# Patient Record
Sex: FEMALE | Race: WHITE | NOT HISPANIC OR LATINO | Employment: FULL TIME | URBAN - METROPOLITAN AREA
[De-identification: names, ages, dates, MRNs, and addresses within clinical notes are randomized per-mention and may not be internally consistent; named-entity substitution may affect disease eponyms.]

---

## 2023-08-21 ENCOUNTER — HOSPITAL ENCOUNTER (OUTPATIENT)
Dept: DATA CONVERSION | Facility: HOSPITAL | Age: 78
End: 2023-08-21

## 2023-08-21 DIAGNOSIS — M79.89 OTHER SPECIFIED SOFT TISSUE DISORDERS: ICD-10-CM

## 2023-08-21 PROBLEM — R39.9 LOWER URINARY TRACT SYMPTOMS (LUTS): Status: ACTIVE | Noted: 2023-08-21

## 2023-08-21 PROBLEM — K21.9 GASTROESOPHAGEAL REFLUX DISEASE WITHOUT ESOPHAGITIS: Status: ACTIVE | Noted: 2023-08-21

## 2023-08-21 PROBLEM — E55.9 VITAMIN D DEFICIENCY: Status: ACTIVE | Noted: 2023-08-21

## 2023-08-21 PROBLEM — I85.00 ESOPHAGEAL VARICES (MULTI): Status: ACTIVE | Noted: 2023-08-21

## 2023-08-21 PROBLEM — E66.9 OBESITY: Status: ACTIVE | Noted: 2023-08-21

## 2023-08-21 PROBLEM — R73.03 PREDIABETES: Status: ACTIVE | Noted: 2023-08-21

## 2023-08-21 PROBLEM — S82.851A CLOSED TRIMALLEOLAR FRACTURE OF RIGHT ANKLE: Status: ACTIVE | Noted: 2023-08-21

## 2023-08-21 PROBLEM — G47.00 INSOMNIA: Status: ACTIVE | Noted: 2023-08-21

## 2023-08-21 PROBLEM — M15.0 PRIMARY OSTEOARTHRITIS INVOLVING MULTIPLE JOINTS: Status: ACTIVE | Noted: 2023-08-21

## 2023-08-21 PROBLEM — E04.1 THYROID NODULE: Status: ACTIVE | Noted: 2023-08-21

## 2023-08-21 PROBLEM — R92.8 ABNORMAL FINDING ON MAMMOGRAPHY: Status: ACTIVE | Noted: 2023-08-21

## 2023-08-21 PROBLEM — F41.9 ANXIETY: Status: ACTIVE | Noted: 2023-08-21

## 2023-08-21 PROBLEM — R30.0 DYSURIA: Status: ACTIVE | Noted: 2023-08-21

## 2023-08-21 PROBLEM — J31.0 CHRONIC RHINITIS: Status: ACTIVE | Noted: 2023-08-21

## 2023-08-21 PROBLEM — Z98.890 STATUS POST EXCISIONAL BIOPSY: Status: ACTIVE | Noted: 2023-08-21

## 2023-08-21 PROBLEM — I10 ESSENTIAL HYPERTENSION: Status: ACTIVE | Noted: 2023-08-21

## 2023-08-21 PROBLEM — K76.0 FATTY LIVER: Status: ACTIVE | Noted: 2023-08-21

## 2023-08-21 PROBLEM — M15.9 PRIMARY OSTEOARTHRITIS INVOLVING MULTIPLE JOINTS: Status: ACTIVE | Noted: 2023-08-21

## 2023-08-21 PROBLEM — E78.2 MIXED HYPERLIPIDEMIA: Status: ACTIVE | Noted: 2023-08-21

## 2023-08-21 PROBLEM — S82.853A: Status: ACTIVE | Noted: 2023-08-21

## 2023-08-21 PROBLEM — R82.998 URINE LEUKOCYTES: Status: ACTIVE | Noted: 2023-08-21

## 2023-08-21 PROBLEM — B02.29 POSTHERPETIC NEURALGIA: Status: ACTIVE | Noted: 2023-08-21

## 2023-08-21 PROBLEM — N95.1 MENOPAUSAL SYMPTOM: Status: ACTIVE | Noted: 2023-08-21

## 2023-08-21 PROBLEM — S93.431A SYNDESMOTIC DISRUPTION OF RIGHT ANKLE: Status: ACTIVE | Noted: 2023-08-21

## 2023-08-21 PROBLEM — R60.0 EDEMA OF LOWER EXTREMITY: Status: ACTIVE | Noted: 2023-08-21

## 2023-08-21 PROBLEM — R31.9 HEMATURIA: Status: ACTIVE | Noted: 2023-08-21

## 2023-08-21 PROBLEM — N60.99 ATYPICAL LOBULAR HYPERPLASIA OF BREAST: Status: ACTIVE | Noted: 2023-08-21

## 2023-08-21 PROBLEM — K75.81 STEATOHEPATITIS: Status: ACTIVE | Noted: 2023-08-21

## 2023-08-21 PROBLEM — B35.1 TOENAIL FUNGUS: Status: ACTIVE | Noted: 2023-08-21

## 2023-08-21 PROBLEM — H69.93 DYSFUNCTION OF BOTH EUSTACHIAN TUBES: Status: ACTIVE | Noted: 2023-08-21

## 2023-08-21 PROBLEM — J01.90 ACUTE SUPPURATIVE SINUSITIS: Status: ACTIVE | Noted: 2023-08-21

## 2023-08-21 RX ORDER — ZOLPIDEM TARTRATE 10 MG/1
1 TABLET ORAL NIGHTLY PRN
COMMUNITY
Start: 2013-02-11 | End: 2023-10-02 | Stop reason: SDUPTHER

## 2023-08-21 RX ORDER — POTASSIUM CHLORIDE 20 MEQ/1
1 TABLET, EXTENDED RELEASE ORAL DAILY
COMMUNITY
Start: 2013-01-15 | End: 2024-04-05 | Stop reason: SDUPTHER

## 2023-08-21 RX ORDER — TRIAMCINOLONE ACETONIDE 1 MG/G
CREAM TOPICAL
COMMUNITY
Start: 2021-06-09

## 2023-08-21 RX ORDER — ALBUTEROL SULFATE 90 UG/1
2 AEROSOL, METERED RESPIRATORY (INHALATION) 4 TIMES DAILY PRN
COMMUNITY

## 2023-08-21 RX ORDER — MULTIVITAMIN
TABLET ORAL
COMMUNITY

## 2023-08-21 RX ORDER — GABAPENTIN 100 MG/1
1 CAPSULE ORAL 3 TIMES DAILY
COMMUNITY
Start: 2016-10-19 | End: 2023-10-06 | Stop reason: SDUPTHER

## 2023-08-21 RX ORDER — ALBUTEROL SULFATE 0.63 MG/3ML
SOLUTION RESPIRATORY (INHALATION)
COMMUNITY
Start: 2021-05-07

## 2023-08-21 RX ORDER — ALPRAZOLAM 0.5 MG/1
1 TABLET ORAL 2 TIMES DAILY PRN
COMMUNITY
Start: 2022-02-25 | End: 2023-10-06 | Stop reason: SDUPTHER

## 2023-08-21 RX ORDER — LOSARTAN POTASSIUM 100 MG/1
TABLET ORAL
COMMUNITY
Start: 2021-09-28 | End: 2023-10-06 | Stop reason: SDUPTHER

## 2023-08-21 RX ORDER — ERGOCALCIFEROL 1.25 MG/1
1 CAPSULE ORAL
COMMUNITY
Start: 2013-02-03 | End: 2023-10-06 | Stop reason: SDUPTHER

## 2023-08-21 RX ORDER — DOXYCYCLINE 100 MG/1
1 TABLET ORAL AS NEEDED
COMMUNITY
Start: 2023-05-15

## 2023-08-21 RX ORDER — HYDROCHLOROTHIAZIDE 25 MG/1
1 TABLET ORAL DAILY
COMMUNITY
Start: 2013-01-13 | End: 2023-10-06 | Stop reason: SDUPTHER

## 2023-08-21 RX ORDER — MELOXICAM 15 MG/1
1 TABLET ORAL DAILY
COMMUNITY
Start: 2022-06-24 | End: 2023-10-06 | Stop reason: SDUPTHER

## 2023-08-21 RX ORDER — LIDOCAINE HYDROCHLORIDE 20 MG/ML
SOLUTION OROPHARYNGEAL
COMMUNITY
Start: 2021-04-23

## 2023-08-21 RX ORDER — IPRATROPIUM BROMIDE AND ALBUTEROL SULFATE 2.5; .5 MG/3ML; MG/3ML
3 SOLUTION RESPIRATORY (INHALATION) EVERY 6 HOURS PRN
COMMUNITY
End: 2023-10-06 | Stop reason: SDUPTHER

## 2023-08-21 RX ORDER — ALPRAZOLAM 2 MG/1
TABLET ORAL AS NEEDED
COMMUNITY
End: 2023-10-06

## 2023-10-02 DIAGNOSIS — F41.9 ANXIETY: ICD-10-CM

## 2023-10-02 DIAGNOSIS — E78.2 MIXED HYPERLIPIDEMIA: ICD-10-CM

## 2023-10-02 DIAGNOSIS — Z01.89 ENCOUNTER FOR ROUTINE LABORATORY TESTING: Primary | ICD-10-CM

## 2023-10-02 DIAGNOSIS — R73.9 HYPERGLYCEMIA: ICD-10-CM

## 2023-10-02 DIAGNOSIS — I10 ESSENTIAL HYPERTENSION: ICD-10-CM

## 2023-10-02 DIAGNOSIS — E55.9 VITAMIN D DEFICIENCY: ICD-10-CM

## 2023-10-02 RX ORDER — ZOLPIDEM TARTRATE 10 MG/1
10 TABLET ORAL NIGHTLY PRN
Qty: 30 TABLET | Refills: 0 | Status: SHIPPED | OUTPATIENT
Start: 2023-10-02 | End: 2023-10-06 | Stop reason: SDUPTHER

## 2023-10-02 NOTE — TELEPHONE ENCOUNTER
-Pt requesting Rx for zolpidem (Ambien) 10 mg tablet to [facility] on [address] in [place].  -Pt also requesting order for labs before appt on Friday.

## 2023-10-03 ENCOUNTER — LAB (OUTPATIENT)
Dept: LAB | Facility: LAB | Age: 78
End: 2023-10-03
Payer: COMMERCIAL

## 2023-10-03 DIAGNOSIS — E78.2 MIXED HYPERLIPIDEMIA: ICD-10-CM

## 2023-10-03 DIAGNOSIS — Z01.89 ENCOUNTER FOR ROUTINE LABORATORY TESTING: ICD-10-CM

## 2023-10-03 DIAGNOSIS — E55.9 VITAMIN D DEFICIENCY: ICD-10-CM

## 2023-10-03 DIAGNOSIS — I10 ESSENTIAL HYPERTENSION: ICD-10-CM

## 2023-10-03 DIAGNOSIS — R73.9 HYPERGLYCEMIA: ICD-10-CM

## 2023-10-03 PROCEDURE — 36415 COLL VENOUS BLD VENIPUNCTURE: CPT

## 2023-10-04 DIAGNOSIS — R73.03 PREDIABETES: Primary | ICD-10-CM

## 2023-10-04 LAB
25(OH)D3 SERPL-MCNC: 61 NG/ML (ref 30–100)
ALBUMIN SERPL BCP-MCNC: 4.3 G/DL (ref 3.4–5)
ALP SERPL-CCNC: 74 U/L (ref 33–136)
ALT SERPL W P-5'-P-CCNC: 25 U/L (ref 7–45)
ANION GAP SERPL CALC-SCNC: 15 MMOL/L (ref 10–20)
AST SERPL W P-5'-P-CCNC: 16 U/L (ref 9–39)
BASOPHILS # BLD AUTO: 0.08 X10*3/UL (ref 0–0.1)
BASOPHILS NFR BLD AUTO: 0.6 %
BILIRUB DIRECT SERPL-MCNC: 0.1 MG/DL (ref 0–0.3)
BILIRUB SERPL-MCNC: 0.7 MG/DL (ref 0–1.2)
BUN SERPL-MCNC: 13 MG/DL (ref 6–23)
CALCIUM SERPL-MCNC: 9.4 MG/DL (ref 8.6–10.6)
CHLORIDE SERPL-SCNC: 101 MMOL/L (ref 98–107)
CHOLEST SERPL-MCNC: 187 MG/DL (ref 0–199)
CHOLESTEROL/HDL RATIO: 4.5
CO2 SERPL-SCNC: 29 MMOL/L (ref 21–32)
CREAT SERPL-MCNC: 0.59 MG/DL (ref 0.5–1.05)
EOSINOPHIL # BLD AUTO: 0.27 X10*3/UL (ref 0–0.4)
EOSINOPHIL NFR BLD AUTO: 2.1 %
ERYTHROCYTE [DISTWIDTH] IN BLOOD BY AUTOMATED COUNT: 13.9 % (ref 11.5–14.5)
EST. AVERAGE GLUCOSE BLD GHB EST-MCNC: 134 MG/DL
GFR SERPL CREATININE-BSD FRML MDRD: >90 ML/MIN/1.73M*2
GLUCOSE SERPL-MCNC: 102 MG/DL (ref 74–99)
HBA1C MFR BLD: 6.3 %
HCT VFR BLD AUTO: 50.1 % (ref 36–46)
HDLC SERPL-MCNC: 41.9 MG/DL
HGB BLD-MCNC: 16.6 G/DL (ref 12–16)
IMM GRANULOCYTES # BLD AUTO: 0.08 X10*3/UL (ref 0–0.5)
IMM GRANULOCYTES NFR BLD AUTO: 0.6 % (ref 0–0.9)
LDLC SERPL CALC-MCNC: 105 MG/DL (ref 140–190)
LYMPHOCYTES # BLD AUTO: 4 X10*3/UL (ref 0.8–3)
LYMPHOCYTES NFR BLD AUTO: 31.4 %
MCH RBC QN AUTO: 30.5 PG (ref 26–34)
MCHC RBC AUTO-ENTMCNC: 33.1 G/DL (ref 32–36)
MCV RBC AUTO: 92 FL (ref 80–100)
MONOCYTES # BLD AUTO: 0.8 X10*3/UL (ref 0.05–0.8)
MONOCYTES NFR BLD AUTO: 6.3 %
NEUTROPHILS # BLD AUTO: 7.52 X10*3/UL (ref 1.6–5.5)
NEUTROPHILS NFR BLD AUTO: 59 %
NON HDL CHOLESTEROL: 145 MG/DL (ref 0–149)
NRBC BLD-RTO: 0 /100 WBCS (ref 0–0)
PLATELET # BLD AUTO: 255 X10*3/UL (ref 150–450)
PMV BLD AUTO: 11.3 FL (ref 7.5–11.5)
POTASSIUM SERPL-SCNC: 3.7 MMOL/L (ref 3.5–5.3)
PROT SERPL-MCNC: 7.2 G/DL (ref 6.4–8.2)
RBC # BLD AUTO: 5.45 X10*6/UL (ref 4–5.2)
SODIUM SERPL-SCNC: 141 MMOL/L (ref 136–145)
TRIGL SERPL-MCNC: 201 MG/DL (ref 0–149)
VLDL: 40 MG/DL (ref 0–40)
WBC # BLD AUTO: 12.8 X10*3/UL (ref 4.4–11.3)

## 2023-10-04 RX ORDER — METFORMIN HYDROCHLORIDE 500 MG/1
500 TABLET, EXTENDED RELEASE ORAL
Qty: 90 TABLET | Refills: 3 | Status: SHIPPED | OUTPATIENT
Start: 2023-10-04 | End: 2023-10-06

## 2023-10-06 ENCOUNTER — OFFICE VISIT (OUTPATIENT)
Dept: PRIMARY CARE | Facility: CLINIC | Age: 78
End: 2023-10-06
Payer: COMMERCIAL

## 2023-10-06 VITALS
TEMPERATURE: 97.6 F | HEART RATE: 83 BPM | WEIGHT: 202 LBS | OXYGEN SATURATION: 94 % | BODY MASS INDEX: 38.29 KG/M2 | DIASTOLIC BLOOD PRESSURE: 80 MMHG | SYSTOLIC BLOOD PRESSURE: 138 MMHG

## 2023-10-06 DIAGNOSIS — Z23 ENCOUNTER FOR IMMUNIZATION: ICD-10-CM

## 2023-10-06 DIAGNOSIS — E78.2 MIXED HYPERLIPIDEMIA: ICD-10-CM

## 2023-10-06 DIAGNOSIS — Z71.89 COUNSELING REGARDING ADVANCED CARE PLANNING AND GOALS OF CARE: ICD-10-CM

## 2023-10-06 DIAGNOSIS — E55.9 VITAMIN D DEFICIENCY: ICD-10-CM

## 2023-10-06 DIAGNOSIS — M15.9 PRIMARY OSTEOARTHRITIS INVOLVING MULTIPLE JOINTS: ICD-10-CM

## 2023-10-06 DIAGNOSIS — Z12.12 ENCOUNTER FOR COLORECTAL CANCER SCREENING: ICD-10-CM

## 2023-10-06 DIAGNOSIS — R06.00 DYSPNEA, UNSPECIFIED TYPE: ICD-10-CM

## 2023-10-06 DIAGNOSIS — Z00.00 ANNUAL PHYSICAL EXAM: Primary | ICD-10-CM

## 2023-10-06 DIAGNOSIS — E66.01 CLASS 2 SEVERE OBESITY WITH SERIOUS COMORBIDITY AND BODY MASS INDEX (BMI) OF 38.0 TO 38.9 IN ADULT, UNSPECIFIED OBESITY TYPE (MULTI): ICD-10-CM

## 2023-10-06 DIAGNOSIS — Z12.11 ENCOUNTER FOR COLORECTAL CANCER SCREENING: ICD-10-CM

## 2023-10-06 DIAGNOSIS — R73.03 PREDIABETES: ICD-10-CM

## 2023-10-06 DIAGNOSIS — Z01.89 ENCOUNTER FOR ROUTINE LABORATORY TESTING: ICD-10-CM

## 2023-10-06 DIAGNOSIS — Z12.31 ENCOUNTER FOR SCREENING MAMMOGRAM FOR BREAST CANCER: ICD-10-CM

## 2023-10-06 DIAGNOSIS — K21.9 GASTROESOPHAGEAL REFLUX DISEASE WITHOUT ESOPHAGITIS: ICD-10-CM

## 2023-10-06 DIAGNOSIS — Z13.820 ENCOUNTER FOR SCREENING FOR OSTEOPOROSIS: ICD-10-CM

## 2023-10-06 DIAGNOSIS — F41.9 ANXIETY: ICD-10-CM

## 2023-10-06 DIAGNOSIS — I10 ESSENTIAL HYPERTENSION: ICD-10-CM

## 2023-10-06 DIAGNOSIS — M15.9 POLYOSTEOARTHRITIS, UNSPECIFIED: ICD-10-CM

## 2023-10-06 PROCEDURE — 1126F AMNT PAIN NOTED NONE PRSNT: CPT | Performed by: STUDENT IN AN ORGANIZED HEALTH CARE EDUCATION/TRAINING PROGRAM

## 2023-10-06 PROCEDURE — 3079F DIAST BP 80-89 MM HG: CPT | Performed by: STUDENT IN AN ORGANIZED HEALTH CARE EDUCATION/TRAINING PROGRAM

## 2023-10-06 PROCEDURE — 1160F RVW MEDS BY RX/DR IN RCRD: CPT | Performed by: STUDENT IN AN ORGANIZED HEALTH CARE EDUCATION/TRAINING PROGRAM

## 2023-10-06 PROCEDURE — G0439 PPPS, SUBSEQ VISIT: HCPCS | Performed by: STUDENT IN AN ORGANIZED HEALTH CARE EDUCATION/TRAINING PROGRAM

## 2023-10-06 PROCEDURE — 1036F TOBACCO NON-USER: CPT | Performed by: STUDENT IN AN ORGANIZED HEALTH CARE EDUCATION/TRAINING PROGRAM

## 2023-10-06 PROCEDURE — 3075F SYST BP GE 130 - 139MM HG: CPT | Performed by: STUDENT IN AN ORGANIZED HEALTH CARE EDUCATION/TRAINING PROGRAM

## 2023-10-06 PROCEDURE — 1159F MED LIST DOCD IN RCRD: CPT | Performed by: STUDENT IN AN ORGANIZED HEALTH CARE EDUCATION/TRAINING PROGRAM

## 2023-10-06 RX ORDER — MELOXICAM 15 MG/1
15 TABLET ORAL DAILY
Qty: 90 TABLET | OUTPATIENT
Start: 2023-10-06

## 2023-10-06 RX ORDER — IPRATROPIUM BROMIDE AND ALBUTEROL SULFATE 2.5; .5 MG/3ML; MG/3ML
3 SOLUTION RESPIRATORY (INHALATION) EVERY 6 HOURS PRN
Qty: 180 ML
Start: 2023-10-06

## 2023-10-06 RX ORDER — GABAPENTIN 100 MG/1
100 CAPSULE ORAL 3 TIMES DAILY
Start: 2023-10-06 | End: 2023-10-11

## 2023-10-06 RX ORDER — TIZANIDINE 4 MG/1
4 TABLET ORAL EVERY 8 HOURS PRN
Qty: 60 TABLET | Refills: 1 | Status: SHIPPED | OUTPATIENT
Start: 2023-10-06

## 2023-10-06 RX ORDER — ALPRAZOLAM 0.5 MG/1
0.5 TABLET ORAL 2 TIMES DAILY PRN
Qty: 7 TABLET
Start: 2023-10-06 | End: 2024-04-05 | Stop reason: ALTCHOICE

## 2023-10-06 RX ORDER — MELOXICAM 15 MG/1
15 TABLET ORAL DAILY
Start: 2023-10-06

## 2023-10-06 RX ORDER — ZOLPIDEM TARTRATE 10 MG/1
10 TABLET ORAL NIGHTLY PRN
Qty: 30 TABLET | Refills: 0
Start: 2023-10-06 | End: 2023-10-11

## 2023-10-06 RX ORDER — ERGOCALCIFEROL 1.25 MG/1
1 CAPSULE ORAL
Start: 2023-10-06 | End: 2023-10-18

## 2023-10-06 RX ORDER — LOSARTAN POTASSIUM 100 MG/1
100 TABLET ORAL DAILY
Start: 2023-10-06 | End: 2024-02-20 | Stop reason: ALTCHOICE

## 2023-10-06 RX ORDER — HYDROCHLOROTHIAZIDE 25 MG/1
25 TABLET ORAL DAILY
Start: 2023-10-06 | End: 2024-02-08

## 2023-10-06 ASSESSMENT — ENCOUNTER SYMPTOMS
ALLERGIC/IMMUNOLOGIC NEGATIVE: 1
MUSCULOSKELETAL NEGATIVE: 1
GASTROINTESTINAL NEGATIVE: 1
HEMATOLOGIC/LYMPHATIC NEGATIVE: 1
NEUROLOGICAL NEGATIVE: 1
ENDOCRINE NEGATIVE: 1
PSYCHIATRIC NEGATIVE: 1
RESPIRATORY NEGATIVE: 1
EYES NEGATIVE: 1
CARDIOVASCULAR NEGATIVE: 1
CONSTITUTIONAL NEGATIVE: 1

## 2023-10-06 ASSESSMENT — PAIN SCALES - GENERAL: PAINLEVEL: 0-NO PAIN

## 2023-10-06 NOTE — PATIENT INSTRUCTIONS
Diagnoses and all orders for this visit:  Annual physical exam  Encounter for routine laboratory testing  Comments:  Patient had labwork done for this appointment.  Will order labwork for the patient's next appointment.  Encounter for colorectal cancer screening  Encounter for screening mammogram for breast cancer  Encounter for screening for osteoporosis  Encounter for immunization  Counseling regarding advanced care planning and goals of care  Comments:  - Encouraged the patient to confirm that all documents are up to date.  Essential hypertension  Comments:  - Looks great, will not make changes at this time.  Orders:  -     hydroCHLOROthiazide (HYDRODiuril) 25 mg tablet; Take 1 tablet (25 mg) by mouth once daily.  -     losartan (Cozaar) 100 mg tablet; Take 1 tablet (100 mg) by mouth once daily.  Prediabetes  Comments:  - Discussed starting metformin in addition to dietary and lifestyle modification. Patient not interested at this time.  Mixed hyperlipidemia  Comments:  - Encouraged weight loss to assist with hypertriglyceridemia.  - Will not make medication changes at this time.  Class 2 severe obesity with serious comorbidity and body mass index (BMI) of 38.0 to 38.9 in adult, unspecified obesity type (CMS/[facility])  Comments:  - Encouraged the patient to see if GLP1s (wegovy, mounjaro, ozempic, trulicity) are covered by insurance.  - Would happily prescribe if they are.  Gastroesophageal reflux disease without esophagitis  Primary osteoarthritis involving multiple joints  -     gabapentin (Neurontin) 100 mg capsule; Take 1 capsule (100 mg) by mouth 3 times a day.  -     meloxicam (Mobic) 15 mg tablet; Take 1 tablet (15 mg) by mouth once daily.  -     tiZANidine (Zanaflex) 4 mg tablet; Take 1 tablet (4 mg) by mouth every 8 hours if needed for muscle spasms.  Dyspnea, unspecified type  -     ipratropium-albuteroL (Duo-Neb) 0.5-2.5 mg/3 mL nebulizer solution; Take 3 mL by nebulization every 6 hours if needed for  wheezing or shortness of breath.  Vitamin D deficiency  -     ergocalciferol (Vitamin D-2) 1.25 MG (76202 UT) capsule; Take 1 capsule (1,250 mcg) by mouth 1 (one) time per week.  Anxiety  -     ALPRAZolam (Xanax) 0.5 mg tablet; Take 1 tablet (0.5 mg) by mouth 2 times a day as needed for anxiety.  -     zolpidem (Ambien) 10 mg tablet; Take 1 tablet (10 mg) by mouth as needed at bedtime for sleep.

## 2023-10-06 NOTE — PROGRESS NOTES
[facility]: [facility]  MEDICARE WELLNESS EXAM      Jaclyn Mills is a 78 y.o. female that is presenting today for Annual Exam (CPE).    Assessment/Plan    Diagnoses and all orders for this visit:  Annual physical exam  Encounter for routine laboratory testing  Encounter for colorectal cancer screening  Encounter for screening mammogram for breast cancer  Encounter for screening for osteoporosis  Encounter for immunization  Counseling regarding advanced care planning and goals of care  Comments:  - Encouraged the patient to confirm that all documents are up to date.  Essential hypertension  Comments:  - Looks great, will not make changes at this time.  Prediabetes  Mixed hyperlipidemia  Class 2 severe obesity with serious comorbidity and body mass index (BMI) of 38.0 to 38.9 in adult, unspecified obesity type (CMS/[facility])  Comments:  - Encouraged the patient to see if GLP1s (wegovy, mounjaro, ozempic, trulicity) are covered by insurance.  - Would happily prescribe if they are.  Gastroesophageal reflux disease without esophagitis  Primary osteoarthritis involving multiple joints  Vitamin D deficiency  Anxiety    Advance Care Planning   Advanced Care Planning was discussed with patient:  The patient has an active advanced care plan on file The patient has an active surrogate decision-maker on file  The patient was encouraged to ensure that any/all documentation is accurate and up to date, and that our office be provided a copy in the event that anything changes.    ACTIVITIES OF DAILY LIVING:  Basic ADLs:  Bathing: Independent, Dressing: Independent, Toileting: Independent, Transferring: Independent, Continence: Independent, Feeding: Independent.    Instrumental ADLs:  Ability to use phone: Independent, Shopping: Independent, Cooking: Independent, House-keeping: Independent, Laundry: Independent, Transportation: Independent, Medication Management: Independent, Finance Management:  Independent.    Subjective   - The patient feels well and denies any acute symptoms or concerns at this time.   - The patient denies any changes or progression of their chronic medical problems.  - The patient denies any problems or concerns with their medications.      Review of Systems   Constitutional: Negative.    HENT: Negative.     Eyes: Negative.    Respiratory: Negative.     Cardiovascular: Negative.    Gastrointestinal: Negative.    Endocrine: Negative.    Genitourinary: Negative.    Musculoskeletal: Negative.    Skin: Negative.    Allergic/Immunologic: Negative.    Neurological: Negative.    Hematological: Negative.    Psychiatric/Behavioral: Negative.     All other systems reviewed and are negative.    Objective   Vitals:    10/06/23 1505   BP: 138/80   Pulse: 83   Temp: 36.4 °C (97.6 °F)   SpO2: 94%      Body mass index is 38.29 kg/m².  Physical Exam  Vitals and nursing note reviewed.   Constitutional:       General: She is not in acute distress.     Appearance: Normal appearance. She is not ill-appearing.   HENT:      Head: Normocephalic and atraumatic.      Right Ear: Tympanic membrane, ear canal and external ear normal. There is no impacted cerumen.      Left Ear: Tympanic membrane, ear canal and external ear normal. There is no impacted cerumen.      Nose: Nose normal.      Mouth/Throat:      Mouth: Mucous membranes are moist.      Pharynx: Oropharynx is clear. No oropharyngeal exudate or posterior oropharyngeal erythema.   Eyes:      General: No scleral icterus.        Right eye: No discharge.         Left eye: No discharge.      Extraocular Movements: Extraocular movements intact.      Conjunctiva/sclera: Conjunctivae normal.      Pupils: Pupils are equal, round, and reactive to light.   Neck:      Vascular: No carotid bruit.   Cardiovascular:      Rate and Rhythm: Normal rate and regular rhythm.      Pulses: Normal pulses.      Heart sounds: Normal heart sounds. No murmur heard.     No friction  "rub. No gallop.   Pulmonary:      Effort: Pulmonary effort is normal. No respiratory distress.      Breath sounds: Normal breath sounds.   Abdominal:      General: Abdomen is flat. Bowel sounds are normal. There is no distension.      Palpations: Abdomen is soft.      Tenderness: There is no abdominal tenderness.      Hernia: No hernia is present.   Musculoskeletal:         General: No swelling or tenderness. Normal range of motion.   Lymphadenopathy:      Cervical: No cervical adenopathy.   Skin:     General: Skin is warm and dry.      Capillary Refill: Capillary refill takes less than 2 seconds.      Coloration: Skin is not jaundiced.      Findings: No rash.   Neurological:      General: No focal deficit present.      Mental Status: She is alert and oriented to person, place, and time. Mental status is at baseline.   Psychiatric:         Mood and Affect: Mood normal.         Behavior: Behavior normal.       Diagnostic Results   Lab Results   Component Value Date    GLUCOSE 102 (H) 10/03/2023    CALCIUM 9.4 10/03/2023     10/03/2023    K 3.7 10/03/2023    CO2 29 10/03/2023     10/03/2023    BUN 13 10/03/2023    CREATININE 0.59 10/03/2023     Lab Results   Component Value Date    ALT 25 10/03/2023    AST 16 10/03/2023    ALKPHOS 74 10/03/2023    BILITOT 0.7 10/03/2023     Lab Results   Component Value Date    WBC 12.8 (H) 10/03/2023    HGB 16.6 (H) 10/03/2023    HCT 50.1 (H) 10/03/2023    MCV 92 10/03/2023     10/03/2023     Lab Results   Component Value Date    CHOL 187 10/03/2023    CHOL 187 07/26/2022    CHOL 178 01/06/2022     Lab Results   Component Value Date    HDL 41.9 10/03/2023    HDL 40.7 07/26/2022    HDL 40.0 01/06/2022     Lab Results   Component Value Date    LDLCALC 105 (L) 10/03/2023     Lab Results   Component Value Date    TRIG 201 (H) 10/03/2023    TRIG 192 (H) 07/26/2022    TRIG 126 01/06/2022     No components found for: \"CHOLHDL\"  Lab Results   Component Value Date    HGBA1C " 6.3 (H) 10/03/2023     Other labs not included in the list above reviewed either before or during this encounter.    History   Past Medical History:   Diagnosis Date    Other abnormal and inconclusive findings on diagnostic imaging of breast 2020    Abnormal mammogram of left breast    Other specified disorders of breast 2020    Seroma of breast    Personal history of other diseases of the circulatory system 2020    History of hypertension    Personal history of other diseases of the respiratory system 2019    History of acute bacterial sinusitis    Squamous cell carcinoma of skin of right lower limb, including hip 2020    Squamous cell carcinoma of skin of right lower extremity     Past Surgical History:   Procedure Laterality Date    HERNIA REPAIR  2020    Hernia Repair    OTHER SURGICAL HISTORY  10/19/2020    Biopsy Skin    ROTATOR CUFF REPAIR  2020    Rotator Cuff Repair    TOTAL ABDOMINAL HYSTERECTOMY  2020    Total Abdominal Hysterectomy    UMBILICAL HERNIA REPAIR  2020    Umbilical Hernia Repair     Family History   Problem Relation Name Age of Onset    Stroke Mother      Diabetes Mother      Alzheimer's disease Father      Lung cancer Sister      No Known Problems Sister      Asthma Brother      Cancer Brother      No Known Problems Brother      Breast cancer Daughter      Cancer Daughter      No Known Problems Daughter       Social History     Socioeconomic History    Marital status:      Spouse name: Not on file    Number of children: Not on file    Years of education: Not on file    Highest education level: Not on file   Occupational History    Not on file   Tobacco Use    Smoking status: Former     Types: Cigarettes     Quit date:      Years since quittin.7    Smokeless tobacco: Never   Substance and Sexual Activity    Alcohol use: Yes     Comment: rarely    Drug use: Never    Sexual activity: Not on file   Other Topics Concern    Not  on file   Social History Narrative    Not on file     Social Determinants of Health     Financial Resource Strain: Not on file   Food Insecurity: Not on file   Transportation Needs: Not on file   Physical Activity: Not on file   Stress: Not on file   Social Connections: Not on file   Intimate Partner Violence: Not on file   Housing Stability: Not on file     Allergies   Allergen Reactions    Amoxicillin-Pot Clavulanate Itching     vomiting    Sulfa (Sulfonamide Antibiotics) Unknown and Other    Hydrocodone-Acetaminophen Rash and Itching     Current Outpatient Medications on File Prior to Visit   Medication Sig Dispense Refill    albuterol (Proventil HFA) 90 mcg/actuation inhaler Inhale 2 puffs 4 times a day as needed.      albuterol 0.63 mg/3 mL nebulizer solution Take by nebulization.      ALPRAZolam (Xanax) 0.5 mg tablet Take 1 tablet (0.5 mg) by mouth 2 times a day as needed.      calcium carb,gluc/mag ox,gluc (CALCIUM MAGNESIUM ORAL) 1 tablet once daily.      doxycycline (Adoxa) 100 mg tablet Take 1 tablet (100 mg) by mouth 2 times a day.      ergocalciferol (Vitamin D-2) 1.25 MG (23344 UT) capsule Take 1 capsule (1,250 mcg) by mouth 1 (one) time per week.      gabapentin (Neurontin) 100 mg capsule Take 1 capsule (100 mg) by mouth 3 times a day.      hydroCHLOROthiazide (HYDRODiuril) 25 mg tablet Take 1 tablet (25 mg) by mouth once daily.      ipratropium-albuteroL (Duo-Neb) 0.5-2.5 mg/3 mL nebulizer solution Take 3 mL by nebulization every 6 hours if needed.      lidocaine (Xylocaine) 2 % solution Take by mouth.      losartan (Cozaar) 100 mg tablet Take by mouth.      meloxicam (Mobic) 15 mg tablet Take 1 tablet (15 mg) by mouth once daily.      metFORMIN XR (Glucophage-XR) 500 mg 24 hr tablet Take 1 tablet (500 mg) by mouth once daily with a meal. Do not crush, chew, or split. 90 tablet 3    multivitamin tablet Take by mouth.      potassium chloride CR (Klor-Con M20) 20 mEq ER tablet Take 1 tablet (20 mEq) by  mouth once daily. With food      triamcinolone (Kenalog) 0.1 % cream Triamcinolone Acetonide 0.1 % External Cream   Quantity: 30  Refills: 0        Start : 9-Jun-2021   Active      zolpidem (Ambien) 10 mg tablet Take 1 tablet (10 mg) by mouth as needed at bedtime for sleep. 30 tablet 0    [DISCONTINUED] ALPRAZolam (Xanax) 2 mg tablet Take by mouth if needed.      [DISCONTINUED] zolpidem (Ambien) 10 mg tablet Take 1 tablet (10 mg) by mouth as needed at bedtime for sleep.       No current facility-administered medications on file prior to visit.     Immunization History   Administered Date(s) Administered    Influenza, High Dose Seasonal, Preservative Free 10/29/2016, 09/08/2018, 08/23/2019    Influenza, Seasonal, Quadrivalent, Adjuvanted 10/16/2020, 10/21/2021, 10/15/2022    Influenza, seasonal, injectable 11/30/2010, 10/01/2015    Pfizer COVID-19 vaccine, bivalent, age 12 years and older (30 mcg/0.3 mL) 10/15/2022    Pfizer Gray Cap SARS-CoV-2 04/30/2022    Pfizer Purple Cap SARS-CoV-2 02/27/2021, 03/27/2021, 10/21/2021    Pneumococcal conjugate vaccine, 13-valent (PREVNAR 13) 10/27/2015, 11/01/2017, 12/01/2017    Pneumococcal polysaccharide vaccine, 23-valent, age 2 years and older (PNEUMOVAX 23) 09/10/2018    Zoster vaccine, recombinant, adult (SHINGRIX) 03/26/2019, 08/24/2019    Zoster, live 10/06/2017     Patient's medical history was reviewed and updated either before or during this encounter.    Giblerto Corrales MD

## 2023-10-10 ENCOUNTER — EVALUATION (OUTPATIENT)
Dept: PHYSICAL THERAPY | Facility: CLINIC | Age: 78
End: 2023-10-10
Payer: COMMERCIAL

## 2023-10-10 DIAGNOSIS — R60.0 EDEMA OF LOWER EXTREMITY: Primary | ICD-10-CM

## 2023-10-10 DIAGNOSIS — F41.9 ANXIETY: ICD-10-CM

## 2023-10-10 DIAGNOSIS — M15.9 PRIMARY OSTEOARTHRITIS INVOLVING MULTIPLE JOINTS: ICD-10-CM

## 2023-10-10 PROCEDURE — 97162 PT EVAL MOD COMPLEX 30 MIN: CPT | Mod: GP

## 2023-10-10 PROCEDURE — 97110 THERAPEUTIC EXERCISES: CPT | Mod: GP

## 2023-10-10 ASSESSMENT — ENCOUNTER SYMPTOMS
LOSS OF SENSATION IN FEET: 1
OCCASIONAL FEELINGS OF UNSTEADINESS: 1
DEPRESSION: 1

## 2023-10-10 NOTE — LETTER
October 10, 2023     Patient: Jaclyn Mills   YOB: 1945   Date of Visit: 10/10/2023       To Whom It May Concern:    It is my medical opinion that Jaclyn Mills {Work release (duty restriction):03948}.    If you have any questions or concerns, please don't hesitate to call.         Sincerely,        Ashley Arambula, PT    CC: No Recipients

## 2023-10-10 NOTE — LETTER
October 10, 2023     Patient: Jaclyn Mills   YOB: 1945   Date of Visit: 10/10/2023       To Whom it May Concern:    Jaclyn Mills was seen in my clinic on 10/10/2023. She {Return to school/sport:88520}.    If you have any questions or concerns, please don't hesitate to call.         Sincerely,          Ashley Arambula, PT        CC: No Recipients

## 2023-10-10 NOTE — PROGRESS NOTES
"Physical Therapy    Physical Therapy Evaluation and Treatment      Patient Name: Jaclyn Mills  MRN: [id number]  Today's Date: 10/10/2023  Time Calculation  Start Time: 0900  Stop Time: 1000  Time Calculation (min): 60 min      Assessment:  PT Assessment Results:  (edema, difficulty walking)  Rehab Prognosis: Good  Evaluation/Treatment Tolerance: Patient tolerated treatment well  Strengths: Ability to acquire knowledge  Assessment Comment: Pt is a 79 y/o female with chronic right ankle edema following trimalleolar fracture with ORIF.  Edema impacts gait and safety when ambulating.  Pt would benefit from Skilled PT to address edema and return to PLOF.    Plan:  Treatment/Interventions: Education/ Instruction, Manual therapy, Self care/ home management, Therapeutic activities, Therapeutic exercises  PT Plan: Skilled PT  PT Frequency: 2 times per week  Duration: 4weeks  Certification Period Start Date: 10/10/23  Number of Treatments Authorized: pending  Rehab Potential: Good  Plan of Care Agreement: Patient    Current Problem:   1. Edema of lower extremity  Follow Up In Physical Therapy          Subjective      Pt reports right trimalleolar fracture on July 3, 2022. 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  Pt states that since surgery, she has had increased edema in right ankle. Pt states it is better in the am but by the end of the day \"my ankle is twice as big\". Pt denies pain but states ankle feels tight, \"like there is a vise around it\". Pt reports edema affects her gait and balance; often feels unsteady when walking.  Pt reports she has been elevating her leg in the evening for at least 8 months. Has tried compression stockings but too difficulty for her to get on.    General:  General  Reason for Referral: right ankle edema  Referred By: grayson  Past Medical History Relevant to Rehab: h/o right ankle fracture with  ORIF  Precautions:  Precautions  STEADI Fall Risk Score (The " score of 4 or more indicates an increased risk of falling): 7  Precautions Comment: lymphedema     Pain:   Pt denies pain    Prior Level of Function:  Pt works full time as an .  Sits a lot during the day. Pt states she does not exercise and is not very active     Objective      General Assessments:    Leg girth                   Right          Left    Foot      20.2cm     20.1cm    10cm     26.2cm     23.8cm    20cm     32.6cm     30.7cm    30cm     41.0cm     41.5cm    40cm     39.7cm     39.4cm  (-)stemmer sign  (+)pitting B lower legs near ankle  Skin integrity, coloring, temp WFL  No fibrosis noted    ROM:    Right ankle: df 5deg, pf 40deg, ever 5deg, inver 20deg     Left ankle: df 10deg, pf 50deg, ever 5deg, inver 25deg   Strength:  B ankle strength WFL     Outcome Measures:  Other Measures  Other Outcome Measures: LLIS 27/68     Treatments:  Therapeutic exercise:  pt instructed in and performed:  diaphragmatic breathing, toe taps, heel raises, ankle circles, toe curls, LAQ, iso hip add, iso hip abd, GS, MIP. Handouts provided      EDUCATION:  Pt educated in clinical findings and POC; lymphedema diagnosis, prognosis, intervention, risk-reduction strategies. List of bandaging supplies to purchase given to pt  Education  Individual(s) Educated: Patient  Education Provided: Home Exercise Program, Lymphedema care  Risk and Benefits Discussed with Patient/Caregiver/Other: yes  Patient/Caregiver Demonstrated Understanding: yes  Plan of Care Discussed and Agreed Upon: yes  Patient Response to Education: Patient/Caregiver Verbalized Understanding of Information    Goals:  Encounter Problems       Encounter Problems (Active)       PT Problem       PT Goal 1       Start:  10/10/23    Expected End:  11/24/23       Pt independent with lymphedema self-management strategies         PT Goal 2       Start:  10/10/23    Expected End:  11/24/23       Improve LLIS to no more than 20% impairment         PT Goal 3        Start:  10/10/23    Expected End:  11/24/23       Reduce edema right ankle/lower leg by at least 10%         PT Goal 4       Start:  10/10/23    Expected End:  11/24/23       Pt to report improved steadiness when ambulating, jeff on compliant surfaces

## 2023-10-11 ENCOUNTER — OFFICE VISIT (OUTPATIENT)
Dept: DERMATOLOGY | Facility: CLINIC | Age: 78
End: 2023-10-11
Payer: COMMERCIAL

## 2023-10-11 DIAGNOSIS — L65.0 TELOGEN EFFLUVIUM: ICD-10-CM

## 2023-10-11 DIAGNOSIS — D22.9 MULTIPLE BENIGN MELANOCYTIC NEVI: ICD-10-CM

## 2023-10-11 DIAGNOSIS — L82.1 SEBORRHEIC KERATOSES: ICD-10-CM

## 2023-10-11 DIAGNOSIS — L57.0 ACTINIC KERATOSIS: Primary | ICD-10-CM

## 2023-10-11 DIAGNOSIS — D18.01 CHERRY ANGIOMA: ICD-10-CM

## 2023-10-11 DIAGNOSIS — L08.9 INFLAMMATION, SKIN: ICD-10-CM

## 2023-10-11 DIAGNOSIS — L90.5 SCAR CONDITIONS AND FIBROSIS OF SKIN: ICD-10-CM

## 2023-10-11 DIAGNOSIS — L57.8 SUN-DAMAGED SKIN: ICD-10-CM

## 2023-10-11 DIAGNOSIS — L30.9 DERMATITIS: ICD-10-CM

## 2023-10-11 PROCEDURE — 99214 OFFICE O/P EST MOD 30 MIN: CPT | Performed by: DERMATOLOGY

## 2023-10-11 PROCEDURE — 17000 DESTRUCT PREMALG LESION: CPT | Performed by: DERMATOLOGY

## 2023-10-11 PROCEDURE — 17003 DESTRUCT PREMALG LES 2-14: CPT | Performed by: DERMATOLOGY

## 2023-10-11 RX ORDER — FLUOROURACIL 50 MG/G
CREAM TOPICAL 2 TIMES DAILY
Qty: 40 G | Refills: 0 | Status: SHIPPED | OUTPATIENT
Start: 2023-10-11 | End: 2023-11-10

## 2023-10-11 RX ORDER — GABAPENTIN 100 MG/1
100 CAPSULE ORAL 3 TIMES DAILY
Qty: 270 CAPSULE | Refills: 3 | Status: SHIPPED | OUTPATIENT
Start: 2023-10-11 | End: 2024-10-10

## 2023-10-11 RX ORDER — ZOLPIDEM TARTRATE 10 MG/1
TABLET ORAL
Qty: 90 TABLET | Refills: 1 | Status: SHIPPED | OUTPATIENT
Start: 2023-10-11 | End: 2024-01-09 | Stop reason: SDUPTHER

## 2023-10-11 NOTE — TELEPHONE ENCOUNTER
LV 10/6/23, NV 4/5/23.  Request for 90-day supply ambien 10 mg and gabapentin 100 mg to [facility] on [facility].  Last rf: on both 7/10/23.

## 2023-10-11 NOTE — PROGRESS NOTES
Subjective     Jaclyn Mills is a 78 y.o. female who presents for the following: Skin Check (Pt concerned about area on Right Cheek.). She has been using Efudex cream on the spot on her right cheek that typically makes it go away but has come back. She thinks it is a pre cancer. She also has a lesion on the right arm that she thinks may be a wart but is bothersome.    Review of Systems:  No other skin or systemic complaints other than what is documented elsewhere in the note.    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    Past Surgical History:  Jaclyn Mills  has a past surgical history that includes Other surgical history (10/19/2020); Hernia repair (11/30/2020); Total abdominal hysterectomy (12/09/2020); Umbilical hernia repair (12/09/2020); and Rotator cuff repair (11/30/2020).    Family History:  Patient family history includes Alzheimer's disease in her father; Asthma in her brother; Breast cancer in her daughter; Cancer in her brother and daughter; Diabetes in her mother; Lung cancer in her sister; No Known Problems in her brother, daughter, and sister; Stroke in her mother.    Social History:  Jaclyn Mills  reports that she quit smoking about 13 years ago. Her smoking use included cigarettes. She has never used smokeless tobacco. She reports current alcohol use. She  reports that she does not use drugs.    Allergies:  Amoxicillin-pot clavulanate, Sulfa (sulfonamide antibiotics), and Hydrocodone-acetaminophen    Current Medications / CAM's:    Current Outpatient Medications:     albuterol (Proventil HFA) 90 mcg/actuation inhaler, Inhale 2 puffs 4 times a day as needed., Disp: , Rfl:     albuterol 0.63 mg/3 mL nebulizer solution, Take by nebulization., Disp: , Rfl:     ALPRAZolam (Xanax) 0.5 mg tablet, Take 1 tablet (0.5 mg) by mouth 2 times a day as needed for anxiety., Disp: 7 tablet, Rfl:     calcium carb,gluc/mag ox,gluc (CALCIUM MAGNESIUM ORAL), 1 tablet once daily., Disp: , Rfl:     doxycycline (Adoxa) 100 mg tablet, Take 1 tablet (100 mg) by mouth 2 times a day., Disp: , Rfl:     ergocalciferol (Vitamin D-2) 1.25 MG (46207 UT) capsule, Take 1 capsule (1,250 mcg) by mouth 1 (one) time per week., Disp: , Rfl:     gabapentin (Neurontin) 100 mg capsule, Take 1 capsule (100 mg) by mouth 3 times a day., Disp: 270 capsule, Rfl: 3    hydroCHLOROthiazide (HYDRODiuril) 25 mg tablet, Take 1 tablet (25 mg) by mouth once daily., Disp: , Rfl:     ipratropium-albuteroL (Duo-Neb) 0.5-2.5 mg/3 mL nebulizer solution, Take 3 mL by nebulization every 6 hours if needed for wheezing or shortness of breath., Disp: 180 mL, Rfl:     lidocaine (Xylocaine) 2 % solution, Take by mouth., Disp: , Rfl:     meloxicam (Mobic) 15 mg tablet, Take 1 tablet (15 mg) by mouth once daily., Disp: , Rfl:     multivitamin tablet, Take by mouth., Disp: , Rfl:     potassium chloride CR (Klor-Con M20) 20 mEq ER tablet, Take 1 tablet (20 mEq) by mouth once daily. With food, Disp: , Rfl:     tiZANidine (Zanaflex) 4 mg tablet, Take 1 tablet (4 mg) by mouth every 8 hours if needed for muscle spasms., Disp: 60 tablet, Rfl: 1    triamcinolone (Kenalog) 0.1 % cream, Triamcinolone Acetonide 0.1 % External Cream  Quantity: 30  Refills: 0      Start : 9-Jun-2021  Active, Disp: , Rfl:     zolpidem (Ambien) 10 mg tablet, TAKE 1  TABLET BY MOUTH EVERY DAY AT BEDTIME AS NEEDED FOR 90 DAYS, Disp: 90 tablet, Rfl: 1    fluorouracil (Efudex) 5 % cream, Apply topically 2 times a day. Apply to the affected moses of the right lower cheek twice daily for a total of 14 days. Wash hands thoroughly after each application., Disp: 40 g, Rfl: 0    losartan (Cozaar) 100 mg tablet, Take 1 tablet (100 mg) by mouth once daily. (Patient not taking: Reported on 10/11/2023), Disp: , Rfl:      Objective   Well appearing patient in no apparent distress; mood and affect are within normal limits.    A full examination was performed including scalp, head, eyes, ears, nose, lips, neck, chest, axillae, abdomen, back, buttocks, bilateral upper extremities, bilateral lower extremities, hands, feet, fingers, toes, fingernails, and toenails. All findings within normal limits unless otherwise noted below.    - scattered tan macules, telangiectasias, and general photo-damage    - scattered small bright red papules and macules    - Scattered waxy tan/grey/brown papules with horn cysts    - scattered regular brown macules and papules    Left distal lateral thigh, Right Buccal Cheek, Right Malar Cheek, Right Upper Arm - Anterior  Involving the right lower cheek is a lightly pigmented irregular macule with fine scale. Involving the right upper arm is a skin colored papule with hyperkeratosis and prurigo features. Involving the left distal lateral thigh is a scaly erythematous macule.    Left Medial Thigh, Right Medial Thigh  Light red patches and erythema of the bilateral groin    Scalp  Hair thinning without definitive alopetic patches    Right Lower Leg - Anterior  - Well healed scar at prior treatment sites without visual or palpable evidence of recurrence.     Left Upper Eyelid, Right Upper Eyelid  Erythema of bilateral upper and lower eyelids         Assessment/Plan   Actinic keratosis (4)  Right Upper Arm - Anterior; Left distal lateral thigh; Right Malar Cheek; Right Buccal  Cheek    - Recommend Efudex cream twice daily for 14 days to the right lower cheek and to the right upper cheek for 4 more days. Right upper cheek erythema is adequate response to Efudex cream but small amount of hyperkeratosis remains present.  - Cryotherapy to 2 lesions of the right upper arm and left thigh. Advised patient to return for sooner appointment than scheduled skin check if lesions do not resolve or recur.    Destr of lesion - Left distal lateral thigh, Right Upper Arm - Anterior  Complexity: simple    Destruction method: cryotherapy    Informed consent: discussed and consent obtained    Lesion destroyed using liquid nitrogen: Yes    Cryotherapy cycles:  1  Outcome: patient tolerated procedure well with no complications    Post-procedure details: wound care instructions given      Dermatitis  Left Medial Thigh; Right Medial Thigh    - Favor irritant contact dermatitis related to sweat and skin folds  - Recommend Desitin cream patient has at home     Telogen effluvium  Scalp    - Patient reports hair shedding that occurred following ankle surgery this year but the shedding has slowed down  - Discussed that this can often be related to stress and is transient. No treatment is required but topical medications can be effective.  - Recommended minoxidil 5% solution OTC daily until resolved    Sun-damaged skin    Actinically damaged skin-  - Sun protective behavior reviewed and encouraged including the use of over-the-counter sunscreen with SPF30+ daily (reapply every 1.5 hours when outdoors), UPF clothing, broad rimmed hats, sunglasses, and avoidance of midday sun. Home skin monitoring encouraged and how to monitor for skin cancer (changing or new moles, new rapidly growing or non-healing lesions) reviewed. Patient encouraged to call with interval concerns or changes.      Related Procedures  Follow Up In Dermatology - Established Patient    Related Medications  fluorouracil (Efudex) 5 % cream  Apply  topically 2 times a day. Apply to the affected moses of the right lower cheek twice daily for a total of 14 days. Wash hands thoroughly after each application.    Scar conditions and fibrosis of skin  Right Lower Leg - Anterior    - Well healed scar(s) at sites of prior skin cancer and/or dysplastic nevi.  - Sun protective behavior reviewed and encouraged including the use of over-the-counter sunscreen with SPF30+ daily (reapply every 1.5 hours when outdoors), UPF clothing, broad rimmed hats, sunglasses, and avoidance of midday sun. Home skin monitoring encouraged and how to monitor for skin cancer (changing or new moles, new rapidly growing or non-healing lesions) reviewed. Patient encouraged to call with interval concerns or changes.       Costa angioma    Cherry angioma(s)  - Discussed benign nature and that no treatment is necessary unless it becomes painful or increases in size. Patient opts for clinical monitoring at this time.      Seborrheic keratoses    Seborrheic keratosis (-es)  - Discussed benign nature and that no treatment is necessary unless it becomes painful or increases in size. Patient opts for clinical monitoring at this time.      Multiple benign melanocytic nevi    Benign melanocytic nevi  - Discussed benign nature and that no treatment is necessary unless it becomes painful or increases in size. Patient opts for clinical monitoring at this time.    - Sun protective behavior reviewed and encouraged including the use of over-the-counter sunscreen with SPF30+ daily (reapply every 1.5 hours when outdoors), UPF clothing, broad rimmed hats, sunglasses, and avoidance of midday sun. Home skin monitoring encouraged and how to monitor for skin cancer (changing or new moles, new rapidly growing or non-healing lesions) reviewed. Patient encouraged to call with interval concerns or changes.      Inflammation, skin  Left Upper Eyelid; Right Upper Eyelid    - Patient reports irritation of the eyes and use of  eye drops regularly   - Recommend Pataday antihistamine eye drops to each eye daily     Follow up for FBSE in 6 months or sooner if AKs treated with cryotherapy do not resolve or recur.    Radha Mcconnell MD

## 2023-10-12 NOTE — PROGRESS NOTES
I saw and evaluated the patient, participating in the key elements of the service.  I discussed the findings, assessment and plan with the resident and agree with resident’s findings and plan as documented in the resident's note.  I was immediately available for the entirety of the procedure(s) and present for the key and critical portions.     Anastasia Brandon MD

## 2023-10-14 DIAGNOSIS — E55.9 VITAMIN D DEFICIENCY: ICD-10-CM

## 2023-10-14 DIAGNOSIS — I10 ESSENTIAL (PRIMARY) HYPERTENSION: ICD-10-CM

## 2023-10-18 RX ORDER — ERGOCALCIFEROL 1.25 MG/1
CAPSULE ORAL
Qty: 12 CAPSULE | Refills: 3 | Status: SHIPPED | OUTPATIENT
Start: 2023-10-18

## 2023-10-18 RX ORDER — POTASSIUM CHLORIDE 1500 MG/1
TABLET, EXTENDED RELEASE ORAL
Qty: 90 TABLET | Refills: 3 | Status: SHIPPED | OUTPATIENT
Start: 2023-10-18

## 2023-11-20 PROBLEM — R92.30 DENSE BREAST TISSUE: Status: ACTIVE | Noted: 2023-11-20

## 2023-11-20 PROBLEM — Z12.39 BREAST CANCER SCREENING, HIGH RISK PATIENT: Status: ACTIVE | Noted: 2023-11-20

## 2023-11-20 NOTE — PROGRESS NOTES
Jaclyn Mills female   1945 78 y.o.   [id number]    Chief Complaint  Follow up annual mammogram and exam, high risk surveillance care.     History Of Present Illness  Jaclyn Mills is a very pleasant 78 y.o. female [facility]. She had screen detected abnormality 2020 core biopsy yielded left breast atypical lobular hyperplasia (ALH). She underwent left Magseed localized excisional biopsy on 11/10/2020, final pathology yielded usual ductal hyperplasia (no atypia). She has family history of breast cancer in her daughter, age 42. She presents today for her annual mammogram and clinical exam. She denies any palpable breast masses, skin changes or nipple discharge.     BREAST IMAGIN2023 Bilateral screening mammogram, indicates BI-RADS Category 2.    REPRODUCTIVE HISTORY: menarche age 15, , first birth age 20, she did not breastfeed, OCPs x a few years, menopause age unknown (she underwent partial hysterectomy with removal of right ovary at age 32 for gonorrhea infection), no HRT, heterogeneously dense tissue     FAMILY CANCER HISTORY:   Daughter: Breast cancer age 42, no genetic testing  Sister: Lung cancer age 65  Brother: Prostate cancer age 79  Nephew: Melanoma age 40s     Review of Systems  Constitutional:  Negative for appetite change, fatigue, fever and unexpected weight change.   HENT:  Negative for ear pain, hearing loss, nosebleeds, sore throat and trouble swallowing.    Eyes:  Negative for discharge, itching and visual disturbance.   Breast: As stated in HPI.  Respiratory:  Negative for cough, chest tightness and shortness of breath.    Cardiovascular:  Negative for chest pain, palpitations and leg swelling.   Gastrointestinal:  Negative for abdominal pain, constipation, diarrhea and nausea.   Endocrine: Negative for cold intolerance and heat intolerance.   Genitourinary:  Negative for dysuria, frequency, hematuria, pelvic pain  and vaginal bleeding.   Musculoskeletal:  Negative for arthralgias, back pain, gait problem, joint swelling and myalgias.   Skin:  Negative for color change and rash.   Allergic/Immunologic: Negative for environmental allergies and food allergies.   Neurological:  Negative for dizziness, tremors, speech difficulty, weakness, numbness and headaches.   Hematological:  Does not bruise/bleed easily.   Psychiatric/Behavioral:  Negative for agitation, dysphoric mood and sleep disturbance. The patient is not nervous/anxious.       Past Medical History  She has a past medical history of Other abnormal and inconclusive findings on diagnostic imaging of breast (12/09/2020), Other specified disorders of breast (12/09/2020), Personal history of other diseases of the circulatory system (12/09/2020), Personal history of other diseases of the respiratory system (11/26/2019), and Squamous cell carcinoma of skin of right lower limb, including hip (12/09/2020).    Surgical History  She has a past surgical history that includes Other surgical history (10/19/2020); Hernia repair (11/30/2020); Total abdominal hysterectomy (12/09/2020); Umbilical hernia repair (12/09/2020); and Rotator cuff repair (11/30/2020).     Family History  Cancer-related family history includes Breast cancer in her daughter; Cancer in her brother and daughter; Lung cancer in her sister.     Social History  She reports that she quit smoking about 13 years ago. Her smoking use included cigarettes. She has never used smokeless tobacco. She reports current alcohol use. She reports that she does not use drugs.    Allergies  Amoxicillin-pot clavulanate, Sulfa (sulfonamide antibiotics), and Hydrocodone-acetaminophen    Medications  Current Outpatient Medications   Medication Instructions    albuterol (Proventil HFA) 90 mcg/actuation inhaler 2 puffs, inhalation, 4 times daily PRN    albuterol 0.63 mg/3 mL nebulizer solution nebulization    ALPRAZolam (XANAX) 0.5 mg,  oral, 2 times daily PRN    calcium carb,gluc/mag ox,gluc (CALCIUM MAGNESIUM ORAL) 1 tablet, Daily    doxycycline (Adoxa) 100 mg tablet 1 tablet, oral, 2 times daily    ergocalciferol (Vitamin D-2) 1.25 MG (51617 UT) capsule TAKE 1 CAPSULE BY MOUTH ONE TIME PER WEEK FOR 90 DAYS    gabapentin (NEURONTIN) 100 mg, oral, 3 times daily    hydroCHLOROthiazide (HYDRODIURIL) 25 mg, oral, Daily    ipratropium-albuteroL (Duo-Neb) 0.5-2.5 mg/3 mL nebulizer solution 3 mL, nebulization, Every 6 hours PRN    lidocaine (Xylocaine) 2 % solution oral    losartan (COZAAR) 100 mg, oral, Daily    meloxicam (MOBIC) 15 mg, oral, Daily    multivitamin tablet oral    potassium chloride CR (Klor-Con M20) 20 mEq ER tablet 1 tablet, oral, Daily, With food    potassium chloride CR 20 mEq ER tablet TAKE 1 TABLET BY MOUTH EVERY DAY WITH FOOD FOR 90 DAYS    tiZANidine (ZANAFLEX) 4 mg, oral, Every 8 hours PRN    triamcinolone (Kenalog) 0.1 % cream Triamcinolone Acetonide 0.1 % External Cream   Quantity: 30  Refills: 0        Start : 9-Jun-2021   Active    zolpidem (Ambien) 10 mg tablet TAKE 1 TABLET BY MOUTH EVERY DAY AT BEDTIME AS NEEDED FOR 90 DAYS       Last Recorded Vitals  Blood pressure 162/88, pulse 108, temperature 36.6 °C (97.9 °F), temperature source Skin, weight 90 kg (198 lb 6.4 oz).    Physical Exam  Chest:       Patient is alert and oriented x3 and in a relaxed and appropriate mood. Her gait is steady and hand grasps are equal. Sclera is clear. The breasts are nearly symmetrical. Left breast has a well healed excisional biopsy incision. The tissue is soft without palpable abnormalities, discrete nodules or masses. The skin and nipples appear normal. There is no cervical, supraclavicular or axillary lymphadenopathy. Heart rate and rhythm normal, S1 and S2 appreciated. The lungs are clear to auscultation bilaterally. Abdomen is soft and non-tender.      Relevant Results and Imaging  Study Result    Narrative & Impression   Interpreted  By:  Dorothy Vela,   STUDY:  BI MAMMO BILATERAL SCREENING TOMOSYNTHESIS;  11/22/2023 2:33 pm      ACCESSION NUMBER(S):  [id number]      ORDERING CLINICIAN:  JENNIFER LAKE      INDICATION:  Screening. History of left breast excisional biopsy for atypical  lobular hyperplasia. Family history of breast cancer in patient's  daughter.      COMPARISON:  11/18/2022, and all prior breast imaging exams available in our  system at the time of dictation.      FINDINGS:  2D and tomosynthesis images were reviewed at 1 mm slice thickness.      Density:  There are areas of scattered fibroglandular tissue.      Stable benign postsurgical changes with scarring and surgical clips  are seen in upper outer left breast at middle depth. No suspicious  masses or calcifications are identified in either breast.      IMPRESSION:  Stable benign left breast postsurgical changes. No mammographic  evidence of malignancy.      BI-RADS CATEGORY:  BI-RADS Category:  2 Benign.  Recommendation:  Routine Screening Mammogram in 1 Year.  Recommended Date:  1 Year.  Laterality:  Bilateral.      For any future breast imaging appointments, please call [phone]  ([phone]).          MACRO:  None      Signed by: Dorothy Vela 11/22/2023 2:37 PM       Time was spent viewing digital images. I explained the results in depth, along with suggested explanation for follow up recommendations based on the testing results. BI-RADS Category 2    Orders  Orders Placed This Encounter   Procedures    BI mammo bilateral screening tomosynthesis     Standing Status:   Future     Standing Expiration Date:   1/22/2025     Order Specific Question:   Reason for exam:     Answer:   screening mammogram     Order Specific Question:   Radiologist to Determine Optimal Study     Answer:   Yes     Order Specific Question:   Release result to [facility]     Answer:   Immediate     Order Specific Question:   Is this exam part of a Research Study? If Yes, link this order to the  research study     Answer:   No    MR breast bilateral w contrast full protocol     Standing Status:   Future     Standing Expiration Date:   1/22/2025     Order Specific Question:   Reason for exam:     Answer:   high risk surveillance care, ALH, dense breast tissue     Order Specific Question:   Does the patient have a Cochlear Implant, Pacemaker, Defibrilator, Pacing Wire, Brain Aneurysm Clip, Implanted Nerve or Bone Graft Simulator, Implanted Breast Tissue Expander, Glucose Monitor or Neulasta Device?     Answer:   No     Order Specific Question:   Radiologist to Determine Optimal Study     Answer:   Yes     Order Specific Question:   Release result to [id number]     Answer:   Immediate     Order Specific Question:   Is this exam part of a Research Study? If Yes, link this order to the research study     Answer:   No     Visit Diagnosis  1. Breast cancer screening, high risk patient        2. Dense breast tissue          Assessment/Plan  High risk surveillance care, normal clinical exam and imaging, history of left breast ALH, history of benign left breast excisional biopsy, family history breast cancer, heterogeneously dense    Plan:  Return November 2024 for bilateral screening mammogram and office visit. Proceed with full breast MRI now as requested d/t meeting her deductible. Declined endocrine therapy.    Patient Discussion/Summary  Your clinical examination and imaging are normal. Please return in one year for mammogram and office visit or sooner if you have any problems or concerns.     High risk breast surveillance care plan:  Yearly mammogram with digital breast tomosynthesis  Twice yearly clinical breast examinations  Breast MRI (to schedule call [phone])  Monthly self breast examinations &/or regular self breast awareness  Vitamin D3 2000 IU/daily (over the counter) unless your PCP recommends you take a specific dose  Exercise 3-4 times per week for 45-60 minutes  Limit alcohol to 3-4 drinks per  week if you are menopausal  Eat healthy low-fat diet with lots of vegetable & fruits  Risk models indicate personal risk of breast cancer in the next 5 years and  lifetime (age 85-90):  Breast Cancer Risk Assessment Tool (Sammi): 5-year risk 7.4% (average 2.2%), lifetime risk 14.5% (average 4.4%).   Garether-Navarro: lifetime risk 20.2%, (average 2.8%)    Risk model indicates you are eligible for endocrine therapy with Tamoxifen, Raloxifene or Aromatase inhibitor. Endocrine therapy reduces lifetime risk of breast cancer by up to 50% when taken for 5 years. There is an alternative low dose Tamoxifen which can be taken for only 3 years.      IMPORTANT INFORMATION REGARDING YOUR RESULTS    You can see your health information, review clinical summaries from office visits & test results online when you follow your health with MY  Chart, a personal health record. To sign up go to [email or web address]. If you need assistance with signing up or trouble getting into your account call [facility] Patient Line 24/7 at [phone].    My office phone number is [phone] if you need to get in touch with me or have additional questions or concerns. Thank you for choosing [facility] and trusting me as your healthcare provider. I look forward to seeing you again at your next office visit. I am honored to be a provider on your health care team and I remain dedicated to helping you achieve your health goals.    Corina Chavez, APRN-CNP

## 2023-11-22 ENCOUNTER — APPOINTMENT (OUTPATIENT)
Dept: SURGICAL ONCOLOGY | Facility: CLINIC | Age: 78
End: 2023-11-22
Payer: COMMERCIAL

## 2023-11-22 ENCOUNTER — OFFICE VISIT (OUTPATIENT)
Dept: SURGICAL ONCOLOGY | Facility: CLINIC | Age: 78
End: 2023-11-22
Payer: COMMERCIAL

## 2023-11-22 ENCOUNTER — ANCILLARY PROCEDURE (OUTPATIENT)
Dept: RADIOLOGY | Facility: CLINIC | Age: 78
End: 2023-11-22
Payer: COMMERCIAL

## 2023-11-22 ENCOUNTER — APPOINTMENT (OUTPATIENT)
Dept: RADIOLOGY | Facility: CLINIC | Age: 78
End: 2023-11-22
Payer: COMMERCIAL

## 2023-11-22 VITALS
DIASTOLIC BLOOD PRESSURE: 88 MMHG | BODY MASS INDEX: 37.6 KG/M2 | SYSTOLIC BLOOD PRESSURE: 162 MMHG | HEART RATE: 108 BPM | WEIGHT: 198.4 LBS | TEMPERATURE: 97.9 F

## 2023-11-22 DIAGNOSIS — Z12.39 BREAST CANCER SCREENING, HIGH RISK PATIENT: Primary | ICD-10-CM

## 2023-11-22 DIAGNOSIS — Z12.31 ENCOUNTER FOR SCREENING MAMMOGRAM FOR MALIGNANT NEOPLASM OF BREAST: ICD-10-CM

## 2023-11-22 DIAGNOSIS — Z12.31 BREAST CANCER SCREENING BY MAMMOGRAM: ICD-10-CM

## 2023-11-22 DIAGNOSIS — R92.30 DENSE BREAST TISSUE: ICD-10-CM

## 2023-11-22 PROCEDURE — 3079F DIAST BP 80-89 MM HG: CPT | Performed by: NURSE PRACTITIONER

## 2023-11-22 PROCEDURE — 77063 BREAST TOMOSYNTHESIS BI: CPT

## 2023-11-22 PROCEDURE — 1036F TOBACCO NON-USER: CPT | Performed by: NURSE PRACTITIONER

## 2023-11-22 PROCEDURE — 1159F MED LIST DOCD IN RCRD: CPT | Performed by: NURSE PRACTITIONER

## 2023-11-22 PROCEDURE — 77063 BREAST TOMOSYNTHESIS BI: CPT | Mod: BILATERAL PROCEDURE | Performed by: STUDENT IN AN ORGANIZED HEALTH CARE EDUCATION/TRAINING PROGRAM

## 2023-11-22 PROCEDURE — 99214 OFFICE O/P EST MOD 30 MIN: CPT | Performed by: NURSE PRACTITIONER

## 2023-11-22 PROCEDURE — 77067 SCR MAMMO BI INCL CAD: CPT | Mod: BILATERAL PROCEDURE | Performed by: STUDENT IN AN ORGANIZED HEALTH CARE EDUCATION/TRAINING PROGRAM

## 2023-11-22 PROCEDURE — 3077F SYST BP >= 140 MM HG: CPT | Performed by: NURSE PRACTITIONER

## 2023-11-22 PROCEDURE — 1160F RVW MEDS BY RX/DR IN RCRD: CPT | Performed by: NURSE PRACTITIONER

## 2023-11-22 PROCEDURE — 1126F AMNT PAIN NOTED NONE PRSNT: CPT | Performed by: NURSE PRACTITIONER

## 2023-11-22 ASSESSMENT — PAIN SCALES - GENERAL: PAINLEVEL: 0-NO PAIN

## 2023-11-28 ENCOUNTER — TELEPHONE (OUTPATIENT)
Dept: PRIMARY CARE | Facility: CLINIC | Age: 78
End: 2023-11-28

## 2023-11-28 DIAGNOSIS — Z01.89 ENCOUNTER FOR ROUTINE LABORATORY TESTING: Primary | ICD-10-CM

## 2023-11-28 NOTE — TELEPHONE ENCOUNTER
Patient states that she is is scheduled for MRI on 12/29, states that per radiology she needs a more recent BMP.  MRI ordered by another provider, but they advised her to call PCP for order. Please advise.

## 2023-12-08 ENCOUNTER — APPOINTMENT (OUTPATIENT)
Dept: RADIOLOGY | Facility: HOSPITAL | Age: 78
End: 2023-12-08
Payer: COMMERCIAL

## 2023-12-08 ENCOUNTER — APPOINTMENT (OUTPATIENT)
Dept: RADIOLOGY | Facility: CLINIC | Age: 78
End: 2023-12-08
Payer: COMMERCIAL

## 2023-12-27 ENCOUNTER — LAB (OUTPATIENT)
Dept: LAB | Facility: LAB | Age: 78
End: 2023-12-27
Payer: COMMERCIAL

## 2023-12-27 DIAGNOSIS — Z01.89 ENCOUNTER FOR ROUTINE LABORATORY TESTING: ICD-10-CM

## 2023-12-27 LAB
ANION GAP SERPL CALC-SCNC: 13 MMOL/L (ref 10–20)
BUN SERPL-MCNC: 17 MG/DL (ref 6–23)
CALCIUM SERPL-MCNC: 8.9 MG/DL (ref 8.6–10.6)
CHLORIDE SERPL-SCNC: 104 MMOL/L (ref 98–107)
CO2 SERPL-SCNC: 30 MMOL/L (ref 21–32)
CREAT SERPL-MCNC: 0.75 MG/DL (ref 0.5–1.05)
GFR SERPL CREATININE-BSD FRML MDRD: 83 ML/MIN/1.73M*2
GLUCOSE SERPL-MCNC: 136 MG/DL (ref 74–99)
POTASSIUM SERPL-SCNC: 4.3 MMOL/L (ref 3.5–5.3)
SODIUM SERPL-SCNC: 143 MMOL/L (ref 136–145)

## 2023-12-27 PROCEDURE — 36415 COLL VENOUS BLD VENIPUNCTURE: CPT

## 2023-12-27 PROCEDURE — 80048 BASIC METABOLIC PNL TOTAL CA: CPT

## 2023-12-29 ENCOUNTER — HOSPITAL ENCOUNTER (OUTPATIENT)
Dept: RADIOLOGY | Facility: HOSPITAL | Age: 78
Discharge: HOME | End: 2023-12-29
Payer: COMMERCIAL

## 2023-12-29 DIAGNOSIS — Z12.39 BREAST CANCER SCREENING, HIGH RISK PATIENT: ICD-10-CM

## 2023-12-29 DIAGNOSIS — R92.30 DENSE BREAST TISSUE: ICD-10-CM

## 2023-12-29 PROCEDURE — 77049 MRI BREAST C-+ W/CAD BI: CPT | Performed by: STUDENT IN AN ORGANIZED HEALTH CARE EDUCATION/TRAINING PROGRAM

## 2023-12-29 PROCEDURE — 77049 MRI BREAST C-+ W/CAD BI: CPT

## 2023-12-29 PROCEDURE — 2550000001 HC RX 255 CONTRASTS: Performed by: NURSE PRACTITIONER

## 2023-12-29 PROCEDURE — A9575 INJ GADOTERATE MEGLUMI 0.1ML: HCPCS | Performed by: NURSE PRACTITIONER

## 2023-12-29 RX ORDER — GADOTERATE MEGLUMINE 376.9 MG/ML
20 INJECTION INTRAVENOUS
Status: COMPLETED | OUTPATIENT
Start: 2023-12-29 | End: 2023-12-29

## 2023-12-29 RX ADMIN — GADOTERATE MEGLUMINE 20 ML: 376.9 INJECTION INTRAVENOUS at 16:48

## 2024-01-02 ENCOUNTER — TELEPHONE (OUTPATIENT)
Dept: SURGICAL ONCOLOGY | Facility: HOSPITAL | Age: 79
End: 2024-01-02
Payer: COMMERCIAL

## 2024-01-02 NOTE — TELEPHONE ENCOUNTER
Result Communication    Spoke Jaclyn Mills with regarding normal MRI results.    Resulted Orders   MR breast bilateral w contrast full protocol    Narrative    Interpreted By:  Supriya Galvan,   STUDY:  BI MR BREAST BILATERAL WITH CONTRAST FULL PROTOCOL;  12/29/2023 4:00  pm      ACCESSION NUMBER(S):  [id number]      ORDERING CLINICIAN:  ROULA HARRIS      INDICATION:  High risk screening. Benign left excisional biopsy.      COMPARISON:  MAMMOGRAM 11/22/2023, MRI 06/10/2022.      TECHNIQUE:  Using a dedicated breast coil, STIR axial and T1-weighted fat  saturation axial images of the breasts were obtained, the latter both  before and after intravenous administration of Gadolinium DTPA. On an  independent workstation, 3-D images were formulated using [employer]  including time enhancement curves, subtraction images and MIP images.      Intravenous contrast:  20 mL of Dotarem      FINDINGS:  There is  symmetric  minimal bilateral background enhancement.      Density:  There are areas of scattered fibroglandular tissue.      RIGHT BREAST:  No suspicious mass or nonmass enhancement is  identified.      No axillary or internal mammary lymphadenopathy is appreciated.      LEFT BREAST:  Postoperative scarring and signal void from surgical  clips is seen in the deep upper outer breast. No suspicious mass or  nonmass enhancement is identified.      No axillary or internal mammary lymphadenopathy is appreciated.      NON-BREAST FINDINGS:  None.        Impression    No MRI evidence of malignancy in either breast.      BI-RADS CATEGORY:  BI-RADS Category:  2 Benign.  Recommendation:  Routine Screening Breast MRI in 1 Year.  Recommended Date:  1 Year.  Laterality:  Bilateral.      For any future breast imaging appointments, please call [phone]).          MACRO:  None      Signed by: Supriya Galvan 1/2/2024 7:57 AM  Dictation workstation:   [id number]       11:14 AM

## 2024-01-08 ENCOUNTER — DOCUMENTATION (OUTPATIENT)
Dept: PHYSICAL THERAPY | Facility: CLINIC | Age: 79
End: 2024-01-08
Payer: COMMERCIAL

## 2024-01-08 NOTE — PROGRESS NOTES
Cancel/no showPhysical Therapy    Discharge Summary    Name: Jaclyn Mills  MRN: [id number]  : 1945  Date: 24    Discharge Summary: PT    Discharge Information: Date of discharge 2024, Date of last visit 10/10/2023, Date of evaluation 10/10/2023, Number of attended visits 1, Referred by Dr. Corrales, and Referred for lymphedema    Therapy Summary: Pt only attended one PT visit.  Did not return for further treatment    Discharge Status: discharged     Rehab Discharge Reason: Failed to schedule and/or keep follow-up appointment(s)

## 2024-01-09 ENCOUNTER — HOSPITAL ENCOUNTER (OUTPATIENT)
Dept: RADIOLOGY | Facility: HOSPITAL | Age: 79
Discharge: HOME | End: 2024-01-09
Payer: COMMERCIAL

## 2024-01-09 ENCOUNTER — OFFICE VISIT (OUTPATIENT)
Dept: ORTHOPEDIC SURGERY | Facility: HOSPITAL | Age: 79
End: 2024-01-09
Payer: COMMERCIAL

## 2024-01-09 VITALS — WEIGHT: 200 LBS | BODY MASS INDEX: 37.91 KG/M2

## 2024-01-09 DIAGNOSIS — S82.891D CLOSED FRACTURE OF RIGHT ANKLE WITH ROUTINE HEALING, SUBSEQUENT ENCOUNTER: ICD-10-CM

## 2024-01-09 DIAGNOSIS — M76.71 PERONEAL TENDINITIS OF RIGHT LOWER EXTREMITY: Primary | ICD-10-CM

## 2024-01-09 DIAGNOSIS — F41.9 ANXIETY: ICD-10-CM

## 2024-01-09 DIAGNOSIS — T84.84XA PAINFUL ORTHOPAEDIC HARDWARE (CMS-HCC): ICD-10-CM

## 2024-01-09 PROCEDURE — 2500000005 HC RX 250 GENERAL PHARMACY W/O HCPCS: Performed by: ORTHOPAEDIC SURGERY

## 2024-01-09 PROCEDURE — 73610 X-RAY EXAM OF ANKLE: CPT | Mod: RT

## 2024-01-09 PROCEDURE — 2500000004 HC RX 250 GENERAL PHARMACY W/ HCPCS (ALT 636 FOR OP/ED): Performed by: ORTHOPAEDIC SURGERY

## 2024-01-09 PROCEDURE — 99214 OFFICE O/P EST MOD 30 MIN: CPT | Performed by: ORTHOPAEDIC SURGERY

## 2024-01-09 PROCEDURE — 20605 DRAIN/INJ JOINT/BURSA W/O US: CPT | Performed by: ORTHOPAEDIC SURGERY

## 2024-01-09 PROCEDURE — 73610 X-RAY EXAM OF ANKLE: CPT | Mod: RIGHT SIDE | Performed by: STUDENT IN AN ORGANIZED HEALTH CARE EDUCATION/TRAINING PROGRAM

## 2024-01-09 PROCEDURE — 1036F TOBACCO NON-USER: CPT | Performed by: ORTHOPAEDIC SURGERY

## 2024-01-09 PROCEDURE — 1126F AMNT PAIN NOTED NONE PRSNT: CPT | Performed by: ORTHOPAEDIC SURGERY

## 2024-01-09 PROCEDURE — 1159F MED LIST DOCD IN RCRD: CPT | Performed by: ORTHOPAEDIC SURGERY

## 2024-01-09 RX ORDER — TRIAMCINOLONE ACETONIDE 40 MG/ML
40 INJECTION, SUSPENSION INTRA-ARTICULAR; INTRAMUSCULAR
Status: COMPLETED | OUTPATIENT
Start: 2024-01-09 | End: 2024-01-09

## 2024-01-09 RX ORDER — LIDOCAINE HYDROCHLORIDE 10 MG/ML
5 INJECTION INFILTRATION; PERINEURAL
Status: COMPLETED | OUTPATIENT
Start: 2024-01-09 | End: 2024-01-09

## 2024-01-09 RX ADMIN — TRIAMCINOLONE ACETONIDE 40 MG: 40 INJECTION, SUSPENSION INTRA-ARTICULAR; INTRAMUSCULAR at 17:11

## 2024-01-09 RX ADMIN — LIDOCAINE HYDROCHLORIDE 5 ML: 10 INJECTION, SOLUTION INFILTRATION; PERINEURAL at 17:11

## 2024-01-09 ASSESSMENT — PAIN SCALES - GENERAL: PAINLEVEL_OUTOF10: 0 - NO PAIN

## 2024-01-09 ASSESSMENT — PAIN - FUNCTIONAL ASSESSMENT: PAIN_FUNCTIONAL_ASSESSMENT: 0-10

## 2024-01-09 NOTE — PROGRESS NOTES
Subjective    Patient ID: Jack Mills is a 78 y.o. female.    Chief Complaint: Follow-up of the Right Ankle     Last Surgery: R distal fibula nonunion repair performed on 2/10/23. Initial ORIF of R trimalleolar ankle fracture in 7/22/22    Right Ankle       JACK presents to the office s/p R distal fibula nonunion repair performed on 2/10/23. Initial ORIF of R trimalleolar ankle fracture in 7/22/22. The patient states she is doing well. Patient reported that functionally she is doing better since the last time I saw her. She is able to ambulate independently. She has some discomfort and pain. She feels like there is a tendon snapping over the lateral aspect of the distal fibula. She is also noticed swelling.  She also have pain along the ankle joint line with activities.  Patient is back doing exercises at the gym with her daughter.  She tried using compression stockings but not very effectively. She denies calf pain. Pt denies any drainage from the incision site. Pt denies any fever, chills, night sweats, chest pain, or shortness of breath. Denies any calf swelling or calf pain.   Objective   Ortho Exam  The patient is well-nourished and well-developed and in no acute distress. The patient displayed normal mood and affect. The patient's pupils were equal, round sclerae are white. Patient's respirations appear to be regular and unlabored.      Focused exam of the RLE reveals a well approximated surgical incision without any drainage, erythema, or signs of infection. The patient is tender to palpation over the distal fibula. Range of motion with eversion and inversion reproduces pain. Sensation to light touch is intact distally. Distal pulses are palpable.  gastroc TA EHL intact  able to perform SLR/ knee ROM  calf supple and non tender to palpation  Patient does have nonpitting edema around the ankle.     Image Results:  I personally reviewed the radiographs of the R ankle today and found stable fixation  and appropriate alignment, no lucency, no loss of fixation, congruent joint space. There is interval callus formation of the fracture line no longer visible.  Minimal joint arthrosis     Assessment/Plan   Encounter Diagnoses:  Peroneal tendinitis of right lower extremity    Closed fracture of right ankle with routine healing, subsequent encounter    Painful orthopaedic hardware (CMS/HCC)  Patient is doing relatively well.  Her pain has improved however she has residual stiffness and pain around the ankle.  The pain is over the hardware likely irritation of the peroneal tendons.  She also had very mild arthrosis of the joint.  I discussed treatment option with her including conservative treatment.  I agree with her going to the gym and doing ankle strengthening and stretching exercises.  She may take over-the-counter anti-inflammatories as needed.  I discussed potential for hardware removal in the future.  She is still interested.  However she has a trip to [place] in May and would like to have this evaluated after that.  I discussed with trial of intra-articular corticosteroid injection as this could be therapeutic and diagnostic.  Patient was interested in injection today.  Patient will come back and see me for repeat injection in 3 months as needed.  Will obtain x-ray of the right ankle at that time.  I told her that she wants to have the hardware removed would like to get a CT scan to confirm healing of prior to removing the hardware.    Patient ID: Jaclyn Mills is a 78 y.o. female.    M Inj/Asp: R ankle on 1/9/2024 5:11 PM  Indications: pain  Details: 20 G needle, anteromedial approach  Medications: 40 mg triamcinolone acetonide 40 mg/mL; 5 mL lidocaine 10 mg/mL (1 %)  Procedure, treatment alternatives, risks and benefits explained, specific risks discussed. Consent was given by the patient. Immediately prior to procedure a time out was called to verify the correct patient, procedure, equipment, support  staff and site/side marked as required. Patient was prepped and draped in the usual sterile fashion.         Orders Placed This Encounter    Disability Placard    XR ankle right 3+ views     No follow-ups on file.

## 2024-01-10 RX ORDER — ZOLPIDEM TARTRATE 10 MG/1
TABLET ORAL
Qty: 90 TABLET | Refills: 2 | Status: SHIPPED | OUTPATIENT
Start: 2024-01-10

## 2024-02-08 DIAGNOSIS — I10 ESSENTIAL HYPERTENSION: ICD-10-CM

## 2024-02-08 RX ORDER — HYDROCHLOROTHIAZIDE 25 MG/1
25 TABLET ORAL DAILY
Qty: 90 TABLET | Refills: 3 | Status: SHIPPED | OUTPATIENT
Start: 2024-02-08 | End: 2025-02-02

## 2024-02-20 ENCOUNTER — OFFICE VISIT (OUTPATIENT)
Dept: OBSTETRICS AND GYNECOLOGY | Facility: CLINIC | Age: 79
End: 2024-02-20
Payer: COMMERCIAL

## 2024-02-20 VITALS
DIASTOLIC BLOOD PRESSURE: 76 MMHG | BODY MASS INDEX: 38.14 KG/M2 | WEIGHT: 202 LBS | HEIGHT: 61 IN | SYSTOLIC BLOOD PRESSURE: 156 MMHG

## 2024-02-20 DIAGNOSIS — Z12.31 SCREENING MAMMOGRAM FOR BREAST CANCER: ICD-10-CM

## 2024-02-20 DIAGNOSIS — Z79.890 ENCOUNTER FOR MONITORING PRIMARY ESTROGEN REPLACEMENT THERAPY: Primary | ICD-10-CM

## 2024-02-20 DIAGNOSIS — Z51.81 ENCOUNTER FOR MONITORING PRIMARY ESTROGEN REPLACEMENT THERAPY: Primary | ICD-10-CM

## 2024-02-20 DIAGNOSIS — Z51.81 ENCOUNTER FOR MONITORING PRIMARY ESTROGEN REPLACEMENT THERAPY: ICD-10-CM

## 2024-02-20 DIAGNOSIS — Z79.890 ENCOUNTER FOR MONITORING PRIMARY ESTROGEN REPLACEMENT THERAPY: ICD-10-CM

## 2024-02-20 PROCEDURE — 3078F DIAST BP <80 MM HG: CPT | Performed by: OBSTETRICS & GYNECOLOGY

## 2024-02-20 PROCEDURE — 1036F TOBACCO NON-USER: CPT | Performed by: OBSTETRICS & GYNECOLOGY

## 2024-02-20 PROCEDURE — G0101 CA SCREEN;PELVIC/BREAST EXAM: HCPCS | Performed by: OBSTETRICS & GYNECOLOGY

## 2024-02-20 PROCEDURE — 1126F AMNT PAIN NOTED NONE PRSNT: CPT | Performed by: OBSTETRICS & GYNECOLOGY

## 2024-02-20 PROCEDURE — 1159F MED LIST DOCD IN RCRD: CPT | Performed by: OBSTETRICS & GYNECOLOGY

## 2024-02-20 PROCEDURE — 3077F SYST BP >= 140 MM HG: CPT | Performed by: OBSTETRICS & GYNECOLOGY

## 2024-02-20 RX ORDER — ESTRADIOL 1 MG/1
1 TABLET ORAL DAILY
Qty: 90 TABLET | Refills: 3 | Status: SHIPPED | OUTPATIENT
Start: 2024-02-20 | End: 2025-02-19

## 2024-02-20 NOTE — PROGRESS NOTES
Subjective   Patient ID: Jaclyn Potts is a 78 y.o. female who presents for Well woman visit.  MR breast bilateral w contrast full protocol  Status: Final result    Study Result    Narrative & Impression  Interpreted By:  Supriya Galvan,   STUDY:  BI MR BREAST BILATERAL WITH CONTRAST FULL PROTOCOL;  12/29/2023 4:00  pm      ACCESSION NUMBER(S):  [id number]      ORDERING CLINICIAN:  ROULA HARRIS      INDICATION:  High risk screening. Benign left excisional biopsy.      COMPARISON:  MAMMOGRAM 11/22/2023, MRI 06/10/2022.      TECHNIQUE:  Using a dedicated breast coil, STIR axial and T1-weighted fat  saturation axial images of the breasts were obtained, the latter both  before and after intravenous administration of Gadolinium DTPA. On an  independent workstation, 3-D images were formulated using [employer]  including time enhancement curves, subtraction images and MIP images.      Intravenous contrast:  20 mL of Dotarem      FINDINGS:  There is  symmetric  minimal bilateral background enhancement.      Density:  There are areas of scattered fibroglandular tissue.      RIGHT BREAST:  No suspicious mass or nonmass enhancement is  identified.      No axillary or internal mammary lymphadenopathy is appreciated.      LEFT BREAST:  Postoperative scarring and signal void from surgical  clips is seen in the deep upper outer breast. No suspicious mass or  nonmass enhancement is identified.      No axillary or internal mammary lymphadenopathy is appreciated.      NON-BREAST FINDINGS:  None.      IMPRESSION:  No MRI evidence of malignancy in either breast.      BI-RADS CATEGORY:  BI-RADS Category:  2 Benign.  Recommendation:  Routine Screening Breast MRI in 1 Year.  Recommended Date:  1 Year.  Laterality:  Bilateral.      For any future breast imaging appointments, please call [phone] ([phone]).          MACRO:  None      Signed by: Supriya Galvan 1/2/2024 7:57 AM  Dictation workstation:   [id number]  Established  patient annual exam 78 years old.  Prior hysterectomy.  Would like to get back on oral Premarin.  She has had a previous benign breast biopsy.  Recent MRI was normal.  Past couple years she has had a right ankle fracture that needed revision    Exam breast abdominal pelvic exams normal    MRI reviewed.   estradiol 1 mg oral daily called in.  She states she always felt much better on oral estrogen.  We reviewed data from the women's health initiative.  She is working for [employer] for many years now          Review of Systems   All other systems reviewed and are negative.      Objective   Physical Exam  Constitutional:       Appearance: Normal appearance. She is obese.   Chest:   Breasts:     Right: Normal.      Left: Normal.   Genitourinary:     General: Normal vulva.      Adnexa: Right adnexa normal and left adnexa normal.   Neurological:      Mental Status: She is alert.         Assessment/Plan   Medicare well woman exam,     Resume oral estrogen.  Called in both Premarin and estradiol.  She will determine which 1 is cost effective    Yasir Blanco MD 02/20/24 3:22 PM

## 2024-02-21 RX ORDER — ESTRADIOL 1 MG/1
1 TABLET ORAL DAILY
Qty: 90 TABLET | Refills: 3 | Status: SHIPPED | OUTPATIENT
Start: 2024-02-21 | End: 2024-04-05 | Stop reason: ALTCHOICE

## 2024-04-03 ENCOUNTER — TELEPHONE (OUTPATIENT)
Dept: PRIMARY CARE | Facility: CLINIC | Age: 79
End: 2024-04-03
Payer: COMMERCIAL

## 2024-04-03 DIAGNOSIS — I10 ESSENTIAL HYPERTENSION: ICD-10-CM

## 2024-04-03 DIAGNOSIS — E55.9 VITAMIN D DEFICIENCY: ICD-10-CM

## 2024-04-03 DIAGNOSIS — Z01.89 ENCOUNTER FOR ROUTINE LABORATORY TESTING: Primary | ICD-10-CM

## 2024-04-03 DIAGNOSIS — R73.03 PREDIABETES: ICD-10-CM

## 2024-04-03 DIAGNOSIS — E78.2 MIXED HYPERLIPIDEMIA: ICD-10-CM

## 2024-04-05 ENCOUNTER — LAB (OUTPATIENT)
Dept: LAB | Facility: LAB | Age: 79
End: 2024-04-05
Payer: COMMERCIAL

## 2024-04-05 ENCOUNTER — OFFICE VISIT (OUTPATIENT)
Dept: PRIMARY CARE | Facility: CLINIC | Age: 79
End: 2024-04-05
Payer: COMMERCIAL

## 2024-04-05 VITALS
TEMPERATURE: 98 F | WEIGHT: 203 LBS | HEART RATE: 94 BPM | SYSTOLIC BLOOD PRESSURE: 138 MMHG | DIASTOLIC BLOOD PRESSURE: 80 MMHG | OXYGEN SATURATION: 96 % | BODY MASS INDEX: 38.36 KG/M2

## 2024-04-05 DIAGNOSIS — E55.9 VITAMIN D DEFICIENCY: ICD-10-CM

## 2024-04-05 DIAGNOSIS — Z79.899 POLYPHARMACY: ICD-10-CM

## 2024-04-05 DIAGNOSIS — I10 ESSENTIAL HYPERTENSION: ICD-10-CM

## 2024-04-05 DIAGNOSIS — K75.81 STEATOHEPATITIS: ICD-10-CM

## 2024-04-05 DIAGNOSIS — F41.9 ANXIETY: ICD-10-CM

## 2024-04-05 DIAGNOSIS — R73.03 PREDIABETES: ICD-10-CM

## 2024-04-05 DIAGNOSIS — Z01.89 ENCOUNTER FOR ROUTINE LABORATORY TESTING: ICD-10-CM

## 2024-04-05 DIAGNOSIS — E66.01 CLASS 2 SEVERE OBESITY WITH SERIOUS COMORBIDITY AND BODY MASS INDEX (BMI) OF 38.0 TO 38.9 IN ADULT, UNSPECIFIED OBESITY TYPE (MULTI): Primary | ICD-10-CM

## 2024-04-05 DIAGNOSIS — E78.2 MIXED HYPERLIPIDEMIA: ICD-10-CM

## 2024-04-05 DIAGNOSIS — K21.9 GASTROESOPHAGEAL REFLUX DISEASE WITHOUT ESOPHAGITIS: ICD-10-CM

## 2024-04-05 DIAGNOSIS — I85.00 ESOPHAGEAL VARICES WITHOUT BLEEDING, UNSPECIFIED ESOPHAGEAL VARICES TYPE (MULTI): ICD-10-CM

## 2024-04-05 DIAGNOSIS — F32.A DEPRESSION, UNSPECIFIED DEPRESSION TYPE: ICD-10-CM

## 2024-04-05 PROBLEM — J01.90 ACUTE SUPPURATIVE SINUSITIS: Status: RESOLVED | Noted: 2023-08-21 | Resolved: 2024-04-05

## 2024-04-05 PROBLEM — R30.0 DYSURIA: Status: RESOLVED | Noted: 2023-08-21 | Resolved: 2024-04-05

## 2024-04-05 PROBLEM — R39.9 LOWER URINARY TRACT SYMPTOMS (LUTS): Status: RESOLVED | Noted: 2023-08-21 | Resolved: 2024-04-05

## 2024-04-05 PROBLEM — Z98.890 STATUS POST EXCISIONAL BIOPSY: Status: RESOLVED | Noted: 2023-08-21 | Resolved: 2024-04-05

## 2024-04-05 PROBLEM — R82.998 URINE LEUKOCYTES: Status: RESOLVED | Noted: 2023-08-21 | Resolved: 2024-04-05

## 2024-04-05 PROBLEM — R31.9 HEMATURIA: Status: RESOLVED | Noted: 2023-08-21 | Resolved: 2024-04-05

## 2024-04-05 PROBLEM — R06.00 DYSPNEA: Status: RESOLVED | Noted: 2023-10-06 | Resolved: 2024-04-05

## 2024-04-05 LAB
25(OH)D3 SERPL-MCNC: 71 NG/ML (ref 30–100)
ALBUMIN SERPL BCP-MCNC: 3.9 G/DL (ref 3.4–5)
ALP SERPL-CCNC: 73 U/L (ref 33–136)
ALT SERPL W P-5'-P-CCNC: 19 U/L (ref 7–45)
ANION GAP SERPL CALC-SCNC: 12 MMOL/L (ref 10–20)
AST SERPL W P-5'-P-CCNC: 16 U/L (ref 9–39)
BASOPHILS # BLD AUTO: 0.06 X10*3/UL (ref 0–0.1)
BASOPHILS NFR BLD AUTO: 0.5 %
BILIRUB DIRECT SERPL-MCNC: 0.1 MG/DL (ref 0–0.3)
BILIRUB SERPL-MCNC: 0.7 MG/DL (ref 0–1.2)
BUN SERPL-MCNC: 12 MG/DL (ref 6–23)
CALCIUM SERPL-MCNC: 8.7 MG/DL (ref 8.6–10.6)
CHLORIDE SERPL-SCNC: 101 MMOL/L (ref 98–107)
CHOLEST SERPL-MCNC: 168 MG/DL (ref 0–199)
CHOLESTEROL/HDL RATIO: 4.7
CO2 SERPL-SCNC: 32 MMOL/L (ref 21–32)
CREAT SERPL-MCNC: 0.69 MG/DL (ref 0.5–1.05)
EGFRCR SERPLBLD CKD-EPI 2021: 90 ML/MIN/1.73M*2
EOSINOPHIL # BLD AUTO: 0.23 X10*3/UL (ref 0–0.4)
EOSINOPHIL NFR BLD AUTO: 2 %
ERYTHROCYTE [DISTWIDTH] IN BLOOD BY AUTOMATED COUNT: 13.3 % (ref 11.5–14.5)
EST. AVERAGE GLUCOSE BLD GHB EST-MCNC: 128 MG/DL
GLUCOSE SERPL-MCNC: 126 MG/DL (ref 74–99)
HBA1C MFR BLD: 6.1 %
HCT VFR BLD AUTO: 46.4 % (ref 36–46)
HDLC SERPL-MCNC: 36.1 MG/DL
HGB BLD-MCNC: 15.4 G/DL (ref 12–16)
IMM GRANULOCYTES # BLD AUTO: 0.07 X10*3/UL (ref 0–0.5)
IMM GRANULOCYTES NFR BLD AUTO: 0.6 % (ref 0–0.9)
LDLC SERPL CALC-MCNC: 95 MG/DL
LYMPHOCYTES # BLD AUTO: 3.32 X10*3/UL (ref 0.8–3)
LYMPHOCYTES NFR BLD AUTO: 28.2 %
MCH RBC QN AUTO: 29.5 PG (ref 26–34)
MCHC RBC AUTO-ENTMCNC: 33.2 G/DL (ref 32–36)
MCV RBC AUTO: 89 FL (ref 80–100)
MONOCYTES # BLD AUTO: 0.76 X10*3/UL (ref 0.05–0.8)
MONOCYTES NFR BLD AUTO: 6.4 %
NEUTROPHILS # BLD AUTO: 7.35 X10*3/UL (ref 1.6–5.5)
NEUTROPHILS NFR BLD AUTO: 62.3 %
NON HDL CHOLESTEROL: 132 MG/DL (ref 0–149)
NRBC BLD-RTO: 0 /100 WBCS (ref 0–0)
PLATELET # BLD AUTO: 297 X10*3/UL (ref 150–450)
POTASSIUM SERPL-SCNC: 3.7 MMOL/L (ref 3.5–5.3)
PROT SERPL-MCNC: 6.6 G/DL (ref 6.4–8.2)
RBC # BLD AUTO: 5.22 X10*6/UL (ref 4–5.2)
SODIUM SERPL-SCNC: 141 MMOL/L (ref 136–145)
TRIGL SERPL-MCNC: 187 MG/DL (ref 0–149)
TSH SERPL-ACNC: 3.62 MIU/L (ref 0.44–3.98)
VLDL: 37 MG/DL (ref 0–40)
WBC # BLD AUTO: 11.8 X10*3/UL (ref 4.4–11.3)

## 2024-04-05 PROCEDURE — 99214 OFFICE O/P EST MOD 30 MIN: CPT | Performed by: STUDENT IN AN ORGANIZED HEALTH CARE EDUCATION/TRAINING PROGRAM

## 2024-04-05 PROCEDURE — 83036 HEMOGLOBIN GLYCOSYLATED A1C: CPT

## 2024-04-05 PROCEDURE — 1160F RVW MEDS BY RX/DR IN RCRD: CPT | Performed by: STUDENT IN AN ORGANIZED HEALTH CARE EDUCATION/TRAINING PROGRAM

## 2024-04-05 PROCEDURE — 85025 COMPLETE CBC W/AUTO DIFF WBC: CPT

## 2024-04-05 PROCEDURE — 36415 COLL VENOUS BLD VENIPUNCTURE: CPT

## 2024-04-05 PROCEDURE — 80053 COMPREHEN METABOLIC PANEL: CPT

## 2024-04-05 PROCEDURE — 82306 VITAMIN D 25 HYDROXY: CPT

## 2024-04-05 PROCEDURE — 1159F MED LIST DOCD IN RCRD: CPT | Performed by: STUDENT IN AN ORGANIZED HEALTH CARE EDUCATION/TRAINING PROGRAM

## 2024-04-05 PROCEDURE — 84443 ASSAY THYROID STIM HORMONE: CPT

## 2024-04-05 PROCEDURE — 3075F SYST BP GE 130 - 139MM HG: CPT | Performed by: STUDENT IN AN ORGANIZED HEALTH CARE EDUCATION/TRAINING PROGRAM

## 2024-04-05 PROCEDURE — 82248 BILIRUBIN DIRECT: CPT

## 2024-04-05 PROCEDURE — 1126F AMNT PAIN NOTED NONE PRSNT: CPT | Performed by: STUDENT IN AN ORGANIZED HEALTH CARE EDUCATION/TRAINING PROGRAM

## 2024-04-05 PROCEDURE — 1036F TOBACCO NON-USER: CPT | Performed by: STUDENT IN AN ORGANIZED HEALTH CARE EDUCATION/TRAINING PROGRAM

## 2024-04-05 PROCEDURE — 3079F DIAST BP 80-89 MM HG: CPT | Performed by: STUDENT IN AN ORGANIZED HEALTH CARE EDUCATION/TRAINING PROGRAM

## 2024-04-05 PROCEDURE — 80061 LIPID PANEL: CPT

## 2024-04-05 PROCEDURE — G2211 COMPLEX E/M VISIT ADD ON: HCPCS | Performed by: STUDENT IN AN ORGANIZED HEALTH CARE EDUCATION/TRAINING PROGRAM

## 2024-04-05 ASSESSMENT — ENCOUNTER SYMPTOMS
RESPIRATORY NEGATIVE: 1
CONSTITUTIONAL NEGATIVE: 1
GASTROINTESTINAL NEGATIVE: 1
CARDIOVASCULAR NEGATIVE: 1

## 2024-04-05 ASSESSMENT — PAIN SCALES - GENERAL: PAINLEVEL: 0-NO PAIN

## 2024-04-05 NOTE — PROGRESS NOTES
[facility]: [facility]  PROGRESS NOTE      Jaclyn Potts is a 78 y.o. female that is presenting today for Follow-up (6 month ).    Assessment/Plan   Diagnoses and all orders for this visit:  Class 2 severe obesity with serious comorbidity and body mass index (BMI) of 38.0 to 38.9 in adult, unspecified obesity type (CMS/HCC)  Esophageal varices without bleeding, unspecified esophageal varices type (CMS/HCC)  Gastroesophageal reflux disease without esophagitis  Depression, unspecified depression type  -     Follow Up In Primary Care; Future  Essential hypertension  Mixed hyperlipidemia  Vitamin D deficiency  Steatohepatitis  Prediabetes  Anxiety  -     Follow Up In Primary Care; Future  Polypharmacy  -     Opiate/Opioid/Benzo Prescription Compliance; Future  -     Follow Up In Primary Care; Future    - Significant medication and problem list reconciliation done today.  - Discussed with the patient the new hospital policy regarding controlled substances (in this case, benzodiazepines and other: ambien ). Discussed with the patient that they would need to sign agreement(s) as well as what these agreement(s) would entail (including routine three month appointments as well as minimum of once/year urine drug screening. Patient agreeable to signing the agreement(s) per our discussion. Agreement(s) signed electronically, and the patient will be provided a paper copy as well as have a copy of the agreement(s) available on [employer]. Discussed with the patient the adverse reactions associated with these medications. Patient voiced understanding. PDMP reviewed and appropriate.  Patient agreeable ONLY to signing this for the ambien, but not the xanax. Will remove the xanax from the patient's medication list at this time.  - Encouraged continued diet and exercise modification.  - Patient's vitals look good. Do not need to make changes at this time.  - Patient's mental health is good. Do not need to  make changes at this time.  - Patient had labwork done for this appointment. Discussed today.   - Persistent prediabetes. Does not require treatment at this time.  - Cholesterol is stable, mildly improved. Will not make changes at this time.  - Remainder of labwork unremarkable.   - Will order labwork for the patient's next appointment.    Subjective   - The patient otherwise feels well and denies any acute symptoms or concerns at this time.  - The patient denies any changes or progression of their chronic medical problems.  - The patient denies any problems or concerns with their medications.      Review of Systems   Constitutional: Negative.    Respiratory: Negative.     Cardiovascular: Negative.    Gastrointestinal: Negative.    All other systems reviewed and are negative.     Objective   Vitals:    04/05/24 1518   BP: 138/80   Pulse: 94   Temp: 36.7 °C (98 °F)   SpO2: 96%      Body mass index is 38.36 kg/m².  Physical Exam  Vitals and nursing note reviewed.   Constitutional:       General: She is not in acute distress.  Neck:      Vascular: No carotid bruit.   Cardiovascular:      Rate and Rhythm: Normal rate and regular rhythm.      Heart sounds: Normal heart sounds.   Pulmonary:      Effort: Pulmonary effort is normal.      Breath sounds: Normal breath sounds.   Musculoskeletal:         General: No swelling.   Neurological:      Mental Status: She is alert. Mental status is at baseline.   Psychiatric:         Mood and Affect: Mood normal.       Diagnostic Results   Lab Results   Component Value Date    GLUCOSE 126 (H) 04/05/2024    CALCIUM 8.7 04/05/2024     04/05/2024    K 3.7 04/05/2024    CO2 32 04/05/2024     04/05/2024    BUN 12 04/05/2024    CREATININE 0.69 04/05/2024     Lab Results   Component Value Date    ALT 19 04/05/2024    AST 16 04/05/2024    ALKPHOS 73 04/05/2024    BILITOT 0.7 04/05/2024     Lab Results   Component Value Date    WBC 11.8 (H) 04/05/2024    HGB 15.4 04/05/2024    HCT  "46.4 (H) 04/05/2024    MCV 89 04/05/2024     04/05/2024     Lab Results   Component Value Date    CHOL 168 04/05/2024    CHOL 187 10/03/2023    CHOL 187 07/26/2022     Lab Results   Component Value Date    HDL 36.1 04/05/2024    HDL 41.9 10/03/2023    HDL 40.7 07/26/2022     Lab Results   Component Value Date    LDLCALC 95 04/05/2024    LDLCALC 105 (L) 10/03/2023     Lab Results   Component Value Date    TRIG 187 (H) 04/05/2024    TRIG 201 (H) 10/03/2023    TRIG 192 (H) 07/26/2022     No components found for: \"CHOLHDL\"  Lab Results   Component Value Date    HGBA1C 6.1 (H) 04/05/2024     Other labs not included in the list above were reviewed either before or during this encounter.    History    Past Medical History:   Diagnosis Date    Other abnormal and inconclusive findings on diagnostic imaging of breast 12/09/2020    Abnormal mammogram of left breast    Other specified disorders of breast 12/09/2020    Seroma of breast    Personal history of other diseases of the circulatory system 12/09/2020    History of hypertension    Personal history of other diseases of the respiratory system 11/26/2019    History of acute bacterial sinusitis    Squamous cell carcinoma of skin of right lower limb, including hip 12/09/2020    Squamous cell carcinoma of skin of right lower extremity     Past Surgical History:   Procedure Laterality Date    HERNIA REPAIR  11/30/2020    Hernia Repair    OTHER SURGICAL HISTORY  10/19/2020    Biopsy Skin    ROTATOR CUFF REPAIR  11/30/2020    Rotator Cuff Repair    TOTAL ABDOMINAL HYSTERECTOMY  12/09/2020    Total Abdominal Hysterectomy    UMBILICAL HERNIA REPAIR  12/09/2020    Umbilical Hernia Repair     Family History   Problem Relation Name Age of Onset    Stroke Mother      Diabetes Mother      Alzheimer's disease Father      Lung cancer Sister      No Known Problems Sister      Asthma Brother      Cancer Brother      No Known Problems Brother      Breast cancer Daughter  40    Cancer " Daughter      No Known Problems Daughter       Social History     Socioeconomic History    Marital status:      Spouse name: Not on file    Number of children: Not on file    Years of education: Not on file    Highest education level: Not on file   Occupational History    Not on file   Tobacco Use    Smoking status: Former     Types: Cigarettes     Quit date:      Years since quittin.2    Smokeless tobacco: Never   Substance and Sexual Activity    Alcohol use: Yes     Comment: rarely    Drug use: Never    Sexual activity: Not on file   Other Topics Concern    Not on file   Social History Narrative    Not on file     Social Determinants of Health     Financial Resource Strain: Not on file   Food Insecurity: Not on file   Transportation Needs: Not on file   Physical Activity: Not on file   Stress: Not on file   Social Connections: Not on file   Intimate Partner Violence: Not on file   Housing Stability: Not on file     Allergies   Allergen Reactions    Amoxicillin-Pot Clavulanate Itching     vomiting    Sulfa (Sulfonamide Antibiotics) Unknown and Other    Hydrocodone-Acetaminophen Rash and Itching     Current Outpatient Medications on File Prior to Visit   Medication Sig Dispense Refill    albuterol (Proventil HFA) 90 mcg/actuation inhaler Inhale 2 puffs 4 times a day as needed.      albuterol 0.63 mg/3 mL nebulizer solution Take by nebulization.      calcium carb,gluc/mag ox,gluc (CALCIUM MAGNESIUM ORAL) 1 tablet once daily.      doxycycline (Adoxa) 100 mg tablet Take 1 tablet (100 mg) by mouth if needed.      ergocalciferol (Vitamin D-2) 1.25 MG (82591 UT) capsule TAKE 1 CAPSULE BY MOUTH ONE TIME PER WEEK FOR 90 DAYS 12 capsule 3    estradiol (Estrace) 1 mg tablet Take 1 tablet (1 mg) by mouth once daily. 90 tablet 3    gabapentin (Neurontin) 100 mg capsule Take 1 capsule (100 mg) by mouth 3 times a day. 270 capsule 3    hydroCHLOROthiazide (HYDRODiuril) 25 mg tablet TAKE 1 TABLET BY MOUTH EVERY DAY  FOR 90 DAYS 90 tablet 3    ipratropium-albuteroL (Duo-Neb) 0.5-2.5 mg/3 mL nebulizer solution Take 3 mL by nebulization every 6 hours if needed for wheezing or shortness of breath. 180 mL     lidocaine (Xylocaine) 2 % solution Take by mouth.      meloxicam (Mobic) 15 mg tablet Take 1 tablet (15 mg) by mouth once daily.      multivitamin tablet Take by mouth.      potassium chloride CR 20 mEq ER tablet TAKE 1 TABLET BY MOUTH EVERY DAY WITH FOOD FOR 90 DAYS 90 tablet 3    tiZANidine (Zanaflex) 4 mg tablet Take 1 tablet (4 mg) by mouth every 8 hours if needed for muscle spasms. 60 tablet 1    triamcinolone (Kenalog) 0.1 % cream Triamcinolone Acetonide 0.1 % External Cream   Quantity: 30  Refills: 0        Start : 9-Jun-2021   Active      zolpidem (Ambien) 10 mg tablet TAKE 1 TABLET BY MOUTH EVERY DAY AT BEDTIME AS NEEDED FOR 90 DAYS 90 tablet 2    [DISCONTINUED] ALPRAZolam (Xanax) 0.5 mg tablet Take 1 tablet (0.5 mg) by mouth 2 times a day as needed for anxiety. 7 tablet     [DISCONTINUED] potassium chloride CR (Klor-Con M20) 20 mEq ER tablet Take 1 tablet (20 mEq) by mouth once daily. With food      [DISCONTINUED] estradiol (Estrace) 1 mg tablet Take 1 tablet (1 mg) by mouth once daily. 90 tablet 3     No current facility-administered medications on file prior to visit.     Immunization History   Administered Date(s) Administered    Influenza, High Dose Seasonal, Preservative Free 10/29/2016, 09/08/2018, 08/23/2019    Influenza, Seasonal, Quadrivalent, Adjuvanted 10/16/2020, 10/21/2021, 10/15/2022, 10/06/2023    Influenza, injectable, quadrivalent 09/08/2018    Influenza, seasonal, injectable 11/30/2010, 10/01/2015    Pfizer COVID-19 vaccine, Fall 2023, 12 years and older, (30mcg/0.3mL) 10/06/2023    Pfizer COVID-19 vaccine, bivalent, age 12 years and older (30 mcg/0.3 mL) 10/15/2022    Pfizer Gray Cap SARS-CoV-2 04/30/2022    Pfizer Purple Cap SARS-CoV-2 02/27/2021, 03/27/2021, 10/21/2021    Pneumococcal conjugate  vaccine, 13-valent (PREVNAR 13) 10/27/2015, 11/01/2017, 12/01/2017    Pneumococcal polysaccharide vaccine, 23-valent, age 2 years and older (PNEUMOVAX 23) 09/10/2018    RSV, 60 Years And Older (AREXVY) 12/02/2023    Zoster vaccine, recombinant, adult (SHINGRIX) 03/26/2019, 08/24/2019    Zoster, live 10/06/2017     Patient's medical history was reviewed and updated either before or during this encounter.       Gilberto Corrales MD

## 2024-04-17 ENCOUNTER — OFFICE VISIT (OUTPATIENT)
Dept: DERMATOLOGY | Facility: CLINIC | Age: 79
End: 2024-04-17
Payer: COMMERCIAL

## 2024-04-17 DIAGNOSIS — L90.5 SCAR CONDITIONS AND FIBROSIS OF SKIN: ICD-10-CM

## 2024-04-17 DIAGNOSIS — L57.8 SUN-DAMAGED SKIN: ICD-10-CM

## 2024-04-17 DIAGNOSIS — D22.9 MULTIPLE BENIGN MELANOCYTIC NEVI: ICD-10-CM

## 2024-04-17 DIAGNOSIS — D18.01 CHERRY ANGIOMA: ICD-10-CM

## 2024-04-17 DIAGNOSIS — L82.1 SEBORRHEIC KERATOSES: ICD-10-CM

## 2024-04-17 DIAGNOSIS — L57.0 ACTINIC KERATOSIS: Primary | ICD-10-CM

## 2024-04-17 PROBLEM — C44.92 SQUAMOUS CELL CARCINOMA OF SKIN: Status: ACTIVE | Noted: 2024-04-17

## 2024-04-17 PROCEDURE — 1159F MED LIST DOCD IN RCRD: CPT | Performed by: DERMATOLOGY

## 2024-04-17 PROCEDURE — 1160F RVW MEDS BY RX/DR IN RCRD: CPT | Performed by: DERMATOLOGY

## 2024-04-17 PROCEDURE — 99214 OFFICE O/P EST MOD 30 MIN: CPT | Performed by: DERMATOLOGY

## 2024-04-17 PROCEDURE — 1036F TOBACCO NON-USER: CPT | Performed by: DERMATOLOGY

## 2024-04-17 ASSESSMENT — DERMATOLOGY PATIENT ASSESSMENT
DO YOU HAVE ANY NEW OR CHANGING LESIONS: NO
HAVE YOU HAD OR DO YOU HAVE A STAPH INFECTION: NO
DO YOU USE A TANNING BED: YES, PREVIOUSLY
ARE YOU AN ORGAN TRANSPLANT RECIPIENT: NO
ARE YOU TRYING TO GET PREGNANT: NO
DO YOU HAVE IRREGULAR MENSTRUAL CYCLES: NO
ARE YOU ON BIRTH CONTROL: NO
DO YOU USE SUNSCREEN: OCCASIONALLY
HAVE YOU HAD OR DO YOU HAVE VASCULAR DISEASE: NO

## 2024-04-17 ASSESSMENT — DERMATOLOGY QUALITY OF LIFE (QOL) ASSESSMENT
RATE HOW BOTHERED YOU ARE BY EFFECTS OF YOUR SKIN PROBLEMS ON YOUR ACTIVITIES (EG, GOING OUT, ACCOMPLISHING WHAT YOU WANT, WORK ACTIVITIES OR YOUR RELATIONSHIPS WITH OTHERS): 0 - NEVER BOTHERED
ARE THERE EXCLUSIONS OR EXCEPTIONS FOR THE QUALITY OF LIFE ASSESSMENT: NO
DATE THE QUALITY-OF-LIFE ASSESSMENT WAS COMPLETED: 66947
RATE HOW EMOTIONALLY BOTHERED YOU ARE BY YOUR SKIN PROBLEM (FOR EXAMPLE, WORRY, EMBARRASSMENT, FRUSTRATION): 0 - NEVER BOTHERED
RATE HOW BOTHERED YOU ARE BY SYMPTOMS OF YOUR SKIN PROBLEM (EG, ITCHING, STINGING BURNING, HURTING OR SKIN IRRITATION): 0 - NEVER BOTHERED

## 2024-04-17 ASSESSMENT — PATIENT GLOBAL ASSESSMENT (PGA): PATIENT GLOBAL ASSESSMENT: PATIENT GLOBAL ASSESSMENT:  1 - CLEAR

## 2024-04-17 ASSESSMENT — ITCH NUMERIC RATING SCALE: HOW SEVERE IS YOUR ITCHING?: 0

## 2024-04-17 NOTE — PROGRESS NOTES
Subjective     Jaclyn Potts is a 78 y.o. female who presents for the following: Skin Check. H/o Actinic keratoses, does well with courses of efudex and cryotherapy PRN.     Trip to [place] next month!    Skin Cancer History  Invasive squamous cell carcinoma     Review of Systems:  No other skin or systemic complaints other than what is documented elsewhere in the note.    The following portions of the chart were reviewed this encounter and updated as appropriate:       Specialty Problems          Dermatology Problems    Toenail fungus    Squamous cell carcinoma of skin     Dx: Squamous cell carcinoma Location Right leg 9/2015 Treatment: excision     Dx: Squamous cell carcinoma in situ Location: right shin 11/2019 Tx: curettage     Dx: Squamous cell carcinoma in situ Location: right medial calf 11/2019 Tx: curettage          Past Medical History:  Jaclyn Potts  has a past medical history of Other abnormal and inconclusive findings on diagnostic imaging of breast (12/09/2020), Other specified disorders of breast (12/09/2020), Personal history of other diseases of the circulatory system (12/09/2020), Personal history of other diseases of the respiratory system (11/26/2019), and Squamous cell carcinoma of skin of right lower limb, including hip (12/09/2020).    Past Surgical History:  Jaclyn Potts  has a past surgical history that includes Other surgical history (10/19/2020); Hernia repair (11/30/2020); Total abdominal hysterectomy (12/09/2020); Umbilical hernia repair (12/09/2020); and Rotator cuff repair (11/30/2020).    Family History:  Patient family history includes Alzheimer's disease in her father; Asthma in her brother; Breast cancer (age of onset: 40) in her daughter; Cancer in her brother and daughter; Diabetes in her mother; Lung cancer in her sister; No Known Problems in her brother, daughter, and sister; Stroke in her mother.    Social History:  Jaclyn Potts  reports that she quit  smoking about 14 years ago. Her smoking use included cigarettes. She has never used smokeless tobacco. She reports current alcohol use. She reports that she does not use drugs.    Allergies:  Amoxicillin-pot clavulanate, Sulfa (sulfonamide antibiotics), and Hydrocodone-acetaminophen    Current Medications / CAM's:    Current Outpatient Medications:     albuterol (Proventil HFA) 90 mcg/actuation inhaler, Inhale 2 puffs 4 times a day as needed., Disp: , Rfl:     albuterol 0.63 mg/3 mL nebulizer solution, Take by nebulization., Disp: , Rfl:     calcium carb,gluc/mag ox,gluc (CALCIUM MAGNESIUM ORAL), 1 tablet once daily., Disp: , Rfl:     doxycycline (Adoxa) 100 mg tablet, Take 1 tablet (100 mg) by mouth if needed., Disp: , Rfl:     ergocalciferol (Vitamin D-2) 1.25 MG (99206 UT) capsule, TAKE 1 CAPSULE BY MOUTH ONE TIME PER WEEK FOR 90 DAYS, Disp: 12 capsule, Rfl: 3    estradiol (Estrace) 1 mg tablet, Take 1 tablet (1 mg) by mouth once daily., Disp: 90 tablet, Rfl: 3    gabapentin (Neurontin) 100 mg capsule, Take 1 capsule (100 mg) by mouth 3 times a day., Disp: 270 capsule, Rfl: 3    hydroCHLOROthiazide (HYDRODiuril) 25 mg tablet, TAKE 1 TABLET BY MOUTH EVERY DAY FOR 90 DAYS, Disp: 90 tablet, Rfl: 3    ipratropium-albuteroL (Duo-Neb) 0.5-2.5 mg/3 mL nebulizer solution, Take 3 mL by nebulization every 6 hours if needed for wheezing or shortness of breath., Disp: 180 mL, Rfl:     lidocaine (Xylocaine) 2 % solution, Take by mouth., Disp: , Rfl:     meloxicam (Mobic) 15 mg tablet, Take 1 tablet (15 mg) by mouth once daily., Disp: , Rfl:     multivitamin tablet, Take by mouth., Disp: , Rfl:     potassium chloride CR 20 mEq ER tablet, TAKE 1 TABLET BY MOUTH EVERY DAY WITH FOOD FOR 90 DAYS, Disp: 90 tablet, Rfl: 3    tiZANidine (Zanaflex) 4 mg tablet, Take 1 tablet (4 mg) by mouth every 8 hours if needed for muscle spasms., Disp: 60 tablet, Rfl: 1    triamcinolone (Kenalog) 0.1 % cream, Triamcinolone Acetonide 0.1 % External  Cream  Quantity: 30  Refills: 0      Start : 9-Jun-2021  Active, Disp: , Rfl:     zolpidem (Ambien) 10 mg tablet, TAKE 1 TABLET BY MOUTH EVERY DAY AT BEDTIME AS NEEDED FOR 90 DAYS, Disp: 90 tablet, Rfl: 2     Objective   Well appearing patient in no apparent distress; mood and affect are within normal limits.    A full examination was performed including scalp, head, eyes, ears, nose, lips, neck, chest, axillae, abdomen, back, buttocks, bilateral upper extremities, bilateral lower extremities, hands, feet, fingers, toes, fingernails, and toenails. All findings within normal limits unless otherwise noted below.  - scattered tan macules, telangiectasias, and general photo-damage    - scattered regular brown macules and papules    - Scattered waxy tan/grey/brown papules with horn cysts    - scattered small bright red papules and macules    - Well healed scar at prior treatment sites without visual or palpable evidence of recurrence.     Left Lower Leg - Anterior, Mid Supratip of Nose  Gritty papule on nasal dorsum and scaly pink very thin papule on left medial calf    Crusted papules on right lower eyelid, left check at sites of recent efudex.         Assessment/Plan   Actinic keratosis (2)  Left Lower Leg - Anterior; Mid Supratip of Nose    Plans to use efudex on nose and left medial calf between visits, will use bid for 2-3 weeks for nose and bid for 3-4 weeks on spot on calf. Patient expressed understanding and that she has enough efudex at home to treat.     All spots treated with cryo at last visit in fall 2023 resolved nicely. She self treated with efudex some Actinic keratoses on her right lower eyelid and left cheek recently and they are crusted today; allow to heal and use UV protection     The benefit of this chemotherapy cream treatment is to treat pre-cancerous areas and areas with early or superficial skin cancers. Risks include allergic or sensitivity reaction, local skin changes including redness,  crusts, pain, itch, healing with discoloration, and need for repeat treatments/additional treatments. Strict sun protection is required during treatment and until healed. Rarely, scars can occur in cases of over-treatment.      Related Procedures  Follow Up In Dermatology - Established Patient    Sun-damaged skin    Actinically damaged skin-  - Sun protective behavior reviewed and encouraged including the use of over-the-counter sunscreen with SPF30+ daily (reapply every 1.5 hours when outdoors), UPF clothing, broad rimmed hats, sunglasses, and avoidance of midday sun. Home skin monitoring encouraged and how to monitor for skin cancer (changing or new moles, new rapidly growing or non-healing lesions) reviewed. Patient encouraged to call with interval concerns or changes.      - Follows with an , ok to continue    Related Procedures  Follow Up In Dermatology - Established Patient    Multiple benign melanocytic nevi    Benign melanocytic nevi  - Discussed benign nature and that no treatment is necessary unless it becomes painful or increases in size. Patient opts for clinical monitoring at this time.    - Sun protective behavior reviewed and encouraged including the use of over-the-counter sunscreen with SPF30+ daily (reapply every 1.5 hours when outdoors), UPF clothing, broad rimmed hats, sunglasses, and avoidance of midday sun. Home skin monitoring encouraged and how to monitor for skin cancer (changing or new moles, new rapidly growing or non-healing lesions) reviewed. Patient encouraged to call with interval concerns or changes.      Seborrheic keratoses    Seborrheic keratosis (-es)  - Discussed benign nature and that no treatment is necessary unless it becomes painful or increases in size. Patient opts for clinical monitoring at this time.      Cherry angioma    Cherry angioma(s)  - Discussed benign nature and that no treatment is necessary unless it becomes painful or increases in size. Patient  opts for clinical monitoring at this time.      Scar conditions and fibrosis of skin    - Well healed scar(s) at sites of prior skin cancer - Invasive squamous cell carcinoma right lower leg 2020  - Sun protective behavior reviewed and encouraged including the use of over-the-counter sunscreen with SPF30+ daily (reapply every 1.5 hours when outdoors), UPF clothing, broad rimmed hats, sunglasses, and avoidance of midday sun. Home skin monitoring encouraged and how to monitor for skin cancer (changing or new moles, new rapidly growing or non-healing lesions) reviewed. Patient encouraged to call with interval concerns or changes.          Fuv 6 mos isace  Anastasia Brandon MD

## 2024-04-30 ENCOUNTER — APPOINTMENT (OUTPATIENT)
Dept: ORTHOPEDIC SURGERY | Facility: HOSPITAL | Age: 79
End: 2024-04-30
Payer: COMMERCIAL

## 2024-05-07 ENCOUNTER — HOSPITAL ENCOUNTER (OUTPATIENT)
Dept: RADIOLOGY | Facility: HOSPITAL | Age: 79
Discharge: HOME | End: 2024-05-07
Payer: COMMERCIAL

## 2024-05-07 ENCOUNTER — OFFICE VISIT (OUTPATIENT)
Dept: ORTHOPEDIC SURGERY | Facility: HOSPITAL | Age: 79
End: 2024-05-07
Payer: COMMERCIAL

## 2024-05-07 VITALS — BODY MASS INDEX: 38.33 KG/M2 | HEIGHT: 61 IN | WEIGHT: 203 LBS

## 2024-05-07 DIAGNOSIS — M19.071 ARTHROSIS OF RIGHT ANKLE: ICD-10-CM

## 2024-05-07 DIAGNOSIS — S82.891D CLOSED FRACTURE OF RIGHT ANKLE WITH ROUTINE HEALING, SUBSEQUENT ENCOUNTER: ICD-10-CM

## 2024-05-07 DIAGNOSIS — M25.571 RIGHT ANKLE PAIN, UNSPECIFIED CHRONICITY: Primary | ICD-10-CM

## 2024-05-07 PROCEDURE — 1160F RVW MEDS BY RX/DR IN RCRD: CPT | Performed by: ORTHOPAEDIC SURGERY

## 2024-05-07 PROCEDURE — 99213 OFFICE O/P EST LOW 20 MIN: CPT | Performed by: ORTHOPAEDIC SURGERY

## 2024-05-07 PROCEDURE — 73610 X-RAY EXAM OF ANKLE: CPT | Mod: RT

## 2024-05-07 PROCEDURE — 73610 X-RAY EXAM OF ANKLE: CPT | Mod: RIGHT SIDE | Performed by: RADIOLOGY

## 2024-05-07 PROCEDURE — 1159F MED LIST DOCD IN RCRD: CPT | Performed by: ORTHOPAEDIC SURGERY

## 2024-05-07 PROCEDURE — 2500000004 HC RX 250 GENERAL PHARMACY W/ HCPCS (ALT 636 FOR OP/ED): Performed by: ORTHOPAEDIC SURGERY

## 2024-05-07 PROCEDURE — 2500000005 HC RX 250 GENERAL PHARMACY W/O HCPCS: Performed by: ORTHOPAEDIC SURGERY

## 2024-05-07 PROCEDURE — 1036F TOBACCO NON-USER: CPT | Performed by: ORTHOPAEDIC SURGERY

## 2024-05-07 PROCEDURE — 20605 DRAIN/INJ JOINT/BURSA W/O US: CPT | Mod: RT | Performed by: ORTHOPAEDIC SURGERY

## 2024-05-07 RX ORDER — LIDOCAINE HYDROCHLORIDE 10 MG/ML
4 INJECTION INFILTRATION; PERINEURAL
Status: COMPLETED | OUTPATIENT
Start: 2024-05-07 | End: 2024-05-07

## 2024-05-07 RX ORDER — TRIAMCINOLONE ACETONIDE 40 MG/ML
40 INJECTION, SUSPENSION INTRA-ARTICULAR; INTRAMUSCULAR
Status: COMPLETED | OUTPATIENT
Start: 2024-05-07 | End: 2024-05-07

## 2024-05-07 RX ADMIN — TRIAMCINOLONE ACETONIDE 40 MG: 40 INJECTION, SUSPENSION INTRA-ARTICULAR; INTRAMUSCULAR at 15:10

## 2024-05-07 RX ADMIN — LIDOCAINE HYDROCHLORIDE 4 ML: 10 INJECTION, SOLUTION INFILTRATION; PERINEURAL at 15:10

## 2024-05-07 NOTE — PROGRESS NOTES
Subjective    Patient ID: Jack Potts is a 78 y.o. female.    Chief Complaint: Injections (Pt here for Ankle Inj prior to her vacation)     Last Surgery: R distal fibula nonunion repair performed on 2/10/23. Initial ORIF of R trimalleolar ankle fracture in 7/22/22    Right Ankle       JACK presents to the office s/p R distal fibula nonunion repair performed on 2/10/23. Initial ORIF of R trimalleolar ankle fracture in 7/22/22. The patient states she is doing well. Patient reported that functionally she is doing better since the last time I saw her. She is able to ambulate independently.  We are using a cane for long distances.  Patient reported that the injection that he received the last time was effective for about 10 days.  She would like to repeat injection today.  She has some discomfort and pain. She feels like there is a tendon snapping over the lateral aspect of the distal fibula. She is also noticed swelling.  She also have pain along the ankle joint line with activities.  Patient is still doing exercises at the gym with her daughter.  She tried using compression stockings but not very effectively. She denies calf pain. Pt denies any drainage from the incision site. Pt denies any fever, chills, night sweats, chest pain, or shortness of breath. Denies any calf swelling or calf pain.   Objective   Ortho Exam  The patient is well-nourished and well-developed and in no acute distress. The patient displayed normal mood and affect. The patient's pupils were equal, round sclerae are white. Patient's respirations appear to be regular and unlabored.      Focused exam of the RLE reveals a well approximated surgical incision without any drainage, erythema, or signs of infection. The patient is tender to palpation over the distal fibula. Range of motion with eversion and inversion reproduces pain. Sensation to light touch is intact distally. Distal pulses are palpable.  gastroc TA EHL intact  able to perform  SLR/ knee ROM  calf supple and non tender to palpation  Patient does have nonpitting edema around the ankle.     Image Results:  I personally reviewed the radiographs of the R ankle today and found stable fixation and appropriate alignment, no lucency, no loss of fixation, congruent joint space. There is interval callus formation of the fracture line no longer visible.  Minimal joint arthrosis     Assessment/Plan   Encounter Diagnoses:  Closed fracture of right ankle with routine healing, subsequent encounter  Patient is doing relatively well.  Her pain has improved however she has residual stiffness and pain around the ankle.  The pain is over the hardware likely irritation of the peroneal tendons.  She also had very mild arthrosis of the joint.  Patient did respond to her previous intra-articular corticosteroid injection.  She would like repeat injection today.  Patient will come back and see me for repeat injection in 3 months as needed.  Will obtain x-ray of the right ankle at that time.  I told her that she wants to have the hardware removed would like to get a CT scan to confirm healing of prior to removing the hardware.  Patient ID: Jaclyn Potts is a 78 y.o. female.    M Inj/Asp: R ankle on 5/7/2024 3:10 PM  Indications: pain  Details: 20 G needle, anteromedial approach  Medications: 40 mg triamcinolone acetonide 40 mg/mL; 4 mL lidocaine 10 mg/mL (1 %)  Procedure, treatment alternatives, risks and benefits explained, specific risks discussed. Consent was given by the patient. Immediately prior to procedure a time out was called to verify the correct patient, procedure, equipment, support staff and site/side marked as required. Patient was prepped and draped in the usual sterile fashion.           Orders Placed This Encounter    XR ankle right 3+ views     No follow-ups on file.

## 2024-05-14 ENCOUNTER — TELEPHONE (OUTPATIENT)
Dept: ORTHOPEDIC SURGERY | Facility: HOSPITAL | Age: 79
End: 2024-05-14
Payer: COMMERCIAL

## 2024-05-14 NOTE — TELEPHONE ENCOUNTER
Received call from patient, Jaclyn Kayshaylee Anaya is currently in [facility] on vacation with her daughter  Patient of Dr. Bradley for a previous ankle fracture  She fell while crossing the street and went to a hospital in [place] diagnosed with a humerus fracture.   Per patient her humerus is in 3 parts, 2 breaks.   Jaclyn will be in from [place] on Thursday night, asked that she come in to the ED Friday morning, Dr. Bradley is on call.   She will call me, Nuzhat Webb LPN when she gets to the ED so I can notify Dr. Bradley and Anum Webb LPN

## 2024-05-17 ENCOUNTER — ANESTHESIA EVENT (OUTPATIENT)
Dept: OPERATING ROOM | Facility: HOSPITAL | Age: 79
End: 2024-05-17
Payer: COMMERCIAL

## 2024-05-17 ENCOUNTER — CLINICAL SUPPORT (OUTPATIENT)
Dept: EMERGENCY MEDICINE | Facility: HOSPITAL | Age: 79
End: 2024-05-17
Payer: COMMERCIAL

## 2024-05-17 ENCOUNTER — APPOINTMENT (OUTPATIENT)
Dept: RADIOLOGY | Facility: HOSPITAL | Age: 79
End: 2024-05-17
Payer: COMMERCIAL

## 2024-05-17 ENCOUNTER — ANESTHESIA (OUTPATIENT)
Dept: OPERATING ROOM | Facility: HOSPITAL | Age: 79
End: 2024-05-17
Payer: COMMERCIAL

## 2024-05-17 ENCOUNTER — HOSPITAL ENCOUNTER (OUTPATIENT)
Facility: HOSPITAL | Age: 79
Setting detail: OBSERVATION
Discharge: HOME | End: 2024-05-17
Attending: EMERGENCY MEDICINE
Payer: COMMERCIAL

## 2024-05-17 VITALS
HEIGHT: 61 IN | DIASTOLIC BLOOD PRESSURE: 65 MMHG | WEIGHT: 205.03 LBS | BODY MASS INDEX: 38.71 KG/M2 | SYSTOLIC BLOOD PRESSURE: 143 MMHG | OXYGEN SATURATION: 92 % | RESPIRATION RATE: 18 BRPM | TEMPERATURE: 97.5 F | HEART RATE: 100 BPM

## 2024-05-17 DIAGNOSIS — G89.18 ACUTE POSTOPERATIVE PAIN: ICD-10-CM

## 2024-05-17 DIAGNOSIS — S22.31XA CLOSED FRACTURE OF ONE RIB OF RIGHT SIDE, INITIAL ENCOUNTER: ICD-10-CM

## 2024-05-17 DIAGNOSIS — S42.221A 2-PART DISPLACED FRACTURE OF SURGICAL NECK OF RIGHT HUMERUS, INITIAL ENCOUNTER FOR CLOSED FRACTURE: ICD-10-CM

## 2024-05-17 DIAGNOSIS — S42.291A HUMERAL HEAD FRACTURE, RIGHT, CLOSED, INITIAL ENCOUNTER: Primary | ICD-10-CM

## 2024-05-17 PROBLEM — D64.9 ANEMIA: Status: ACTIVE | Noted: 2024-05-17

## 2024-05-17 LAB
ABO GROUP (TYPE) IN BLOOD: NORMAL
ALBUMIN SERPL BCP-MCNC: 3.6 G/DL (ref 3.4–5)
ALP SERPL-CCNC: 68 U/L (ref 33–136)
ALT SERPL W P-5'-P-CCNC: 29 U/L (ref 7–45)
ANION GAP SERPL CALC-SCNC: 14 MMOL/L (ref 10–20)
ANTIBODY SCREEN: NORMAL
APTT PPP: 31 SECONDS (ref 27–38)
AST SERPL W P-5'-P-CCNC: 21 U/L (ref 9–39)
ATRIAL RATE: 82 BPM
BASOPHILS # BLD AUTO: 0.07 X10*3/UL (ref 0–0.1)
BASOPHILS NFR BLD AUTO: 0.5 %
BILIRUB SERPL-MCNC: 1 MG/DL (ref 0–1.2)
BUN SERPL-MCNC: 11 MG/DL (ref 6–23)
CALCIUM SERPL-MCNC: 8.4 MG/DL (ref 8.6–10.6)
CHLORIDE SERPL-SCNC: 100 MMOL/L (ref 98–107)
CO2 SERPL-SCNC: 27 MMOL/L (ref 21–32)
CREAT SERPL-MCNC: 0.59 MG/DL (ref 0.5–1.05)
EGFRCR SERPLBLD CKD-EPI 2021: >90 ML/MIN/1.73M*2
EOSINOPHIL # BLD AUTO: 0.17 X10*3/UL (ref 0–0.4)
EOSINOPHIL NFR BLD AUTO: 1.2 %
ERYTHROCYTE [DISTWIDTH] IN BLOOD BY AUTOMATED COUNT: 13.5 % (ref 11.5–14.5)
GLUCOSE BLD MANUAL STRIP-MCNC: 118 MG/DL (ref 74–99)
GLUCOSE BLD MANUAL STRIP-MCNC: 155 MG/DL (ref 74–99)
GLUCOSE SERPL-MCNC: 124 MG/DL (ref 74–99)
HCT VFR BLD AUTO: 41.6 % (ref 36–46)
HGB BLD-MCNC: 14.3 G/DL (ref 12–16)
IMM GRANULOCYTES # BLD AUTO: 0.11 X10*3/UL (ref 0–0.5)
IMM GRANULOCYTES NFR BLD AUTO: 0.8 % (ref 0–0.9)
INR PPP: 1.1 (ref 0.9–1.1)
LYMPHOCYTES # BLD AUTO: 2.47 X10*3/UL (ref 0.8–3)
LYMPHOCYTES NFR BLD AUTO: 17.8 %
MAGNESIUM SERPL-MCNC: 2.3 MG/DL (ref 1.6–2.4)
MCH RBC QN AUTO: 29.5 PG (ref 26–34)
MCHC RBC AUTO-ENTMCNC: 34.4 G/DL (ref 32–36)
MCV RBC AUTO: 86 FL (ref 80–100)
MONOCYTES # BLD AUTO: 0.94 X10*3/UL (ref 0.05–0.8)
MONOCYTES NFR BLD AUTO: 6.8 %
NEUTROPHILS # BLD AUTO: 10.14 X10*3/UL (ref 1.6–5.5)
NEUTROPHILS NFR BLD AUTO: 72.9 %
NRBC BLD-RTO: 0 /100 WBCS (ref 0–0)
P AXIS: 62 DEGREES
P OFFSET: 203 MS
P ONSET: 149 MS
PLATELET # BLD AUTO: 289 X10*3/UL (ref 150–450)
POTASSIUM SERPL-SCNC: 3.5 MMOL/L (ref 3.5–5.3)
PR INTERVAL: 160 MS
PROT SERPL-MCNC: 6.8 G/DL (ref 6.4–8.2)
PROTHROMBIN TIME: 11.9 SECONDS (ref 9.8–12.8)
Q ONSET: 229 MS
QRS COUNT: 13 BEATS
QRS DURATION: 86 MS
QT INTERVAL: 388 MS
QTC CALCULATION(BAZETT): 453 MS
QTC FREDERICIA: 430 MS
R AXIS: -17 DEGREES
RBC # BLD AUTO: 4.84 X10*6/UL (ref 4–5.2)
RH FACTOR (ANTIGEN D): NORMAL
SODIUM SERPL-SCNC: 137 MMOL/L (ref 136–145)
T AXIS: 82 DEGREES
T OFFSET: 423 MS
VENTRICULAR RATE: 82 BPM
WBC # BLD AUTO: 13.9 X10*3/UL (ref 4.4–11.3)

## 2024-05-17 PROCEDURE — 93005 ELECTROCARDIOGRAM TRACING: CPT

## 2024-05-17 PROCEDURE — 2780000003 HC OR 278 NO HCPCS: Performed by: ORTHOPAEDIC SURGERY

## 2024-05-17 PROCEDURE — A24515 PR OPEN FIXATN MID HUMERUS FRACTURE: Performed by: ANESTHESIOLOGY

## 2024-05-17 PROCEDURE — 7100000010 HC PHASE TWO TIME - EACH INCREMENTAL 1 MINUTE: Performed by: ORTHOPAEDIC SURGERY

## 2024-05-17 PROCEDURE — 2500000004 HC RX 250 GENERAL PHARMACY W/ HCPCS (ALT 636 FOR OP/ED)

## 2024-05-17 PROCEDURE — 85025 COMPLETE CBC W/AUTO DIFF WBC: CPT | Performed by: STUDENT IN AN ORGANIZED HEALTH CARE EDUCATION/TRAINING PROGRAM

## 2024-05-17 PROCEDURE — 73070 X-RAY EXAM OF ELBOW: CPT | Mod: RT

## 2024-05-17 PROCEDURE — 71045 X-RAY EXAM CHEST 1 VIEW: CPT

## 2024-05-17 PROCEDURE — 72125 CT NECK SPINE W/O DYE: CPT

## 2024-05-17 PROCEDURE — 73060 X-RAY EXAM OF HUMERUS: CPT | Mod: RT

## 2024-05-17 PROCEDURE — 7100000009 HC PHASE TWO TIME - INITIAL BASE CHARGE: Performed by: ORTHOPAEDIC SURGERY

## 2024-05-17 PROCEDURE — 99223 1ST HOSP IP/OBS HIGH 75: CPT | Performed by: STUDENT IN AN ORGANIZED HEALTH CARE EDUCATION/TRAINING PROGRAM

## 2024-05-17 PROCEDURE — 24515 OPTX HUMRL SHFT FX PLATE/SCR: CPT | Performed by: STUDENT IN AN ORGANIZED HEALTH CARE EDUCATION/TRAINING PROGRAM

## 2024-05-17 PROCEDURE — 23615 OPTX PROX HUMRL FX W/INT FIX: CPT | Performed by: STUDENT IN AN ORGANIZED HEALTH CARE EDUCATION/TRAINING PROGRAM

## 2024-05-17 PROCEDURE — 73200 CT UPPER EXTREMITY W/O DYE: CPT | Mod: RT

## 2024-05-17 PROCEDURE — 86901 BLOOD TYPING SEROLOGIC RH(D): CPT | Performed by: STUDENT IN AN ORGANIZED HEALTH CARE EDUCATION/TRAINING PROGRAM

## 2024-05-17 PROCEDURE — 2500000005 HC RX 250 GENERAL PHARMACY W/O HCPCS: Performed by: ANESTHESIOLOGIST ASSISTANT

## 2024-05-17 PROCEDURE — 76376 3D RENDER W/INTRP POSTPROCES: CPT

## 2024-05-17 PROCEDURE — 2500000004 HC RX 250 GENERAL PHARMACY W/ HCPCS (ALT 636 FOR OP/ED): Performed by: ANESTHESIOLOGIST ASSISTANT

## 2024-05-17 PROCEDURE — 99285 EMERGENCY DEPT VISIT HI MDM: CPT | Mod: 25

## 2024-05-17 PROCEDURE — 3600000004 HC OR TIME - INITIAL BASE CHARGE - PROCEDURE LEVEL FOUR: Performed by: ORTHOPAEDIC SURGERY

## 2024-05-17 PROCEDURE — 99285 EMERGENCY DEPT VISIT HI MDM: CPT | Performed by: EMERGENCY MEDICINE

## 2024-05-17 PROCEDURE — 73070 X-RAY EXAM OF ELBOW: CPT | Mod: RIGHT SIDE | Performed by: RADIOLOGY

## 2024-05-17 PROCEDURE — 7100000002 HC RECOVERY ROOM TIME - EACH INCREMENTAL 1 MINUTE: Performed by: ORTHOPAEDIC SURGERY

## 2024-05-17 PROCEDURE — 70486 CT MAXILLOFACIAL W/O DYE: CPT

## 2024-05-17 PROCEDURE — 2720000007 HC OR 272 NO HCPCS: Performed by: ORTHOPAEDIC SURGERY

## 2024-05-17 PROCEDURE — 96374 THER/PROPH/DIAG INJ IV PUSH: CPT | Mod: 59 | Performed by: EMERGENCY MEDICINE

## 2024-05-17 PROCEDURE — 76000 FLUOROSCOPY <1 HR PHYS/QHP: CPT | Mod: CCI

## 2024-05-17 PROCEDURE — 2550000001 HC RX 255 CONTRASTS: Performed by: EMERGENCY MEDICINE

## 2024-05-17 PROCEDURE — 82947 ASSAY GLUCOSE BLOOD QUANT: CPT | Mod: 59

## 2024-05-17 PROCEDURE — A6213 FOAM DRG >16<=48 SQ IN W/BDR: HCPCS | Performed by: ORTHOPAEDIC SURGERY

## 2024-05-17 PROCEDURE — 3700000002 HC GENERAL ANESTHESIA TIME - EACH INCREMENTAL 1 MINUTE: Performed by: ORTHOPAEDIC SURGERY

## 2024-05-17 PROCEDURE — 2500000004 HC RX 250 GENERAL PHARMACY W/ HCPCS (ALT 636 FOR OP/ED): Performed by: ORTHOPAEDIC SURGERY

## 2024-05-17 PROCEDURE — 74177 CT ABD & PELVIS W/CONTRAST: CPT | Mod: RCN | Performed by: RADIOLOGY

## 2024-05-17 PROCEDURE — 82947 ASSAY GLUCOSE BLOOD QUANT: CPT

## 2024-05-17 PROCEDURE — 24515 OPTX HUMRL SHFT FX PLATE/SCR: CPT | Performed by: ORTHOPAEDIC SURGERY

## 2024-05-17 PROCEDURE — A4649 SURGICAL SUPPLIES: HCPCS | Performed by: ORTHOPAEDIC SURGERY

## 2024-05-17 PROCEDURE — 71260 CT THORAX DX C+: CPT | Mod: RCN | Performed by: RADIOLOGY

## 2024-05-17 PROCEDURE — 83735 ASSAY OF MAGNESIUM: CPT | Performed by: STUDENT IN AN ORGANIZED HEALTH CARE EDUCATION/TRAINING PROGRAM

## 2024-05-17 PROCEDURE — 84075 ASSAY ALKALINE PHOSPHATASE: CPT | Performed by: STUDENT IN AN ORGANIZED HEALTH CARE EDUCATION/TRAINING PROGRAM

## 2024-05-17 PROCEDURE — A24515 PR OPEN FIXATN MID HUMERUS FRACTURE: Performed by: ANESTHESIOLOGIST ASSISTANT

## 2024-05-17 PROCEDURE — 72131 CT LUMBAR SPINE W/O DYE: CPT | Mod: RCN | Performed by: RADIOLOGY

## 2024-05-17 PROCEDURE — 99222 1ST HOSP IP/OBS MODERATE 55: CPT | Performed by: ORTHOPAEDIC SURGERY

## 2024-05-17 PROCEDURE — A4217 STERILE WATER/SALINE, 500 ML: HCPCS | Performed by: ORTHOPAEDIC SURGERY

## 2024-05-17 PROCEDURE — 99100 ANES PT EXTEME AGE<1 YR&>70: CPT | Performed by: ANESTHESIOLOGY

## 2024-05-17 PROCEDURE — 74177 CT ABD & PELVIS W/CONTRAST: CPT

## 2024-05-17 PROCEDURE — 2500000001 HC RX 250 WO HCPCS SELF ADMINISTERED DRUGS (ALT 637 FOR MEDICARE OP): Performed by: ANESTHESIOLOGIST ASSISTANT

## 2024-05-17 PROCEDURE — 76377 3D RENDER W/INTRP POSTPROCES: CPT

## 2024-05-17 PROCEDURE — 73060 X-RAY EXAM OF HUMERUS: CPT | Mod: RIGHT SIDE | Performed by: RADIOLOGY

## 2024-05-17 PROCEDURE — 3600000009 HC OR TIME - EACH INCREMENTAL 1 MINUTE - PROCEDURE LEVEL FOUR: Performed by: ORTHOPAEDIC SURGERY

## 2024-05-17 PROCEDURE — 36415 COLL VENOUS BLD VENIPUNCTURE: CPT | Performed by: STUDENT IN AN ORGANIZED HEALTH CARE EDUCATION/TRAINING PROGRAM

## 2024-05-17 PROCEDURE — 2500000004 HC RX 250 GENERAL PHARMACY W/ HCPCS (ALT 636 FOR OP/ED): Performed by: STUDENT IN AN ORGANIZED HEALTH CARE EDUCATION/TRAINING PROGRAM

## 2024-05-17 PROCEDURE — C1713 ANCHOR/SCREW BN/BN,TIS/BN: HCPCS | Performed by: ORTHOPAEDIC SURGERY

## 2024-05-17 PROCEDURE — 85730 THROMBOPLASTIN TIME PARTIAL: CPT | Performed by: STUDENT IN AN ORGANIZED HEALTH CARE EDUCATION/TRAINING PROGRAM

## 2024-05-17 PROCEDURE — 72128 CT CHEST SPINE W/O DYE: CPT | Mod: RCN

## 2024-05-17 PROCEDURE — 23615 OPTX PROX HUMRL FX W/INT FIX: CPT | Performed by: ORTHOPAEDIC SURGERY

## 2024-05-17 PROCEDURE — 85610 PROTHROMBIN TIME: CPT | Performed by: STUDENT IN AN ORGANIZED HEALTH CARE EDUCATION/TRAINING PROGRAM

## 2024-05-17 PROCEDURE — 71045 X-RAY EXAM CHEST 1 VIEW: CPT | Mod: FOREIGN READ | Performed by: RADIOLOGY

## 2024-05-17 PROCEDURE — 73030 X-RAY EXAM OF SHOULDER: CPT | Mod: RT

## 2024-05-17 PROCEDURE — 7100000001 HC RECOVERY ROOM TIME - INITIAL BASE CHARGE: Performed by: ORTHOPAEDIC SURGERY

## 2024-05-17 PROCEDURE — 72128 CT CHEST SPINE W/O DYE: CPT | Mod: RCN | Performed by: RADIOLOGY

## 2024-05-17 PROCEDURE — 72131 CT LUMBAR SPINE W/O DYE: CPT | Mod: RCN

## 2024-05-17 PROCEDURE — C1776 JOINT DEVICE (IMPLANTABLE): HCPCS | Performed by: ORTHOPAEDIC SURGERY

## 2024-05-17 PROCEDURE — 70450 CT HEAD/BRAIN W/O DYE: CPT

## 2024-05-17 PROCEDURE — 3700000001 HC GENERAL ANESTHESIA TIME - INITIAL BASE CHARGE: Performed by: ORTHOPAEDIC SURGERY

## 2024-05-17 DEVICE — SCREW, CORT 3.5 X 26 TI: Type: IMPLANTABLE DEVICE | Site: ARM | Status: FUNCTIONAL

## 2024-05-17 DEVICE — SCREW, CORT 3.5 X 24 TI: Type: IMPLANTABLE DEVICE | Site: ARM | Status: FUNCTIONAL

## 2024-05-17 DEVICE — IMPLANTABLE DEVICE: Type: IMPLANTABLE DEVICE | Site: ARM | Status: FUNCTIONAL

## 2024-05-17 DEVICE — SCREW, CORT 3.5 X 22 TI: Type: IMPLANTABLE DEVICE | Site: ARM | Status: FUNCTIONAL

## 2024-05-17 RX ORDER — VANCOMYCIN HYDROCHLORIDE 1 G/20ML
INJECTION, POWDER, LYOPHILIZED, FOR SOLUTION INTRAVENOUS AS NEEDED
Status: DISCONTINUED | OUTPATIENT
Start: 2024-05-17 | End: 2024-05-17 | Stop reason: HOSPADM

## 2024-05-17 RX ORDER — LIDOCAINE HYDROCHLORIDE 10 MG/ML
0.1 INJECTION INFILTRATION; PERINEURAL ONCE
Status: DISCONTINUED | OUTPATIENT
Start: 2024-05-17 | End: 2024-05-17 | Stop reason: HOSPADM

## 2024-05-17 RX ORDER — POLYETHYLENE GLYCOL 3350 17 G/17G
17 POWDER, FOR SOLUTION ORAL DAILY
Status: CANCELLED | OUTPATIENT
Start: 2024-05-17

## 2024-05-17 RX ORDER — ACETAMINOPHEN 325 MG/1
650 TABLET ORAL EVERY 6 HOURS PRN
Qty: 90 TABLET | Refills: 0 | Status: SHIPPED | OUTPATIENT
Start: 2024-05-17 | End: 2024-05-29 | Stop reason: SDUPTHER

## 2024-05-17 RX ORDER — AMOXICILLIN 250 MG
2 CAPSULE ORAL 2 TIMES DAILY
Status: CANCELLED | OUTPATIENT
Start: 2024-05-17

## 2024-05-17 RX ORDER — PROPOFOL 10 MG/ML
INJECTION, EMULSION INTRAVENOUS AS NEEDED
Status: DISCONTINUED | OUTPATIENT
Start: 2024-05-17 | End: 2024-05-17

## 2024-05-17 RX ORDER — ASPIRIN 81 MG/1
81 TABLET ORAL 2 TIMES DAILY
Status: CANCELLED | OUTPATIENT
Start: 2024-05-18

## 2024-05-17 RX ORDER — ROCURONIUM BROMIDE 10 MG/ML
INJECTION, SOLUTION INTRAVENOUS AS NEEDED
Status: DISCONTINUED | OUTPATIENT
Start: 2024-05-17 | End: 2024-05-17

## 2024-05-17 RX ORDER — HYDROMORPHONE HYDROCHLORIDE 1 MG/ML
0.2 INJECTION, SOLUTION INTRAMUSCULAR; INTRAVENOUS; SUBCUTANEOUS EVERY 5 MIN PRN
Status: DISCONTINUED | OUTPATIENT
Start: 2024-05-17 | End: 2024-05-17 | Stop reason: HOSPADM

## 2024-05-17 RX ORDER — SODIUM CHLORIDE 0.9 G/100ML
IRRIGANT IRRIGATION AS NEEDED
Status: DISCONTINUED | OUTPATIENT
Start: 2024-05-17 | End: 2024-05-17 | Stop reason: HOSPADM

## 2024-05-17 RX ORDER — LIDOCAINE HYDROCHLORIDE 40 MG/ML
SOLUTION TOPICAL AS NEEDED
Status: DISCONTINUED | OUTPATIENT
Start: 2024-05-17 | End: 2024-05-17

## 2024-05-17 RX ORDER — ASPIRIN 81 MG/1
81 TABLET ORAL 2 TIMES DAILY
Qty: 56 TABLET | Refills: 0 | Status: SHIPPED | OUTPATIENT
Start: 2024-05-17 | End: 2024-06-14

## 2024-05-17 RX ORDER — NALOXONE HYDROCHLORIDE 0.4 MG/ML
0.2 INJECTION, SOLUTION INTRAMUSCULAR; INTRAVENOUS; SUBCUTANEOUS EVERY 5 MIN PRN
Status: CANCELLED | OUTPATIENT
Start: 2024-05-17

## 2024-05-17 RX ORDER — MIDAZOLAM HYDROCHLORIDE 1 MG/ML
INJECTION INTRAMUSCULAR; INTRAVENOUS AS NEEDED
Status: DISCONTINUED | OUTPATIENT
Start: 2024-05-17 | End: 2024-05-17

## 2024-05-17 RX ORDER — ONDANSETRON HYDROCHLORIDE 2 MG/ML
INJECTION, SOLUTION INTRAVENOUS AS NEEDED
Status: DISCONTINUED | OUTPATIENT
Start: 2024-05-17 | End: 2024-05-17

## 2024-05-17 RX ORDER — PHENYLEPHRINE HCL IN 0.9% NACL 0.4MG/10ML
SYRINGE (ML) INTRAVENOUS AS NEEDED
Status: DISCONTINUED | OUTPATIENT
Start: 2024-05-17 | End: 2024-05-17

## 2024-05-17 RX ORDER — SODIUM CHLORIDE, SODIUM LACTATE, POTASSIUM CHLORIDE, CALCIUM CHLORIDE 600; 310; 30; 20 MG/100ML; MG/100ML; MG/100ML; MG/100ML
100 INJECTION, SOLUTION INTRAVENOUS CONTINUOUS
Status: DISCONTINUED | OUTPATIENT
Start: 2024-05-17 | End: 2024-05-17 | Stop reason: HOSPADM

## 2024-05-17 RX ORDER — PHENYLEPHRINE 10 MG/250 ML(40 MCG/ML)IN 0.9 % SOD.CHLORIDE INTRAVENOUS
CONTINUOUS PRN
Status: DISCONTINUED | OUTPATIENT
Start: 2024-05-17 | End: 2024-05-17

## 2024-05-17 RX ORDER — LIDOCAINE HCL/PF 100 MG/5ML
SYRINGE (ML) INTRAVENOUS AS NEEDED
Status: DISCONTINUED | OUTPATIENT
Start: 2024-05-17 | End: 2024-05-17

## 2024-05-17 RX ORDER — WATER 1 ML/ML
IRRIGANT IRRIGATION AS NEEDED
Status: DISCONTINUED | OUTPATIENT
Start: 2024-05-17 | End: 2024-05-17 | Stop reason: HOSPADM

## 2024-05-17 RX ORDER — BISACODYL 5 MG
10 TABLET, DELAYED RELEASE (ENTERIC COATED) ORAL DAILY PRN
Status: CANCELLED | OUTPATIENT
Start: 2024-05-17

## 2024-05-17 RX ORDER — ONDANSETRON 4 MG/1
4 TABLET, ORALLY DISINTEGRATING ORAL EVERY 8 HOURS PRN
Status: CANCELLED | OUTPATIENT
Start: 2024-05-17

## 2024-05-17 RX ORDER — OXYCODONE HYDROCHLORIDE 5 MG/1
5 TABLET ORAL EVERY 6 HOURS PRN
Qty: 28 TABLET | Refills: 0 | Status: SHIPPED | OUTPATIENT
Start: 2024-05-17 | End: 2024-05-24

## 2024-05-17 RX ORDER — ONDANSETRON HYDROCHLORIDE 2 MG/ML
4 INJECTION, SOLUTION INTRAVENOUS EVERY 8 HOURS PRN
Status: CANCELLED | OUTPATIENT
Start: 2024-05-17

## 2024-05-17 RX ORDER — CEFAZOLIN 1 G/1
INJECTION, POWDER, FOR SOLUTION INTRAVENOUS AS NEEDED
Status: DISCONTINUED | OUTPATIENT
Start: 2024-05-17 | End: 2024-05-17

## 2024-05-17 RX ORDER — ACETAMINOPHEN 325 MG/1
650 TABLET ORAL EVERY 6 HOURS SCHEDULED
Status: CANCELLED | OUTPATIENT
Start: 2024-05-17

## 2024-05-17 RX ORDER — HYDROMORPHONE HYDROCHLORIDE 1 MG/ML
INJECTION, SOLUTION INTRAMUSCULAR; INTRAVENOUS; SUBCUTANEOUS
Status: COMPLETED
Start: 2024-05-17 | End: 2024-05-17

## 2024-05-17 RX ORDER — ROPIVACAINE HYDROCHLORIDE 5 MG/ML
INJECTION, SOLUTION EPIDURAL; INFILTRATION; PERINEURAL AS NEEDED
Status: DISCONTINUED | OUTPATIENT
Start: 2024-05-17 | End: 2024-05-17

## 2024-05-17 RX ORDER — HYDROMORPHONE HYDROCHLORIDE 1 MG/ML
0.5 INJECTION, SOLUTION INTRAMUSCULAR; INTRAVENOUS; SUBCUTANEOUS EVERY 5 MIN PRN
Status: DISCONTINUED | OUTPATIENT
Start: 2024-05-17 | End: 2024-05-17 | Stop reason: HOSPADM

## 2024-05-17 RX ORDER — OXYCODONE HYDROCHLORIDE 5 MG/1
10 TABLET ORAL EVERY 4 HOURS PRN
Status: CANCELLED | OUTPATIENT
Start: 2024-05-17

## 2024-05-17 RX ORDER — HYDROMORPHONE HYDROCHLORIDE 1 MG/ML
INJECTION, SOLUTION INTRAMUSCULAR; INTRAVENOUS; SUBCUTANEOUS AS NEEDED
Status: DISCONTINUED | OUTPATIENT
Start: 2024-05-17 | End: 2024-05-17

## 2024-05-17 RX ORDER — OXYCODONE HYDROCHLORIDE 5 MG/1
5 TABLET ORAL EVERY 6 HOURS PRN
Status: CANCELLED | OUTPATIENT
Start: 2024-05-17

## 2024-05-17 RX ORDER — METHOCARBAMOL 750 MG/1
750 TABLET, FILM COATED ORAL EVERY 8 HOURS PRN
Status: CANCELLED | OUTPATIENT
Start: 2024-05-17

## 2024-05-17 RX ORDER — FENTANYL CITRATE 50 UG/ML
INJECTION, SOLUTION INTRAMUSCULAR; INTRAVENOUS AS NEEDED
Status: DISCONTINUED | OUTPATIENT
Start: 2024-05-17 | End: 2024-05-17

## 2024-05-17 RX ORDER — CEFAZOLIN SODIUM 2 G/100ML
2 INJECTION, SOLUTION INTRAVENOUS EVERY 8 HOURS
Qty: 300 ML | Refills: 0 | Status: DISCONTINUED | OUTPATIENT
Start: 2024-05-17 | End: 2024-05-17 | Stop reason: HOSPADM

## 2024-05-17 RX ORDER — MULTIVITAMIN
1 TABLET ORAL 2 TIMES DAILY
Qty: 60 TABLET | Refills: 0 | Status: SHIPPED | OUTPATIENT
Start: 2024-05-17 | End: 2024-06-16

## 2024-05-17 RX ORDER — HYDROMORPHONE HYDROCHLORIDE 1 MG/ML
0.5 INJECTION, SOLUTION INTRAMUSCULAR; INTRAVENOUS; SUBCUTANEOUS EVERY 2 HOUR PRN
Status: CANCELLED | OUTPATIENT
Start: 2024-05-17

## 2024-05-17 RX ORDER — DOCUSATE SODIUM 100 MG/1
100 CAPSULE, LIQUID FILLED ORAL 2 TIMES DAILY PRN
Qty: 14 CAPSULE | Refills: 0 | Status: SHIPPED | OUTPATIENT
Start: 2024-05-17 | End: 2024-05-29 | Stop reason: SDUPTHER

## 2024-05-17 RX ORDER — HYDROMORPHONE HYDROCHLORIDE 1 MG/ML
0.5 INJECTION, SOLUTION INTRAMUSCULAR; INTRAVENOUS; SUBCUTANEOUS ONCE
Status: COMPLETED | OUTPATIENT
Start: 2024-05-17 | End: 2024-05-17

## 2024-05-17 RX ORDER — TRANEXAMIC ACID 100 MG/ML
INJECTION, SOLUTION INTRAVENOUS AS NEEDED
Status: DISCONTINUED | OUTPATIENT
Start: 2024-05-17 | End: 2024-05-17

## 2024-05-17 RX ORDER — METOPROLOL TARTRATE 1 MG/ML
INJECTION, SOLUTION INTRAVENOUS AS NEEDED
Status: DISCONTINUED | OUTPATIENT
Start: 2024-05-17 | End: 2024-05-17

## 2024-05-17 RX ADMIN — IOHEXOL 80 ML: 350 INJECTION, SOLUTION INTRAVENOUS at 11:45

## 2024-05-17 RX ADMIN — Medication 50 MG: at 15:15

## 2024-05-17 RX ADMIN — PROPOFOL 150 MG: 10 INJECTION, EMULSION INTRAVENOUS at 15:00

## 2024-05-17 RX ADMIN — ROCURONIUM BROMIDE 80 MG: 10 INJECTION INTRAVENOUS at 15:00

## 2024-05-17 RX ADMIN — HYDROMORPHONE HYDROCHLORIDE 0.5 MG: 1 INJECTION, SOLUTION INTRAMUSCULAR; INTRAVENOUS; SUBCUTANEOUS at 17:09

## 2024-05-17 RX ADMIN — FENTANYL CITRATE 100 MCG: 50 INJECTION, SOLUTION INTRAMUSCULAR; INTRAVENOUS at 15:00

## 2024-05-17 RX ADMIN — DEXAMETHASONE SODIUM PHOSPHATE 10 MG: 4 INJECTION INTRA-ARTICULAR; INTRALESIONAL; INTRAMUSCULAR; INTRAVENOUS; SOFT TISSUE at 15:26

## 2024-05-17 RX ADMIN — ROPIVACAINE HYDROCHLORIDE 20 ML: 5 INJECTION, SOLUTION EPIDURAL; INFILTRATION; PERINEURAL at 14:22

## 2024-05-17 RX ADMIN — Medication 80 MCG: at 15:56

## 2024-05-17 RX ADMIN — SODIUM CHLORIDE, SODIUM LACTATE, POTASSIUM CHLORIDE, AND CALCIUM CHLORIDE: 600; 310; 30; 20 INJECTION, SOLUTION INTRAVENOUS at 14:54

## 2024-05-17 RX ADMIN — HYDROMORPHONE HYDROCHLORIDE 0.5 MG: 1 INJECTION, SOLUTION INTRAMUSCULAR; INTRAVENOUS; SUBCUTANEOUS at 10:10

## 2024-05-17 RX ADMIN — Medication 80 MCG: at 15:32

## 2024-05-17 RX ADMIN — ROCURONIUM BROMIDE 20 MG: 10 INJECTION INTRAVENOUS at 16:32

## 2024-05-17 RX ADMIN — LIDOCAINE HYDROCHLORIDE 100 MG: 20 INJECTION INTRAVENOUS at 15:00

## 2024-05-17 RX ADMIN — Medication 120 MCG: at 15:37

## 2024-05-17 RX ADMIN — SODIUM CHLORIDE, SODIUM LACTATE, POTASSIUM CHLORIDE, AND CALCIUM CHLORIDE: 600; 310; 30; 20 INJECTION, SOLUTION INTRAVENOUS at 17:01

## 2024-05-17 RX ADMIN — ONDANSETRON 4 MG: 2 INJECTION INTRAMUSCULAR; INTRAVENOUS at 17:01

## 2024-05-17 RX ADMIN — Medication 120 MCG: at 15:46

## 2024-05-17 RX ADMIN — METOROPROLOL TARTRATE 5 MG: 5 INJECTION, SOLUTION INTRAVENOUS at 15:15

## 2024-05-17 RX ADMIN — ROCURONIUM BROMIDE 20 MG: 10 INJECTION INTRAVENOUS at 15:53

## 2024-05-17 RX ADMIN — SUGAMMADEX 200 MG: 100 INJECTION, SOLUTION INTRAVENOUS at 17:30

## 2024-05-17 RX ADMIN — CEFAZOLIN 2 G: 1 INJECTION, POWDER, FOR SOLUTION INTRAMUSCULAR; INTRAVENOUS at 15:17

## 2024-05-17 RX ADMIN — PROPOFOL 20 MG: 10 INJECTION, EMULSION INTRAVENOUS at 17:19

## 2024-05-17 RX ADMIN — TRANEXAMIC ACID 1000 MG: 100 INJECTION, SOLUTION INTRAVENOUS at 15:19

## 2024-05-17 RX ADMIN — MIDAZOLAM HYDROCHLORIDE 2 MG: 1 INJECTION, SOLUTION INTRAMUSCULAR; INTRAVENOUS at 15:15

## 2024-05-17 RX ADMIN — Medication 0.3 MCG/KG/MIN: at 15:53

## 2024-05-17 RX ADMIN — LIDOCAINE HYDROCHLORIDE 4 ML: 40 SOLUTION TOPICAL at 15:05

## 2024-05-17 ASSESSMENT — PAIN - FUNCTIONAL ASSESSMENT
PAIN_FUNCTIONAL_ASSESSMENT: 0-10

## 2024-05-17 ASSESSMENT — COLUMBIA-SUICIDE SEVERITY RATING SCALE - C-SSRS
1. IN THE PAST MONTH, HAVE YOU WISHED YOU WERE DEAD OR WISHED YOU COULD GO TO SLEEP AND NOT WAKE UP?: NO
6. HAVE YOU EVER DONE ANYTHING, STARTED TO DO ANYTHING, OR PREPARED TO DO ANYTHING TO END YOUR LIFE?: NO
6. HAVE YOU EVER DONE ANYTHING, STARTED TO DO ANYTHING, OR PREPARED TO DO ANYTHING TO END YOUR LIFE?: NO
2. HAVE YOU ACTUALLY HAD ANY THOUGHTS OF KILLING YOURSELF?: NO
2. HAVE YOU ACTUALLY HAD ANY THOUGHTS OF KILLING YOURSELF?: NO
1. IN THE PAST MONTH, HAVE YOU WISHED YOU WERE DEAD OR WISHED YOU COULD GO TO SLEEP AND NOT WAKE UP?: NO

## 2024-05-17 ASSESSMENT — LIFESTYLE VARIABLES
HAVE YOU EVER FELT YOU SHOULD CUT DOWN ON YOUR DRINKING: NO
TOTAL SCORE: 0
EVER HAD A DRINK FIRST THING IN THE MORNING TO STEADY YOUR NERVES TO GET RID OF A HANGOVER: NO
HAVE PEOPLE ANNOYED YOU BY CRITICIZING YOUR DRINKING: NO
EVER FELT BAD OR GUILTY ABOUT YOUR DRINKING: NO

## 2024-05-17 ASSESSMENT — ENCOUNTER SYMPTOMS
HALLUCINATIONS: 0
WOUND: 0
VOMITING: 0
CHEST TIGHTNESS: 0
WEAKNESS: 0
NAUSEA: 0
ACTIVITY CHANGE: 0
WHEEZING: 0
FEVER: 0
NECK STIFFNESS: 0
HEADACHES: 0
CHILLS: 0
APPETITE CHANGE: 0
BACK PAIN: 0
DIFFICULTY URINATING: 0
SEIZURES: 0
MYALGIAS: 0
FACIAL SWELLING: 0
FREQUENCY: 0
SHORTNESS OF BREATH: 0
ABDOMINAL DISTENTION: 0
NECK PAIN: 0
ABDOMINAL PAIN: 0
JOINT SWELLING: 0
NERVOUS/ANXIOUS: 0
COUGH: 0
DIZZINESS: 0
EYE PAIN: 0
BRUISES/BLEEDS EASILY: 0
DIARRHEA: 0

## 2024-05-17 ASSESSMENT — PAIN DESCRIPTION - DESCRIPTORS
DESCRIPTORS: ACHING
DESCRIPTORS: ACHING;SHOOTING

## 2024-05-17 ASSESSMENT — PAIN SCALES - GENERAL
PAINLEVEL_OUTOF10: 0 - NO PAIN
PAINLEVEL_OUTOF10: 2
PAINLEVEL_OUTOF10: 0 - NO PAIN
PAINLEVEL_OUTOF10: 2
PAINLEVEL_OUTOF10: 0 - NO PAIN

## 2024-05-17 ASSESSMENT — PAIN DESCRIPTION - LOCATION: LOCATION: ARM

## 2024-05-17 ASSESSMENT — PAIN DESCRIPTION - ORIENTATION: ORIENTATION: RIGHT

## 2024-05-17 ASSESSMENT — PAIN DESCRIPTION - PAIN TYPE: TYPE: ACUTE PAIN

## 2024-05-17 NOTE — ANESTHESIA PROCEDURE NOTES
Peripheral IV  Date/Time: 5/17/2024 3:11 PM  Inserted by: SHIVANI Lockwood    Placement  Needle size: 20 G  Laterality: left  Location: hand  Site prep: alcohol  Attempts: 1

## 2024-05-17 NOTE — CONSULTS
Consults  Acute Pain Service    Jaclyn Potts is a 78 y.o. year old female patient who presents for right humerus IMN/ORIF with Dr. Bradley on 5/17/24. Acute Pain consulted for block for postoperative pain control.     Anticipated Postop Pain Issues -   Palliative: typically relieved with IV analgesics and regional local anesthetics  Provocative: typically with movement  Quality: typically burning and aching  Radiation: typically none  Severity: typically severe 8-10/10  Timing: typically constant    Past Medical History:   Diagnosis Date    Other abnormal and inconclusive findings on diagnostic imaging of breast 12/09/2020    Abnormal mammogram of left breast    Other specified disorders of breast 12/09/2020    Seroma of breast    Personal history of other diseases of the circulatory system 12/09/2020    History of hypertension    Personal history of other diseases of the respiratory system 11/26/2019    History of acute bacterial sinusitis    Squamous cell carcinoma of skin of right lower limb, including hip 12/09/2020    Squamous cell carcinoma of skin of right lower extremity        Past Surgical History:   Procedure Laterality Date    HERNIA REPAIR  11/30/2020    Hernia Repair    OTHER SURGICAL HISTORY  10/19/2020    Biopsy Skin    ROTATOR CUFF REPAIR  11/30/2020    Rotator Cuff Repair    TOTAL ABDOMINAL HYSTERECTOMY  12/09/2020    Total Abdominal Hysterectomy    UMBILICAL HERNIA REPAIR  12/09/2020    Umbilical Hernia Repair        Family History   Problem Relation Name Age of Onset    Stroke Mother      Diabetes Mother      Alzheimer's disease Father      Lung cancer Sister      No Known Problems Sister      Asthma Brother      Cancer Brother      No Known Problems Brother      Breast cancer Daughter  40    Cancer Daughter      No Known Problems Daughter          Social History     Socioeconomic History    Marital status:      Spouse name: Not on file    Number of children: Not on file    Years  of education: Not on file    Highest education level: Not on file   Occupational History    Not on file   Tobacco Use    Smoking status: Former     Current packs/day: 0.00     Types: Cigarettes     Quit date:      Years since quittin.3    Smokeless tobacco: Never   Substance and Sexual Activity    Alcohol use: Yes     Comment: rarely    Drug use: Never    Sexual activity: Not on file   Other Topics Concern    Not on file   Social History Narrative    Not on file     Social Determinants of Health     Financial Resource Strain: Not on file   Food Insecurity: Not on file   Transportation Needs: Not on file   Physical Activity: Not on file   Stress: Not on file   Social Connections: Not on file   Intimate Partner Violence: Not on file   Housing Stability: Not on file        Allergies   Allergen Reactions    Amoxicillin-Pot Clavulanate Itching     vomiting    Hydrocodone-Acetaminophen Rash and Itching         Review of Systems  Gen: No fatigue, anorexia, insomnia, fever.   Eyes: No vision loss, double vision, drainage, eye pain.   ENT: No pharyngitis, dry mouth, no hearing changes or ear discharge  Cardiac: No chest pain, palpitations, syncope, near syncope.   Pulmonary: No shortness of breath, cough, hemoptysis.   Heme/lymph: No swollen glands, fever, bleeding.   GI: No abdominal pain, change in bowel habits, melena, hematemesis, hematochezia, nausea, vomiting, diarrhea.   : No discharge, dysuria, frequency, urgency, hematuria.  Endo: No polyuria or weight loss.   Musculoskeletal: Negative for any pain or loss of ROM/weakness  Skin: No rashes or lesions  Neuro: Normal speech, no numbness or weakness. No gait difficulties  Review of systems is otherwise negative unless stated above or in history of present illness.    Physical Exam:  Constitutional:  no distress, alert and cooperative  Eyes: clear sclera  Head/Neck: No apparent injury, trachea midline  Respiratory/Thorax: Patent airways, thorax symmetric,  breathing comfortably  Cardiovascular: no pitting edema  Gastrointestinal: Nondistended  Musculoskeletal: ROM intact  Extremities: no clubbing  Neurological: alert, gaitan x4  Psychological: Appropriate affect    Results for orders placed or performed during the hospital encounter of 05/17/24 (from the past 24 hour(s))   CBC and Auto Differential   Result Value Ref Range    WBC 13.9 (H) 4.4 - 11.3 x10*3/uL    nRBC 0.0 0.0 - 0.0 /100 WBCs    RBC 4.84 4.00 - 5.20 x10*6/uL    Hemoglobin 14.3 12.0 - 16.0 g/dL    Hematocrit 41.6 36.0 - 46.0 %    MCV 86 80 - 100 fL    MCH 29.5 26.0 - 34.0 pg    MCHC 34.4 32.0 - 36.0 g/dL    RDW 13.5 11.5 - 14.5 %    Platelets 289 150 - 450 x10*3/uL    Neutrophils % 72.9 40.0 - 80.0 %    Immature Granulocytes %, Automated 0.8 0.0 - 0.9 %    Lymphocytes % 17.8 13.0 - 44.0 %    Monocytes % 6.8 2.0 - 10.0 %    Eosinophils % 1.2 0.0 - 6.0 %    Basophils % 0.5 0.0 - 2.0 %    Neutrophils Absolute 10.14 (H) 1.60 - 5.50 x10*3/uL    Immature Granulocytes Absolute, Automated 0.11 0.00 - 0.50 x10*3/uL    Lymphocytes Absolute 2.47 0.80 - 3.00 x10*3/uL    Monocytes Absolute 0.94 (H) 0.05 - 0.80 x10*3/uL    Eosinophils Absolute 0.17 0.00 - 0.40 x10*3/uL    Basophils Absolute 0.07 0.00 - 0.10 x10*3/uL   Comprehensive metabolic panel   Result Value Ref Range    Glucose 124 (H) 74 - 99 mg/dL    Sodium 137 136 - 145 mmol/L    Potassium 3.5 3.5 - 5.3 mmol/L    Chloride 100 98 - 107 mmol/L    Bicarbonate 27 21 - 32 mmol/L    Anion Gap 14 10 - 20 mmol/L    Urea Nitrogen 11 6 - 23 mg/dL    Creatinine 0.59 0.50 - 1.05 mg/dL    eGFR >90 >60 mL/min/1.73m*2    Calcium 8.4 (L) 8.6 - 10.6 mg/dL    Albumin 3.6 3.4 - 5.0 g/dL    Alkaline Phosphatase 68 33 - 136 U/L    Total Protein 6.8 6.4 - 8.2 g/dL    AST 21 9 - 39 U/L    Bilirubin, Total 1.0 0.0 - 1.2 mg/dL    ALT 29 7 - 45 U/L   Magnesium   Result Value Ref Range    Magnesium 2.30 1.60 - 2.40 mg/dL   Protime-INR   Result Value Ref Range    Protime 11.9 9.8 - 12.8  seconds    INR 1.1 0.9 - 1.1   APTT   Result Value Ref Range    aPTT 31 27 - 38 seconds   POCT GLUCOSE   Result Value Ref Range    POCT Glucose 118 (H) 74 - 99 mg/dL        Jaclyn Potts is a 78 y.o. year old female patient who presents for right humerus IMN/ORIF with Dr. Bradley on 5/17/24. Acute Pain consulted for block for postoperative pain control.     Plan:    - right interscalene single shot block performed preoperatively on 5/17/24  - Pain medications per primary team  - Will see on POD1 if inpatient    Acute Pain Team  pg [phone] ph [phone].

## 2024-05-17 NOTE — CONSULTS
Orthopaedic Surgery Consult H&P    HPI:   Orthopaedic Problems/Injuries: R proximal humerus fx  Other Injuries: none    78 y.o. female PMH (obesity, depression, trimal ORIF in 2022) presents after FOOSH injury while vacationing in [place]. Denies numbness, tingling, and open wounds on the affected limb.     PMH: per above/EMR  PSH: per above/EMR  SocHx:      -  Denies tobacco use      -  Denies EtOH use      -  Denies other drug use  FamHx:  Non-contributory to this patient's acute orthopaedic problem.   Allergies: Reviewed in EMR  Meds: Reviewed in EMR    ROS      - 14 point ROS negative except as above    Physical Exam:  Gen: AOx3, NAD  HEENT: normocephalic atraumatic  Psych: appropriate mood and affect  Resp: nonlabored breathing  Cardiac: Extremities WWP, RRR to peripheral palpation  Neuro: CN 2-12 grossly intact  Skin: no rashes    Right upper extremity:   -Skin intact.   -Tender at site of injury with painful ROM.  -Fires axillary/AIN/PIN/ulnar distributions  -SILT axillary/radial/median/ulnar distributions  -Hand warm, well perfused  -Palpable radial pulse, cap refill brisk  -Compartments soft and compressible    A full secondary exam was performed and all relevant findings discussed and noted above.    Imaging:  AP and lateral radiographs of the right proximal humerus display spiral comminuted humerus fx.    CT displays comminuted proximal humerus fx.    Assessment:  Orthopaedic Problems/Injuries: R proximal humerus fx    Plan:  - NPO today for upcoming surgery with orthopedics today.  - Admit to Trauma, clearance pending for OR today versus tomorrow; Appreciate documentation of clearance by primary team  - Please obtain pre-operative labs/studies: T&S, PT/INR, CBC, BMP, CXR, EKG  - Consented and posted to OR schedule for ORIF + IMN R humerus w/ orthopedic surgery on 5.17  - Strict Bedrest, NWB RUE extremity in sling.   - Pre-operative ABx: None indicated   - No indication for transfusion pre-operatively  -  DVT PPx: SCDs, okay for chemoppx per primary    Consult seen and staffed within 30 minutes of notification.    This consult was staffed with attending physician, Dr. Bradley.    Ger Orozco MD  PGY-1 Orthopaedic Surgery  On-call Resident  ____________________________________________________________    This patient will be followed by the Orthopaedic Trauma Team while inpatient.

## 2024-05-17 NOTE — ANESTHESIA PREPROCEDURE EVALUATION
Patient: Jaclyn Potts    Procedure Information       Anesthesia Start Date/Time: 05/17/24 1454    Procedures:       Insertion Intramedullary Nail Humerus (Right: Arm Upper)      Open Reduction Internal Fixation Humerus (Right: Arm Upper)    Location: [facility] OR 01 / [facility] OR    Surgeons: Koby Bradley MD            Relevant Problems   Cardiac   (+) Essential hypertension   (+) Mixed hyperlipidemia      Neuro   (+) Anxiety   (+) Depression      GI   (+) Esophageal varices (Multi)   (+) Gastroesophageal reflux disease without esophagitis      Liver   (+) Steatohepatitis      Endocrine   (+) Obesity      Hematology   (+) Anemia      Musculoskeletal   (+) Primary osteoarthritis involving multiple joints      ID   (+) Toenail fungus      Skin   (+) Squamous cell carcinoma of skin       Clinical information reviewed:   Tobacco  Allergies  Meds   Med Hx  Surg Hx   Fam Hx  Soc Hx        NPO Detail:  NPO/Void Status  Date of Last Liquid: 05/17/24  Time of Last Liquid: 0000  Date of Last Solid: 05/16/24  Time of Last Solid: 2000  Last Intake Type: Clear fluids; Solid meal         Physical Exam    Airway  Mallampati: III  TM distance: >3 FB  Neck ROM: full     Cardiovascular - normal exam  Rhythm: regular  Rate: normal     Dental    Pulmonary - normal exam  Breath sounds clear to auscultation     Abdominal            Anesthesia Plan    History of general anesthesia?: yes  History of complications of general anesthesia?: no    ASA 3     general     Patient did not smoke on day of procedure.    intravenous induction   Postoperative administration of opioids is intended.  Anesthetic plan and risks discussed with patient.  Use of blood products discussed with patient who consented to blood products.    Plan discussed with attending.

## 2024-05-17 NOTE — ANESTHESIA PROCEDURE NOTES
Airway  Date/Time: 5/17/2024 3:06 PM  Urgency: elective    Airway not difficult    Staffing  Performed: SHIVANI   Authorized by: Aj Satrk DO    Performed by: SHIVANI Lockwood  Patient location during procedure: OR    Indications and Patient Condition  Indications for airway management: anesthesia  Spontaneous Ventilation: absent  Sedation level: deep  Preoxygenated: yes  Patient position: sniffing  Mask difficulty assessment: 1 - vent by mask  Planned trial extubation    Final Airway Details  Final airway type: endotracheal airway      Successful airway: ETT  Cuffed: yes   Successful intubation technique: direct laryngoscopy  Facilitating devices/methods: intubating stylet  Blade: Teodora  Blade size: #3  ETT size (mm): 7.0  Cormack-Lehane Classification: grade I - full view of glottis  Placement verified by: chest auscultation and capnometry   Measured from: lips  ETT to lips (cm): 20  Number of attempts at approach: 1

## 2024-05-17 NOTE — ED TRIAGE NOTES
Pt to Ed with c/o [place]. Pt states found to have a right humeral fracture. Told to come to ED and be seen for fracture by dr galicia. Pt alert and oriented x4 no loc and no thinners. Pmh of htsn, nerve pain r/t previous shingles infection. Pt did hit face on the right side. Pt last PO intake was 05/16/2023-2300

## 2024-05-17 NOTE — SIGNIFICANT EVENT
OR Clearance    Perioperative Risk Stratification     Revised Cardiac Risk Index: 0  - Class I Risk, 3.9% 30-day risk of death, MI, or cardiac arrest     12-Lead EKG (Date: 5/17/24):  - No ischemic changes     XR Chest (Date: 5/17/24):   - No evidence of acute cardiopulmonary process     Social Risk Factors: No tobacco, ETOH, or illicit drug use     Recommendations:  Given the urgency of orthopedic surgical intervention and review of the above risk factors, there is no additional workup necessary from a trauma perspective. Final risk versus benefits of all surgical procedures is based on the surgical and the anesthesia teams' assessments. Patient is medically optimized for surgery from a trauma surgery perspective at this time.    Desiree Lyons CNP  Trauma Surgery  Floor: [id number] TICU: [id number]  Pager: [phone]

## 2024-05-17 NOTE — ANESTHESIA PROCEDURE NOTES
Peripheral Block    Patient location during procedure: pre-op  Start time: 5/17/2024 2:20 PM  End time: 5/17/2024 2:25 PM  Reason for block: procedure for pain, at surgeon's request and post-op pain management  Staffing  Performed: attending   Authorized by: Aj Stark DO    Performed by: Holley Patel MD  Preanesthetic Checklist  Completed: patient identified, IV checked, site marked, risks and benefits discussed, surgical consent, monitors and equipment checked, pre-op evaluation and timeout performed   Timeout performed at: 5/17/2024 2:15 PM  Peripheral Block  Patient position: laying flat  Prep: ChloraPrep  Patient monitoring: heart rate and continuous pulse ox  Block type: interscalene  Injection technique: single-shot  Guidance: ultrasound guided  Local infiltration: ropivacaine  Needle  Needle type: Tuohy   Needle gauge: 26 G  Needle length: 5 cm  Needle localization: ultrasound guidance     image stored in chart  Assessment  Injection assessment: negative aspiration for heme, no paresthesia on injection, incremental injection and local visualized surrounding nerve on ultrasound  Paresthesia pain: none  Heart rate change: no  Slow fractionated injection: no  Additional Notes  Interscalene brachial plexus block: Informed consent obtained.  Risks and benefits discussed.  ASA monitors placed, timeout performed.  Patient position, prepped with chlorhexidine, and draped with sterile towels.  Ultrasound guidance used to visualize the brachial plexus and surrounding structures with visualization of the needle throughout duration of the procedure.  Aspiration was negative.  20 cc 0.5% ropivacaine and 8 mcg of precedex injected.  Patient tolerated procedure well.      Timeout by PACU RN

## 2024-05-17 NOTE — ED PROVIDER NOTES
HPI   Chief Complaint   Patient presents with    broken humerus       HPI  78-year-old female with history of hypertension, hyperlipidemia, depression who presents following a fall.  Patient reports tripping over a curb while vacationing in [place] this past Sunday at which time she fell within outstretched right hand.  She reports she was found to have a right humerus fracture.  She did speak with her orthopedic surgeon, Dr. Bradley, who instructed her to present to the ED this morning.  Patient does report head strike but no loss of consciousness or blood thinner use at the time of the fall.                  P[place]as Coma Scale Score: 15                     Patient History   Past Medical History:   Diagnosis Date    Other abnormal and inconclusive findings on diagnostic imaging of breast 12/09/2020    Abnormal mammogram of left breast    Other specified disorders of breast 12/09/2020    Seroma of breast    Personal history of other diseases of the circulatory system 12/09/2020    History of hypertension    Personal history of other diseases of the respiratory system 11/26/2019    History of acute bacterial sinusitis    Squamous cell carcinoma of skin of right lower limb, including hip 12/09/2020    Squamous cell carcinoma of skin of right lower extremity     Past Surgical History:   Procedure Laterality Date    HERNIA REPAIR  11/30/2020    Hernia Repair    OTHER SURGICAL HISTORY  10/19/2020    Biopsy Skin    ROTATOR CUFF REPAIR  11/30/2020    Rotator Cuff Repair    TOTAL ABDOMINAL HYSTERECTOMY  12/09/2020    Total Abdominal Hysterectomy    UMBILICAL HERNIA REPAIR  12/09/2020    Umbilical Hernia Repair     Family History   Problem Relation Name Age of Onset    Stroke Mother      Diabetes Mother      Alzheimer's disease Father      Lung cancer Sister      No Known Problems Sister      Asthma Brother      Cancer Brother      No Known Problems Brother      Breast cancer Daughter  40    Cancer Daughter      No Known  Problems Daughter       Social History     Tobacco Use    Smoking status: Former     Current packs/day: 0.00     Types: Cigarettes     Quit date: 2010     Years since quittin.3    Smokeless tobacco: Never   Substance Use Topics    Alcohol use: Yes     Comment: rarely    Drug use: Never       Physical Exam   ED Triage Vitals [24 0912]   Temperature Heart Rate Respirations BP   37 °C (98.6 °F) 83 20 (!) 182/93      Pulse Ox Temp src Heart Rate Source Patient Position   96 % -- Monitor --      BP Location FiO2 (%)     -- 21 %       Physical Exam  Vitals and nursing note reviewed.   Constitutional:       Appearance: Normal appearance.   HENT:      Head: Normocephalic.      Comments: Right periorbital ecchymoses     Mouth/Throat:      Mouth: Mucous membranes are moist.   Eyes:      Conjunctiva/sclera: Conjunctivae normal.   Cardiovascular:      Rate and Rhythm: Normal rate.      Comments: Equal 2+ radial pulses bilaterally  Pulmonary:      Effort: Pulmonary effort is normal.   Abdominal:      General: Abdomen is flat.   Musculoskeletal:      Comments: Pain, swelling, ecchymoses about right upper extremity  Ecchymoses extend across right chest  Normal  strength of right hand, normal wrist flexion extension bilaterally   Neurological:      Mental Status: She is alert and oriented to person, place, and time.      Comments: Normal sensation in C6, C7, C8 dermatomes on bilateral upper extremities   Psychiatric:         Mood and Affect: Mood normal.         ED Course & MDM   ED Course as of 24 1634   Fri May 17, 2024   1107 XR humerus right  I independently interpreted:   Comminuted fracture of the right humeral head [SHAN]   1151 CT head wo IV contrast  I independently interpreted:   No acute intracranial or skull pathology.  Nasal bone deformity that appears chronic, otherwise no acute facial fractures, no C-spine injury [SHAN]   1248 CT chest abdomen pelvis w IV contrast  I independently interpreted:    Right lateral 7th rib fracture in addition to redemonstration of comminuted right humeral head fracture [SHAN]   1249 CT thoracic spine wo IV contrast  I independently interpreted:   Atraumatic T- and L-spine [SHAN]      ED Course User Index  [SHAN] Al GARCIA Golden,          Diagnoses as of 05/17/24 1634   Humeral head fracture, right, closed, initial encounter   Closed fracture of one rib of right side, initial encounter       Medical Decision Making  78-year-old female with history of hypertension, hyperlipidemia, depression who presents following a fall with head strike but no loss of consciousness or blood thinner use.  Patient reports she was diagnosed with a right humerus fracture at outside hospital.  On exam, patient is hypertensive, otherwise vitals normal, she is in no acute distress and nontoxic-appearing, presents with right upper extremity in a sling, right upper extremity significantly swollen with scattered ecchymoses and tenderness to palpation, she is neurovascular intact distally.  She also has scattered ecchymoses about her right periorbit but no clinical signs of entrapment, as well as right chest ecchymoses for which we did proceed with CT head, face, C-spine in addition to plain films of the right upper extremity and chest.    Plain films revealed comminuted and displaced right humeral head fracture for which orthopedic surgery was consulted.  Trauma surgery also consulted given finding of seventh rib fracture on plain films.  Patient was pan scanned per trauma recommendations that showed no other acute traumatic injuries other than the right humerus and right seventh rib fracture.  Patient was taken to the OR with orthopedic surgery and will be admitted to their service following.        Procedure  Procedures    Attending Note:  The patient was seen by the resident/fellow/GODFREY.  I have personally performed a substantive portion of the encounter.  I have seen and examined the patient; agree with the  workup, evaluation, MDM, management and diagnosis with the exception/addition of the following.  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 The patient reports that she was in [place] for a vacation recently and tripped on a curb near the [facility] over the weekend, reportedly sustaining a midshaft R humeral fracture. She reports that she struck her face but does not believe she lost consciousness. She denies AC use.  She reports that she underwent a CT head which she believes was negative for acute traumatic injuries. She notes pain over her R shoulder and humerus, but denies other acute pain. She denies loss of sensation or motor function distally. She reports that she spoke to her orthopedic surgeon, Dr. Bradley and was instructed to go to the ED for evaluation.       Additional History Obtained from: See HPI       Physical Exam:  General: Grossly well-developed, awake, alert, NAD    HEENT: Ecchymosis inferior to R orbit. EOMI without pain with EOM or signs of entrapment. Midface stable. No obvious nasal septal hematoma. No obvious acute intraoral lacerations/dental trauma.     Neck: Supple, trachea midline. No apparent midline C spine TTP/stepoff/deformities.     Neurologic: A&Ox4, FS, TM, EOMI, PERRL, KOHLER x 4 with grossly symmetric strength (aside from RUE proximally 2/2 injury), otherwise 5/5, SILT grossly intact BUE and BLE    Cardiovascular: RRR, pulses grossly symmetric    Respiratory: CTA B/L, no wheeze, rales or rhonchi. Chest wall no crepitus.     Abdomen: Soft, not appreciably tender, no evident rebound/guarding    Back:  No apparent CVA TTP, no midline TLS spine TTP    MSK: RUE in sling/swath with TTP over mid humerus/shoulder.  <2 sec capillary refill. Median/ulnar/radial nerve sensory/motor intact in RUE and LUE. Denies bony TTP in LUE and BLE. Otherwise no gross bony deformities in BUE and BLE    Skin/Integumentary: Warm and dry    Psychiatric: Calm and cooperative. Normal mood and affect.       Differential Diagnoses Considered:  See MDM below    Chronic Medical Conditions Significantly Affecting Care:  See MDM below    External Records Reviewed:  I reviewed recent and relevant outside records as noted in HPI above and MDM below.     Independent Interpretation of Studies:    Laboratory/Imaging Studies:  Laboratory results personally reviewed and independently interpreted:  CBC without significant anemia, thrombocytopenia. Leukocytosis of 13.9  Metabolic panel without MADELYN, HAGMA or significant electrolyte anomalies  No hypomagnesemia  INR/APTT WNL  O negative    Radiology imaging and preliminary and/or final reports personally reviewed/independently interpreted:  XR shoulder R, XR humerus   IMPRESSION:  Impression:  Comminuted fracture of the RIGHT humeral head and proximal diaphysis  with approximately 2.5 cm displacement and mild foreshortening.  No  humeral dislocation identified.  Visualized clavicle without fracture  or dislocation.  No adjacent rib fracture identified.      XR elbow R  IMPRESSION:  No findings of an acute process.      CXR  IMPRESSION:  Fracture of the RIGHT lateral seventh rib.  The age of this finding  uncertain.  Correlate clinically.    CT head/face/C spine  IMPRESSION:  CT head:  No acute intracranial abnormality. No calvarial fracture.      CT facial bones:  No acute abnormality of the facial bones. Deformity of the right  nasal bone is probably chronic in etiology, correlate with direct  palpation as clinically indicated.      CT C-spine:  No acute traumatic injury or measurable spinal canal stenosis  of the  cervical spine. Multilevel degenerative changes as above.      CT T/L spine, C/A/P  IMPRESSION:  1. Comminuted fracture of the proximal right humerus extending into  the humeral head.  2. Nondisplaced fracture through the right seventh rib laterally.   Right fourth rib appears to be intact.  3. Moderate to severe hepatic steatosis with some morphologic changes  raising the possibility of underlying cirrhosis. No ascites.  4. Small hiatal hernia.  5. Colonic diverticulosis without findings of diverticulitis.      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 The patient sustained a mechanical fall from standing several days ago.  She reports that she was diagnosed with a R humerus fracture at OSH.  She was immobilized with a sling/swath.  She appeared neurovascularly intact in the affected extremity.  Given inability to view OSH imaging/records and facial trauma, we obtained a CT head, face and C spine which was notable for likely old deformity of nasal bone per read, but were otherwise without acute process.  We obtained plain films of the shoulder and humerus which demonstrated fracture as above, as well as rib fracture.  We consulted orthopedics and trauma.  CT shoulder, and T/L spine, as well as C/A/P were ordered actually by consultants rather than myself.  Results as noted above.  Trauma felt no acute intervention from their standpoint and recommended admission to orthopedics.  Patient taken to OR by orthopedics for further management.  An opportunity to ask questions was provided and all that were expressed were addressed  at that time. The patient and/or family member(s) verbalized understanding and were in agreement with plan.            Al Franks DO  Resident  05/17/24 [id number]       Ricardo Coronel MD  05/18/24 3535

## 2024-05-17 NOTE — H&P
[facility] Department of Orthopaedic Surgery   Surgical History & Physical <30 Days    Reason for Surgery: Right proximal humerus fx  Planned Procedure: Operative fixation R humerus    History & Physical Reviewed:  I have reviewed the History and Physical for obtained within the last 30 days. Relevant findings and updates are noted below:  No significant changes.    Home medications were reviewed with significant updates noted below:  No significant changes.    ERAS patient?: No    COVID-19 Risk Consent:   Surgeon has reviewed the key risks related to bee COVID-19 and subsequent sequelae.     05/17/24 at 11:27 AM - Ger Orozco MD

## 2024-05-17 NOTE — ANESTHESIA POSTPROCEDURE EVALUATION
Patient: Jaclyn Potts    Procedure Summary       Date: 05/17/24 Room / Location: [facility] OR 01 / [facility] OR    Anesthesia Start: 1454 Anesthesia Stop: 1742    Procedure: Open Reduction Internal Fixation Humerus (Right: Arm Upper) Diagnosis:       2-part displaced fracture of surgical neck of right humerus, initial encounter for closed fracture      (2-part displaced fracture of surgical neck of right humerus, initial encounter for closed fracture [S42.221A])    Surgeons: Koby Bradley MD Responsible Provider: Aj Stark DO    Anesthesia Type: general ASA Status: 3            Anesthesia Type: general    Vitals Value Taken Time   /64 05/17/24 1738   Temp 36 °C (96.8 °F) 05/17/24 1737   Pulse 105 05/17/24 1740   Resp 19 05/17/24 1740   SpO2 96 % 05/17/24 1740   Vitals shown include unfiled device data.    Anesthesia Post Evaluation    Patient location during evaluation: PACU  Patient participation: complete - patient participated  Level of consciousness: awake  Pain management: adequate  Airway patency: patent  Cardiovascular status: acceptable  Respiratory status: acceptable  Hydration status: acceptable  Postoperative Nausea and Vomiting: none        No notable events documented.

## 2024-05-17 NOTE — OP NOTE
ORTHOPAEDIC SURGERY OPERATIVE REPORT      Date of Surgery: 5/17/2024    Surgeon: Koby Bradley MD    Assistant Surgeon: MD Dr. Simón Martines participated in this case as the assistant surgeon, performing components of the positioning, approach, debridement, reduction, fixation, and closure. Due to the nature and complexity of the case, no qualified resident of an appropriate level was available to assist.    Assistants:  Ari Lopez, PGY1    Preoperative Diagnosis:  Right proximal humerus fracture  Right proximal humeral shaft fracture    Postoperative Diagnosis:  Right proximal humerus fracture  Right proximal humeral shaft fracture    Procedures Performed:  Right proximal humerus open reduction internal fixation  Right humeral shaft open reduction internal fixation    Anesthesia: General anesthesia    IV Fluids: Per anesthesia record    Estimated Blood Loss: 100 mL    Complications: None    Implants:   [place] Pangea 3.5 mm proximal humeral locking plate with associated cortical and locking screws  Diana 2.7 mm cortical screws x 2    Specimens: None    Intraoperative Findings: Stable fracture fixation and safe extra-articular hardware placement noted at the conclusion of the case.    Indications For Procedure:  Jaclyn Potts is a 78 y.o. female who sustained a ground-level fall while vacationing in [place].  The patient was identified at a local facility with a right proximal humerus fracture as well as humeral shaft fracture.  The patient was placed into a sling and returned back to [place] where she resides.  At this point, she presented to the emergency department where imaging confirmed her injury.  Evaluation of the CT scan of the shoulder reveals evidence of pre-existing shoulder arthrosis with questionable impaction fracture of the head.  Patient reported that she has history of shoulder dislocation in the past almighty with closed treatment.  Some of the finding  radiographically of arthrosis of the shoulder is likely secondary to posttraumatic changes.  I discussed with her that given the pre-existing arthrosis in the shoulder high risk of posttraumatic progression of arthrosis and need for conversion to arthroplasty in the future is higher.  We also discussed risk of avascular necrosis.  Her fracture pattern is a combination of the at least a three-part proximal humerus fracture with a long spiral proximal humeral shaft fracture.  We will plan to repair each of the component of the fracture.  Due to the nature of the patient's injury, they were indicated for operative stabilization.  Informed consent was obtained after discussing the risks, benefits, and alternatives to the procedure.  Risks include pain, bleeding, infection, damage to nearby structures, malunion, nonunion, hardware complications, need for further procedures, blood clots, anesthesia risks, and death.  Decision was made to proceed.  The patient was then brought to the preoperative holding area on the day of surgery.    Procedure Detail:  The patient was met in the preoperative holding area where their identity was confirmed and the operative extremity was marked. The patient was taken back to the operating theater where a preinduction timeout was performed which confirmed the patient's identity, procedure to be performed, correct operative site, availability of imaging and implants, allergies, and preoperative antibiotics. Everyone present was in agreement. They were placed under general anesthesia without complication. The patient was transferred to the operating table and placed in the supine position. All bony prominences were well-padded. The patient's right upper extremity was then prepped and draped in the usual sterile fashion. A preprocedure timeout was performed which again confirmed the patient's identity, procedure to be performed, and correct operative site.  Everyone present was in agreement.  Preoperative antibiotics were administered.    We began by marking out the bony landmarks of the shoulder.  We marked out a standard direct lateral approach to the proximal humerus.  Incision was made through skin and subcutaneous tissue.  Deeper dissection was taken above electrocautery until the deltoid fascia was identified.  The raphae between the anterior and middle thirds of the deltoid was identified and split.  This dissection was taken down to level of bone.  The axillary nerve was readily identified and protected throughout the entirety of the case.  At this point, we were able to identify both the proximal humerus fracture as well as the humeral shaft fracture.  We cleaned out interposed fracture hematoma with the use of curette and irrigation.  We were able to reduce the humeral shaft with a combination of traction, internal rotation, and the use of multiple small pointed reduction clamps.  Once this was reduced, we confirmed our reduction with the use of fluoroscopy.  We placed two 2.7 mm independent cortical screws in lag by technique fashion.  This compressed the fracture very well.  Following this, we placed a #2 FiberWire suture and used this in a [place] fashion up and down the rotator cuff as a later offloading stitch.  We were able to reduce the proximal humerus fracture by manipulating this provisionally stabilized this with a K wire.  The posterior greater tuberosity fragment with the rotator cuff attached was reduced and pinned in place provisionally.  We selected our Diana Pangea 3.5 mm proximal humerus locking plate and provisionally positioned this on bone.  We did have to extend our incision slightly distally to allow for a plate of sufficient length to bypass the fracture.  We did extend our deltoid split distally and elevated off of a little bit of the deltoid insertion, though the remainder of this remained intact.  Fluoroscopy confirmed appropriate plate position.  This was first  brought down to bone distally with a bicortical nonlocking screw.  This brought the plate down to bone well and the fracture reduction remained satisfactory.  We did fine-tune our reduction use of a lobster claw clamp over the fracture and the plate.  Once this was performed, we placed multiple unicortical locking screws in the humeral head as well as several bicortical screws in the proximal aspect of the fracture, as well as multiple nonlocking screws distally.  Fluoroscopy confirmed maintained satisfactory fracture reduction as well as safe hardware placement.  We passed the previously placed FiberWire suture through the plate and tied this down as our offloading stitch.  We copiously irrigated the wound with normal saline.  Vancomycin and tobramycin powder was placed within the wound.  The deltoid fascia and distal deltoid insertion was repaired using 0 PDS suture.  The deep dermal layer was closed using 2-0 Monocryl followed by staples for the skin.  All counts were correct x 2 at this time.  Sterile dressing was applied.    The drapes were taken down and the patient was awoken from anesthesia without complication. They were transferred back to their hospital bed and taken to PACU in stable condition.    Postoperative Plan:  The patient will be nonweightbearing to the right upper extremity at this time.  She will utilize a sling as needed for comfort.  Patient will receive postoperative antibiotics.  We will plan to start the patient on aspirin 81 mg twice daily for DVT prophylaxis. The patient will follow up with Dr. Koby Bradley MD in approximately 2 weeks time for clinical check, suture removal, and 3 view xrays of the right shoulder (AP/Grashey/Scap Y) and 2 view x-rays of the right humerus (AP/lateral) at that time.      Dr. Koby Bradley MD was present for the entirety of the case.      He Gr MD  Orthopaedic Trauma Fellow  5/17/2024

## 2024-05-17 NOTE — H&P
[facility]  TRAUMA SERVICE - HISTORY AND PHYSICAL / CONSULT    Patient Name: Jaclyn Potts  MRN: [id number]  Admit Date: 517  : 1945  AGE: 78 y.o.   GENDER: female  ==============================================================================  MECHANISM OF INJURY / CHIEF COMPLAINT:   Jaclyn Zuniga is a 78 year old female who arrived to the ED after mechanical GLF in [place] on  (). Patient sustained a humerus fx of the right arm, was seen in an emergency room there and flew back to [place] on  to pursue repair due to extended wait times in [place] for surgical intervention. Incidentally the patient CT C/A/P revealed a possible fx of the right 7th rib laterally, however patient denies pain on assessment and reports this to be an old fx. Pt denies chest pain, N/V, abdominal pain, SOB, dizziness or loss of sensation of injured extremity.   LOC (yes/no?): No  Anticoagulant / Anti-platelet Rx? (for what dx?): Denies  Referring Facility Name (N/A for scene EMR run): N/A    INJURIES:   Right Humerus fx  R 7th rib fx    OTHER MEDICAL PROBLEMS:  HTN  HLD  Depression  Nerve pain from previous shingles    INCIDENTAL FINDINGS:  -    ==============================================================================  ADMISSION PLAN OF CARE:  # Right Humerus fx  Ortho consulted  - Scheduled for ORIF + IMN R humerus  patient clear for OR from a trauma perspective  - Follow Orthopedic recommendations post op    #R 7th rib fx  - Pt denying pain on exam and reports remote fracture of rib. Considering reported hx, denial of pain, and no reported SOB no acute intervention is needed at this time.  - Q1h IS post op  - Pulm hygiene      Dispo: After discussion with Dr. Batista and Dr. Bradley patient will be admitted to Orthopedic service postoperatively. Trauma signing off, please submit a new consult if new concern arises.    Consultants notified (specialty, provider name,  time): Ortho    Pt seen and discussed with Desiree Russ CNP  Trauma Surgery  Floor: [id number] TICU: [id number]  Pager: [phone]    Total face to face time spent with patient of 45 minutes, with >50% of the time spent discussing plan of care/management, counseling/educating on disease processes, explaining results of diagnostic testing.    ==============================================================================  PAST MEDICAL HISTORY:   PMH:   Past Medical History:   Diagnosis Date    Other abnormal and inconclusive findings on diagnostic imaging of breast 2020    Abnormal mammogram of left breast    Other specified disorders of breast 2020    Seroma of breast    Personal history of other diseases of the circulatory system 2020    History of hypertension    Personal history of other diseases of the respiratory system 2019    History of acute bacterial sinusitis    Squamous cell carcinoma of skin of right lower limb, including hip 2020    Squamous cell carcinoma of skin of right lower extremity       PSH:   Past Surgical History:   Procedure Laterality Date    HERNIA REPAIR  2020    Hernia Repair    OTHER SURGICAL HISTORY  10/19/2020    Biopsy Skin    ROTATOR CUFF REPAIR  2020    Rotator Cuff Repair    TOTAL ABDOMINAL HYSTERECTOMY  2020    Total Abdominal Hysterectomy    UMBILICAL HERNIA REPAIR  2020    Umbilical Hernia Repair     FH:   Family History   Problem Relation Name Age of Onset    Stroke Mother      Diabetes Mother      Alzheimer's disease Father      Lung cancer Sister      No Known Problems Sister      Asthma Brother      Cancer Brother      No Known Problems Brother      Breast cancer Daughter  40    Cancer Daughter      No Known Problems Daughter       SOCIAL HISTORY:    Smoking:    Social History     Tobacco Use   Smoking Status Former    Current packs/day: 0.00    Types: Cigarettes    Quit date:     Years since quittin.3    Smokeless Tobacco Never       Alcohol:    Social History     Substance and Sexual Activity   Alcohol Use Yes    Comment: rarely       Drug use: Denies all illicit drug use    MEDICATIONS:   Prior to Admission medications    Medication Sig Start Date End Date Taking? Authorizing Provider   albuterol (Proventil HFA) 90 mcg/actuation inhaler Inhale 2 puffs 4 times a day as needed.    Historical Provider, MD   albuterol 0.63 mg/3 mL nebulizer solution Take by nebulization. 5/7/21   Historical Provider, MD   calcium carb,gluc/mag ox,gluc (CALCIUM MAGNESIUM ORAL) 1 tablet once daily.    Historical Provider, MD   doxycycline (Adoxa) 100 mg tablet Take 1 tablet (100 mg) by mouth if needed. 5/15/23   Historical Provider, MD   ergocalciferol (Vitamin D-2) 1.25 MG (77036 UT) capsule TAKE 1 CAPSULE BY MOUTH ONE TIME PER WEEK FOR 90 DAYS 10/18/23   Gilberto Corrales MD   estradiol (Estrace) 1 mg tablet Take 1 tablet (1 mg) by mouth once daily. 2/20/24 2/19/25  Yasir Blanco MD   gabapentin (Neurontin) 100 mg capsule Take 1 capsule (100 mg) by mouth 3 times a day. 10/11/23 10/10/24  Gilberto Corrales MD   hydroCHLOROthiazide (HYDRODiuril) 25 mg tablet TAKE 1 TABLET BY MOUTH EVERY DAY FOR 90 DAYS 2/8/24 2/2/25  Gilberto Corrales MD   ipratropium-albuteroL (Duo-Neb) 0.5-2.5 mg/3 mL nebulizer solution Take 3 mL by nebulization every 6 hours if needed for wheezing or shortness of breath. 10/6/23   Gilberto Corrales MD   lidocaine (Xylocaine) 2 % solution Take by mouth. 4/23/21   Historical Provider, MD   meloxicam (Mobic) 15 mg tablet Take 1 tablet (15 mg) by mouth once daily. 10/6/23   Gilberto Corrales MD   multivitamin tablet Take by mouth.    Historical Provider, MD   potassium chloride CR 20 mEq ER tablet TAKE 1 TABLET BY MOUTH EVERY DAY WITH FOOD FOR 90 DAYS 10/18/23   Gilberto Corrales MD   tiZANidine (Zanaflex) 4 mg tablet Take 1 tablet (4 mg) by mouth every 8 hours if needed for muscle spasms. 10/6/23   Gilberto Corrales MD    triamcinolone (Kenalog) 0.1 % cream Triamcinolone Acetonide 0.1 % External Cream   Quantity: 30  Refills: 0        Start : 9-Jun-2021   Active 6/9/21   Historical Provider, MD   zolpidem (Ambien) 10 mg tablet TAKE 1 TABLET BY MOUTH EVERY DAY AT BEDTIME AS NEEDED FOR 90 DAYS 1/10/24   Gilberto Corrales MD     ALLERGIES:   Allergies   Allergen Reactions    Amoxicillin-Pot Clavulanate Itching     vomiting    Hydrocodone-Acetaminophen Rash and Itching       REVIEW OF SYSTEMS:  Review of Systems   Constitutional:  Negative for activity change, appetite change, chills and fever.   HENT:  Negative for congestion, ear pain and facial swelling.    Eyes:  Negative for pain.   Respiratory:  Negative for cough, chest tightness, shortness of breath and wheezing.    Cardiovascular:  Negative for chest pain.   Gastrointestinal:  Negative for abdominal distention, abdominal pain, diarrhea, nausea and vomiting.   Genitourinary:  Negative for difficulty urinating and frequency.   Musculoskeletal:  Negative for back pain, joint swelling, myalgias, neck pain and neck stiffness.   Skin:  Negative for wound.   Neurological:  Negative for dizziness, seizures, syncope, weakness and headaches.   Hematological:  Does not bruise/bleed easily.   Psychiatric/Behavioral:  Negative for hallucinations. The patient is not nervous/anxious.      PHYSICAL EXAM:  PRIMARY SURVEY:  Airway  Airway is patent.     Breathing  Breathing is normal. Right breath sounds are normal. Left breath sounds are normal.     Circulation  Cardiac rhythm is regular. Rate is regular. There is no murmur present. There is no pericardial rub present.   Pulses  Radial: 2+ on the right; 2+ on the left.  Femoral: 2+ on the right; 2+ on the left.  Pedal: 2+ on the right; 2+ on the left.    Disability  Marta Coma Score  Eye:4   Verbal:5   Motor:6      15  Pupils  Right Pupil:   round and reactive        Left Pupil:   round and reactive           Motor Strength   strength:   5/5 on the right  5/5 on the left  Dorsiflex strength:  5/5 on the right  5/5 on the left  Plantarflex strength:  5/5 on the right  5/5 on the left  The patient does not have a sensory deficit.       SECONDARY SURVEY/PHYSICAL EXAM:  Physical Exam  Constitutional:       General: She is not in acute distress.     Appearance: She is not ill-appearing or toxic-appearing.   HENT:      Head: Normocephalic.      Right Ear: External ear normal.      Left Ear: External ear normal.      Nose: Nose normal.      Mouth/Throat:      Mouth: Mucous membranes are moist.      Pharynx: Oropharynx is clear.   Eyes:      Pupils: Pupils are equal, round, and reactive to light.      Comments: Old periorbital bruising around right eye and top of nasal bridge   Cardiovascular:      Rate and Rhythm: Normal rate and regular rhythm.      Pulses: Normal pulses.      Heart sounds: Normal heart sounds. No murmur heard.     No friction rub. No gallop.   Pulmonary:      Effort: Pulmonary effort is normal. No respiratory distress.      Breath sounds: Normal breath sounds. No wheezing, rhonchi or rales.      Comments: Healing bruise on right chest wall, no TTP, breath sounds clear throughout  Chest:      Chest wall: No tenderness.   Abdominal:      General: There is no distension.      Palpations: Abdomen is soft.      Tenderness: There is no abdominal tenderness.   Musculoskeletal:         General: Swelling, tenderness and signs of injury present.      Cervical back: Normal range of motion. No tenderness.      Comments: Right arm in sling with obvious bruising and swelling of upper arm, Pt strengths 5/5 BUE with no loss of sensory or motor function.   Skin:     General: Skin is warm and dry.      Capillary Refill: Capillary refill takes less than 2 seconds.   Neurological:      General: No focal deficit present.      Mental Status: She is alert and oriented to person, place, and time.      Motor: No weakness.   Psychiatric:         Mood and Affect:  Mood normal.         Behavior: Behavior normal.       IMAGING SUMMARY:  (summary of findings, not a copy of dictation)  CT Head/Face: No acute intracranial abnormality. No calvarial fracture.   CT C-Spine: No acute traumatic injury or measurable spinal canal stenosis of the cervical spine.   CT Chest/Abd/Pelvis: Nondisplaced fracture through the right seventh rib laterally. Right fourth rib appears to be intact. Moderate to severe hepatic steatosis with some morphologic changes raising the possibility of underlying cirrhosis. No ascites.   CXR/PXR: Fracture of the RIGHT lateral seventh rib.  The age of this finding uncertain.   Other(s):     LABS:  Results from last 7 days   Lab Units 05/17/24  1001   WBC AUTO x10*3/uL 13.9*   HEMOGLOBIN g/dL 14.3   HEMATOCRIT % 41.6   PLATELETS AUTO x10*3/uL 289   NEUTROS PCT AUTO % 72.9   LYMPHS PCT AUTO % 17.8   MONOS PCT AUTO % 6.8   EOS PCT AUTO % 1.2     Results from last 7 days   Lab Units 05/17/24  1001   APTT seconds 31   INR  1.1     Results from last 7 days   Lab Units 05/17/24  1001   SODIUM mmol/L 137   POTASSIUM mmol/L 3.5   CHLORIDE mmol/L 100   CO2 mmol/L 27   BUN mg/dL 11   CREATININE mg/dL 0.59   CALCIUM mg/dL 8.4*   PROTEIN TOTAL g/dL 6.8   BILIRUBIN TOTAL mg/dL 1.0   ALK PHOS U/L 68   ALT U/L 29   AST U/L 21   GLUCOSE mg/dL 124*     Results from last 7 days   Lab Units 05/17/24  1001   BILIRUBIN TOTAL mg/dL 1.0           I have reviewed all laboratory and imaging results ordered/pertinent for this encounter.

## 2024-05-18 NOTE — DISCHARGE SUMMARY
Discharge Diagnosis  2-part displaced fracture of surgical neck of right humerus, initial encounter for closed fracture    Issues Requiring Follow-Up  Routine postoperative follow up    Test Results Pending At Discharge  Pending Labs       No current pending labs.            Hospital Course   78 year-old F who presented with R proximal humerus and proximal humeral shaft fx while on vacation in [facility] 5 days ago. Patient is now s/p ORIF R proximal humerus and R proximal humeral shaft fx on 5/17/24 by Dr. Bradley. On the day of surgery, patient was identified in the pre-operative holding area and agreeable to proceed with surgery. Written consent was obtained.  Please see operative note for further details of this procedure. Patient received 24 hours of donn-operative antibiotics. Patient recovered in the PACU before transfer to a regular nursing floor. Patient was started on oxycodone, tylenol, and  for pain control and ASA 81 mg bid for DVT prophylaxis. Ely was ambulating well postoperatively and requested to go home. Given her stable vitals and good ambulation along with good family support, ortho team felt comfortable with discharge. At the time of discharge, patient was afebrile with stable vital signs. Patient was neurovascularly intact at time of discharge. Patient was discharged with prescription of ASA 81 mg bid for DVT prophylaxis for 4 weeks. Patient will follow-up with Dr. Bradley in 2 weeks for post-operative visit.     Home Medications     Medication List      START taking these medications     acetaminophen 325 mg tablet; Commonly known as: TylenoL; Take 2 tablets   (650 mg) by mouth every 6 hours if needed for mild pain (1 - 3) or fever   (temp greater than 38.0 C). For up to 14 days   aspirin 81 mg EC tablet; Take 1 tablet (81 mg) by mouth 2 times a day   for 28 days.   calcium carbonate-vitamin D3 600 mg-10 mcg (400 unit) tablet; Take 1   tablet by mouth 2 times a day.   docusate sodium 100  mg capsule; Commonly known as: Colace; Take 1   capsule (100 mg) by mouth 2 times a day as needed for constipation for up   to 7 days.   oxyCODONE 5 mg immediate release tablet; Commonly known as: Roxicodone;   Take 1 tablet (5 mg) by mouth every 6 hours if needed for severe pain (7 -   10) for up to 7 days.     CONTINUE taking these medications     CALCIUM MAGNESIUM ORAL   doxycycline 100 mg tablet; Commonly known as: Adoxa   ergocalciferol 1.25 MG (84027 UT) capsule; Commonly known as: Vitamin   D-2; TAKE 1 CAPSULE BY MOUTH ONE TIME PER WEEK FOR 90 DAYS   estradiol 1 mg tablet; Commonly known as: Estrace; Take 1 tablet (1 mg)   by mouth once daily.   gabapentin 100 mg capsule; Commonly known as: Neurontin; Take 1 capsule   (100 mg) by mouth 3 times a day.   hydroCHLOROthiazide 25 mg tablet; Commonly known as: HYDRODiuril; TAKE 1   TABLET BY MOUTH EVERY DAY FOR 90 DAYS   ipratropium-albuteroL 0.5-2.5 mg/3 mL nebulizer solution; Commonly known   as: Duo-Neb; Take 3 mL by nebulization every 6 hours if needed for   wheezing or shortness of breath.   lidocaine 2 % solution; Commonly known as: Xylocaine   meloxicam 15 mg tablet; Commonly known as: Mobic; Take 1 tablet (15 mg)   by mouth once daily.   multivitamin tablet   potassium chloride CR 20 mEq ER tablet; Commonly known as: [place]-Con M20;   TAKE 1 TABLET BY MOUTH EVERY DAY WITH FOOD FOR 90 DAYS   * albuterol 0.63 mg/3 mL nebulizer solution   * Proventil HFA 90 mcg/actuation inhaler; Generic drug: albuterol   tiZANidine 4 mg tablet; Commonly known as: Zanaflex; Take 1 tablet (4   mg) by mouth every 8 hours if needed for muscle spasms.   triamcinolone 0.1 % cream; Commonly known as: Kenalog   zolpidem 10 mg tablet; Commonly known as: Ambien; TAKE 1 TABLET BY MOUTH   EVERY DAY AT BEDTIME AS NEEDED FOR 90 DAYS  * This list has 2 medication(s) that are the same as other medications   prescribed for you. Read the directions carefully, and ask your doctor or   other care  provider to review them with you.       Outpatient Follow-Up  Future Appointments   Date Time Provider Department Center   5/22/2024  1:00 PM [facility]HU JARRETT 5 [facility]CAHUMAM [facility]   5/22/2024  2:00 PM TERE Almanza [id number] [facility]   6/4/2024  1:30 PM Anum Wu PA-C [id number] [facility]   7/12/2024  3:15 PM Gilberto Corrales MD [id number] [facility]   8/6/2024  2:30 PM Koby Bradley MD [id number] [facility]   10/17/2024  3:00 PM Gilberto Corrales MD [id number] [facility]   10/23/2024  2:15 PM Anastasia Brandon MD [id number] [facility]       Kumar Maxwell MD

## 2024-05-20 ENCOUNTER — DOCUMENTATION (OUTPATIENT)
Dept: PRIMARY CARE | Facility: CLINIC | Age: 79
End: 2024-05-20
Payer: COMMERCIAL

## 2024-05-20 ENCOUNTER — PATIENT OUTREACH (OUTPATIENT)
Dept: PRIMARY CARE | Facility: CLINIC | Age: 79
End: 2024-05-20
Payer: COMMERCIAL

## 2024-05-20 NOTE — PROGRESS NOTES
Patient has declined PCP follow up at this time She states that she will be following with Ortho. She states that she will call and schedule with PCP on an as needed basis. She was very thankful for this call.

## 2024-05-22 ENCOUNTER — APPOINTMENT (OUTPATIENT)
Dept: RADIOLOGY | Facility: CLINIC | Age: 79
End: 2024-05-22
Payer: COMMERCIAL

## 2024-05-29 DIAGNOSIS — S42.291A HUMERAL HEAD FRACTURE, RIGHT, CLOSED, INITIAL ENCOUNTER: ICD-10-CM

## 2024-05-29 RX ORDER — DOCUSATE SODIUM 100 MG/1
100 CAPSULE, LIQUID FILLED ORAL 2 TIMES DAILY PRN
Qty: 14 CAPSULE | Refills: 0 | Status: SHIPPED | OUTPATIENT
Start: 2024-05-29 | End: 2024-06-05

## 2024-05-29 RX ORDER — ACETAMINOPHEN 325 MG/1
650 TABLET ORAL EVERY 6 HOURS PRN
Qty: 90 TABLET | Refills: 0 | Status: SHIPPED | OUTPATIENT
Start: 2024-05-29

## 2024-05-29 NOTE — TELEPHONE ENCOUNTER
Rx Refill Request Telephone Encounter    Name:  Jaclyn Potts  :  [id number]  Medication Name/Sig:  tylenol 325mg and docusate           Ms Potts is not utilizing the oxycodone currently. C/O pain, worsening at night. She will discuss with her daughter if they think it is appropriate to take at nighttime. Also, verified patient has not issues taking the tylenol.   Specific Pharmacy location:  updated  Date of Surgery: 2024  Date of last appointment:  surgery  Date of next appointment:  2024  Best number to reach patient:  [phone]     Notified patient okay to take arm out of sling and rest at her side to avoid elbow stiffness.     Nuzhat Webb LPN

## 2024-05-30 NOTE — PATIENT INSTRUCTIONS

## 2024-06-04 ENCOUNTER — OFFICE VISIT (OUTPATIENT)
Dept: ORTHOPEDIC SURGERY | Facility: HOSPITAL | Age: 79
End: 2024-06-04
Payer: COMMERCIAL

## 2024-06-04 ENCOUNTER — HOSPITAL ENCOUNTER (OUTPATIENT)
Dept: RADIOLOGY | Facility: HOSPITAL | Age: 79
Discharge: HOME | End: 2024-06-04
Payer: COMMERCIAL

## 2024-06-04 DIAGNOSIS — S42.221A 2-PART DISPLACED FRACTURE OF SURGICAL NECK OF RIGHT HUMERUS, INITIAL ENCOUNTER FOR CLOSED FRACTURE: ICD-10-CM

## 2024-06-04 PROCEDURE — 73060 X-RAY EXAM OF HUMERUS: CPT | Mod: RT

## 2024-06-04 PROCEDURE — 73030 X-RAY EXAM OF SHOULDER: CPT | Mod: RT

## 2024-06-04 PROCEDURE — 99024 POSTOP FOLLOW-UP VISIT: CPT

## 2024-06-04 PROCEDURE — 1125F AMNT PAIN NOTED PAIN PRSNT: CPT

## 2024-06-04 PROCEDURE — 73060 X-RAY EXAM OF HUMERUS: CPT | Mod: RIGHT SIDE | Performed by: STUDENT IN AN ORGANIZED HEALTH CARE EDUCATION/TRAINING PROGRAM

## 2024-06-04 PROCEDURE — 1159F MED LIST DOCD IN RCRD: CPT

## 2024-06-04 PROCEDURE — 73030 X-RAY EXAM OF SHOULDER: CPT | Mod: RIGHT SIDE | Performed by: STUDENT IN AN ORGANIZED HEALTH CARE EDUCATION/TRAINING PROGRAM

## 2024-06-04 PROCEDURE — 1036F TOBACCO NON-USER: CPT

## 2024-06-04 ASSESSMENT — PAIN - FUNCTIONAL ASSESSMENT: PAIN_FUNCTIONAL_ASSESSMENT: 0-10

## 2024-06-04 ASSESSMENT — PAIN SCALES - GENERAL: PAINLEVEL_OUTOF10: 2

## 2024-06-04 ASSESSMENT — PAIN DESCRIPTION - DESCRIPTORS: DESCRIPTORS: ACHING

## 2024-06-04 NOTE — PROGRESS NOTES
Subjective    Patient ID: Jaclyn Potts is a 78 y.o. female.    Chief Complaint: Post-op of the Right Shoulder and Post-op of the Right Arm     Last Surgery: Open Reduction Internal Fixation Humerus - Right  Last Surgery Date: 5/17/2024    HPI  Patient is a 78 y.o. female who is s/p ORIF of right humerus. Date of surgery was 5/17/2024. Patient continues to be non weight bearing in a sling at this time and denies issues with incision. Patient has not started formal physical therapy. She states that her shoulder and forearm have an aching pain. Patient denies fever or chills, N/T or arm pain.     ROS: All other systems have been reviewed and are negative except as previously noted in history of present illness.      IMP:  Problem List Items Addressed This Visit       2-part displaced fracture of surgical neck of right humerus, initial encounter for closed fracture    Relevant Orders    XR shoulder right 2+ views    XR humerus right     Objective   General: Alert and oriented x 3, NAD, respirations easy and unlabored with no audible wheezes, skin warm and dry, speech and dress appropriate for noted age, affect euthymic.     Musculoskeletal: Right upper extremity  incision well-healed  compartments soft  mild swelling to upper extremity  sensation intact to light touch  motor intact including R/U/M/AIN/PIN nn.  palpable radial pulse 2+     X-ray: Images of right humerus and shoulder reviewed personally by me today and reveal maintenance of alignment of humerus surgical neck fracture with hardware in position and no interval change.      Assessment/Plan   Encounter Diagnoses:  -part displaced fracture of surgical neck of right humerus, initial encounter for closed fracture    PLAN: Patient is s/p ORIF of right humerus. Staples were removed at this visit. Patient overall is doing well. She has been compliant with non weight bearing status of the right arm in the sling. She complains of aching right shoulder and  forearm pain. Patient has not started formal physical therapy. Imaging reveals alignment of humerus surgical neck fracture with hardware in position. Patient is educated that aching pain of the arm is common to occur after surgery and can be cause by inflammation and swelling. Patient is advised to elevate and ice her shoulder to help reduce pain and swelling. Patient will remain non weight bearing on the right arm until August 17th. Patient may perform light ADLs, such as using a pen or pencil, using a coffee mug, or feeding herself. Patient is referred to outpatient physical therapy where they will begin doing wrist, elbow, and shoulder range of motion as tolerated, stretching, and strengthening of the right arm. Patient will follow up in 3 months. Patient is in agreement with this plan. Xrays of the right shoulder and humerus will be needed.     Orders Placed This Encounter    XR shoulder right 2+ views    XR humerus right     No follow-ups on file.

## 2024-06-04 NOTE — LETTER
June 4, 2024     Patient: Jaclyn Potts   YOB: 1945   Date of Visit: 6/4/2024       To Whom It May Concern:    It is my medical opinion that aJclyn Potts should remain working from home as she is non weight bearing on her right arm and is unable to drive to work due to weight bearing status of her right arm.    If you have any questions or concerns, please don't hesitate to call.         Sincerely,        Anum Wu PA-C    CC: No Recipients

## 2024-06-05 ENCOUNTER — DOCUMENTATION (OUTPATIENT)
Dept: ORTHOPEDIC SURGERY | Facility: HOSPITAL | Age: 79
End: 2024-06-05
Payer: COMMERCIAL

## 2024-06-05 NOTE — PROGRESS NOTES
FMLA  received from Jaclyn Potts on 06/04/2024    Jaclyn Potts notified to give office 1-2 business days to complete forms    Completed, E-mailed back to patient, scanned into chart    Special Instructions from pt: Needs completed by June 11     Notified patient to look over forms and notify the office if they need corrected.       Nuzhat Webb LPN

## 2024-07-12 ENCOUNTER — APPOINTMENT (OUTPATIENT)
Dept: PRIMARY CARE | Facility: CLINIC | Age: 79
End: 2024-07-12
Payer: COMMERCIAL

## 2024-07-16 DIAGNOSIS — S42.221A 2-PART DISPLACED FRACTURE OF SURGICAL NECK OF RIGHT HUMERUS, INITIAL ENCOUNTER FOR CLOSED FRACTURE: Primary | ICD-10-CM

## 2024-07-16 RX ORDER — FERROUS SULFATE, DRIED 160(50) MG
1 TABLET, EXTENDED RELEASE ORAL DAILY
Qty: 30 TABLET | Refills: 2 | Status: SHIPPED | OUTPATIENT
Start: 2024-07-16 | End: 2024-10-14

## 2024-08-06 ENCOUNTER — APPOINTMENT (OUTPATIENT)
Dept: ORTHOPEDIC SURGERY | Facility: HOSPITAL | Age: 79
End: 2024-08-06
Payer: COMMERCIAL

## 2024-08-09 DIAGNOSIS — S42.221A 2-PART DISPLACED FRACTURE OF SURGICAL NECK OF RIGHT HUMERUS, INITIAL ENCOUNTER FOR CLOSED FRACTURE: ICD-10-CM

## 2024-08-09 RX ORDER — FERROUS SULFATE, DRIED 160(50) MG
1 TABLET, EXTENDED RELEASE ORAL DAILY
Qty: 30 TABLET | Refills: 2 | Status: SHIPPED | OUTPATIENT
Start: 2024-08-09 | End: 2024-11-07

## 2024-08-09 NOTE — TELEPHONE ENCOUNTER
Rx Refill Request Telephone Encounter    Name:  Jaclyn Potts  :  [id number]  Medication Name/Sig:  Calcium/Vit D  Specific Pharmacy location:  [facility] [address]      Best number to reach patient:  [phone]     Nuzhat Webb LPN

## 2024-08-20 ENCOUNTER — OFFICE VISIT (OUTPATIENT)
Dept: ORTHOPEDIC SURGERY | Facility: HOSPITAL | Age: 79
End: 2024-08-20
Payer: COMMERCIAL

## 2024-08-20 ENCOUNTER — HOSPITAL ENCOUNTER (OUTPATIENT)
Dept: RADIOLOGY | Facility: HOSPITAL | Age: 79
Discharge: HOME | End: 2024-08-20
Payer: COMMERCIAL

## 2024-08-20 DIAGNOSIS — S42.221A 2-PART DISPLACED FRACTURE OF SURGICAL NECK OF RIGHT HUMERUS, INITIAL ENCOUNTER FOR CLOSED FRACTURE: ICD-10-CM

## 2024-08-20 DIAGNOSIS — S42.351D DISPLACED COMMINUTED FRACTURE OF SHAFT OF HUMERUS, RIGHT ARM, SUBSEQUENT ENCOUNTER FOR FRACTURE WITH ROUTINE HEALING: Primary | ICD-10-CM

## 2024-08-20 PROCEDURE — 73060 X-RAY EXAM OF HUMERUS: CPT | Mod: RT

## 2024-08-20 PROCEDURE — 99214 OFFICE O/P EST MOD 30 MIN: CPT | Performed by: ORTHOPAEDIC SURGERY

## 2024-08-20 PROCEDURE — 73060 X-RAY EXAM OF HUMERUS: CPT | Mod: RIGHT SIDE | Performed by: STUDENT IN AN ORGANIZED HEALTH CARE EDUCATION/TRAINING PROGRAM

## 2024-08-20 NOTE — LETTER
August 20, 2024     Patient: Jaclyn Potts   YOB: 1945   Date of Visit: 8/20/2024       To Whom It May Concern:    It is my medical opinion that Jaclyn Potts should remain working from home for the next 6-8 weeks as she is limited weight bearing and limited range of motion with her right arm and is unable to drive to work due her injury    If you have any questions or concerns, please don't hesitate to call.         Sincerely,        Koby Bradley MD    CC: No Recipients

## 2024-08-20 NOTE — PROGRESS NOTES
Subjective    Patient ID: Jaclyn Potts is a 78 y.o. female.    Chief Complaint: No chief complaint on file.     Last Surgery: Open Reduction Internal Fixation Humerus - Right  Last Surgery Date: 5/17/2024    HPI  Patient is a 78 y.o. female who is s/p ORIF of right humerus. Date of surgery was 5/17/2024.  Patient is not in physical therapy making progress.  She reported that they have been focusing on scapular stabilization exercises and passive range of motion.  Patient is disappointed that she does not have full use of her arm years.  She is concerned that her fracture may not be healing.  She does not have pain at baseline however when she lifts her arm to a certain level she has pain.  Her incision is well-healed.  No drainage.  Patient denies fever or chills, N/T or arm pain.     ROS: All other systems have been reviewed and are negative except as previously noted in history of present illness.      IMP:  Problem List Items Addressed This Visit       2-part displaced fracture of surgical neck of right humerus, initial encounter for closed fracture    Relevant Orders    XR humerus right     Other Visit Diagnoses       Displaced comminuted fracture of shaft of humerus, right arm, subsequent encounter for fracture with routine healing    -  Primary          Objective   General: Alert and oriented x 3, NAD, respirations easy and unlabored with no audible wheezes, skin warm and dry, speech and dress appropriate for noted age, affect euthymic.     Musculoskeletal: Right upper extremity  incision well-healed  compartments soft  mild swelling to upper extremity  sensation intact to light touch  motor intact including R/U/M/AIN/PIN nn.  palpable radial pulse 2+   Patient is able to perform active shoulder forward flexion to about 80 degrees and abduction to about 80 degrees also.  Passively I was able to get her to about 150 degrees of forward flexion limited by pain.    X-ray: Images of right humerus and  shoulder reviewed personally by me today and reveal maintenance of alignment of humerus surgical neck fracture with hardware in position and interval fracture healing then callus however fracture line still visible is evidence of osteochondral fracture of the head of the humerus which was present at the time of injury.  The glenohumeral joint is well aligned  Assessment/Plan   Encounter Diagnoses:  Displaced comminuted fracture of shaft of humerus, right arm, subsequent encounter for fracture with routine healing    2-part displaced fracture of surgical neck of right humerus, initial encounter for closed fracture    PLAN: Patient is s/p ORIF of right humerus.  Patient was noted to have osteochondral head impaction fracture of the humerus.  I discussed with her that this could explain some of the pain.  I discussed with her that the fact that she has active range of motion is a good sign that her rotator cuff functioning.  However she will need more therapy to strengthen rotator cuff for additional range of motion.  At this point I discussed with her that her radiograph is stable and there is early sign of fracture healing.  Patient will continue physical therapy.  At this point she will continue range of motion stretching and weightbearing as tolerated.  She will perform strengthening exercises.  Patient reported that she has not been driving yet and has been working from home.  I am okay with her working from home.  At this point she may work with her therapy to allow her to start driving whenever she feels comfortable.  Plan to see her back in 3 months obtain x-ray of the right shoulder 4 view  Orders Placed This Encounter    XR humerus right     No follow-ups on file.

## 2024-08-20 NOTE — LETTER
August 20, 2024     Patient: Jaclyn Potts   YOB: 1945   Date of Visit: 8/20/2024       To Whom It May Concern:    It is my medical opinion that Jaclyn Potts should remain working from home as she is limited weight bearing on her right arm and is unable to drive to work due to weight bearing status of her right arm.    If you have any questions or concerns, please don't hesitate to call.         Sincerely,        Koby Bradley MD    CC: No Recipients

## 2024-09-09 ENCOUNTER — OFFICE VISIT (OUTPATIENT)
Dept: OBSTETRICS AND GYNECOLOGY | Facility: CLINIC | Age: 79
End: 2024-09-09
Payer: COMMERCIAL

## 2024-09-09 VITALS
DIASTOLIC BLOOD PRESSURE: 88 MMHG | BODY MASS INDEX: 37 KG/M2 | HEIGHT: 61 IN | WEIGHT: 196 LBS | SYSTOLIC BLOOD PRESSURE: 148 MMHG

## 2024-09-09 DIAGNOSIS — R82.90 ABNORMAL URINE FINDING: ICD-10-CM

## 2024-09-09 DIAGNOSIS — R30.0 BURNING WITH URINATION: ICD-10-CM

## 2024-09-09 LAB
POC BLOOD, URINE: ABNORMAL
POC GLUCOSE, URINE: NEGATIVE MG/DL
POC LEUKOCYTES, URINE: ABNORMAL
POC NITRITE,URINE: POSITIVE
POC PROTEIN, URINE: ABNORMAL MG/DL

## 2024-09-09 PROCEDURE — 81002 URINALYSIS NONAUTO W/O SCOPE: CPT | Performed by: MIDWIFE

## 2024-09-09 PROCEDURE — 3077F SYST BP >= 140 MM HG: CPT | Performed by: MIDWIFE

## 2024-09-09 PROCEDURE — 87086 URINE CULTURE/COLONY COUNT: CPT

## 2024-09-09 PROCEDURE — 99213 OFFICE O/P EST LOW 20 MIN: CPT | Performed by: MIDWIFE

## 2024-09-09 PROCEDURE — 1036F TOBACCO NON-USER: CPT | Performed by: MIDWIFE

## 2024-09-09 PROCEDURE — 1159F MED LIST DOCD IN RCRD: CPT | Performed by: MIDWIFE

## 2024-09-09 PROCEDURE — 3079F DIAST BP 80-89 MM HG: CPT | Performed by: MIDWIFE

## 2024-09-09 PROCEDURE — 87186 SC STD MICRODIL/AGAR DIL: CPT

## 2024-09-09 RX ORDER — NITROFURANTOIN 25; 75 MG/1; MG/1
100 CAPSULE ORAL 2 TIMES DAILY
Qty: 14 CAPSULE | Refills: 0 | Status: SHIPPED | OUTPATIENT
Start: 2024-09-09 | End: 2024-09-11

## 2024-09-09 NOTE — PROGRESS NOTES
Subjective   Patient ID: Jaclyn Potts is a 79 y.o. female who presents for UTI. Pt c/o dysuria x 3 days  UTI       PMHx: ; AE 2021  SocHx: widowe; works as a CPA  ROS: no pelvic pain, no flank pain, no fever, NAD  Review of Systems    Objective   Physical Exam  Constitutional:       Appearance: Normal appearance.   HENT:      Head: Normocephalic.   Pulmonary:      Effort: Pulmonary effort is normal.   Abdominal:      Tenderness: There is no right CVA tenderness or left CVA tenderness.   Neurological:      Mental Status: She is alert.   Psychiatric:         Behavior: Behavior normal.         Thought Content: Thought content normal.         Assessment/Plan   Diagnoses and all orders for this visit:  Burning with urination  -     POCT UA (nonautomated) manually resulted  -     nitrofurantoin, macrocrystal-monohydrate, (Macrobid) 100 mg capsule; Take 1 capsule (100 mg) by mouth 2 times a day for 7 days.  Abnormal urine finding  -     Urine Culture  -     nitrofurantoin, macrocrystal-monohydrate, (Macrobid) 100 mg capsule; Take 1 capsule (100 mg) by mouth 2 times a day for 7 days.       Exam findings discussed  Pt encouraged to increase WATER intake  RTO AE/PRN    BRIGHT Yuen-EDGAR, ND 24 1:44 PM

## 2024-09-11 DIAGNOSIS — N30.01 ACUTE CYSTITIS WITH HEMATURIA: Primary | ICD-10-CM

## 2024-09-11 RX ORDER — SULFAMETHOXAZOLE AND TRIMETHOPRIM 800; 160 MG/1; MG/1
1 TABLET ORAL 2 TIMES DAILY
Qty: 12 TABLET | Refills: 0 | Status: SHIPPED | OUTPATIENT
Start: 2024-09-11 | End: 2024-09-17

## 2024-09-12 DIAGNOSIS — N39.0 URINARY TRACT INFECTION WITHOUT HEMATURIA, SITE UNSPECIFIED: Primary | ICD-10-CM

## 2024-09-12 LAB — BACTERIA UR CULT: ABNORMAL

## 2024-09-12 RX ORDER — CEPHALEXIN 500 MG/1
500 CAPSULE ORAL 3 TIMES DAILY
Qty: 21 CAPSULE | Refills: 0 | Status: CANCELLED | OUTPATIENT
Start: 2024-09-12 | End: 2024-09-19

## 2024-09-12 RX ORDER — CIPROFLOXACIN 500 MG/1
500 TABLET ORAL 2 TIMES DAILY
Qty: 14 TABLET | Refills: 0 | Status: SHIPPED | OUTPATIENT
Start: 2024-09-12 | End: 2024-09-19

## 2024-09-13 ENCOUNTER — TELEPHONE (OUTPATIENT)
Dept: PRIMARY CARE | Facility: CLINIC | Age: 79
End: 2024-09-13
Payer: COMMERCIAL

## 2024-09-13 NOTE — TELEPHONE ENCOUNTER
"Pt called office to c/o of UTI. States that gyn has been treating. Patient states that GYN has prescribed both nitrofurantoin and cipro and that she is not comfortable taking either. Patient advised that while we recommend taking Rx as prescribed, it is ultimately her choice. Patient stated \"but I have a UTI.\" I advised again that we recommend taking cipro as prescribed. Patient stated that she will not take because atb is banned in [place] and there are too many side effects. Patient advised that she should discuss further with treating physician.   "

## 2024-09-16 DIAGNOSIS — R82.90 ABNORMAL URINE FINDING: ICD-10-CM

## 2024-09-27 ENCOUNTER — LAB (OUTPATIENT)
Dept: LAB | Facility: LAB | Age: 79
End: 2024-09-27
Payer: COMMERCIAL

## 2024-09-27 DIAGNOSIS — Z79.899 POLYPHARMACY: ICD-10-CM

## 2024-09-27 LAB
AMPHETAMINES UR QL SCN: NORMAL
BARBITURATES UR QL SCN: NORMAL
BZE UR QL SCN: NORMAL
CANNABINOIDS UR QL SCN: NORMAL
CREAT UR-MCNC: 73.1 MG/DL (ref 20–320)
PCP UR QL SCN: NORMAL

## 2024-09-27 PROCEDURE — 80307 DRUG TEST PRSMV CHEM ANLYZR: CPT

## 2024-09-27 PROCEDURE — 80368 SEDATIVE HYPNOTICS: CPT

## 2024-09-27 PROCEDURE — 80373 DRUG SCREENING TRAMADOL: CPT

## 2024-09-27 PROCEDURE — 80354 DRUG SCREENING FENTANYL: CPT

## 2024-09-27 PROCEDURE — 80365 DRUG SCREENING OXYCODONE: CPT

## 2024-09-27 PROCEDURE — 80346 BENZODIAZEPINES1-12: CPT

## 2024-09-27 PROCEDURE — 80361 OPIATES 1 OR MORE: CPT

## 2024-09-27 PROCEDURE — 80358 DRUG SCREENING METHADONE: CPT

## 2024-09-27 PROCEDURE — 82570 ASSAY OF URINE CREATININE: CPT

## 2024-10-01 ENCOUNTER — TELEPHONE (OUTPATIENT)
Dept: PRIMARY CARE | Facility: CLINIC | Age: 79
End: 2024-10-01
Payer: COMMERCIAL

## 2024-10-02 DIAGNOSIS — I10 ESSENTIAL HYPERTENSION: ICD-10-CM

## 2024-10-02 DIAGNOSIS — E78.2 MIXED HYPERLIPIDEMIA: Primary | ICD-10-CM

## 2024-10-02 LAB
1OH-MIDAZOLAM UR CFM-MCNC: <25 NG/ML
6MAM UR CFM-MCNC: <25 NG/ML
7AMINOCLONAZEPAM UR CFM-MCNC: <25 NG/ML
A-OH ALPRAZ UR CFM-MCNC: <25 NG/ML
ALPRAZ UR CFM-MCNC: <25 NG/ML
CHLORDIAZEP UR CFM-MCNC: <25 NG/ML
CLONAZEPAM UR CFM-MCNC: <25 NG/ML
CODEINE UR CFM-MCNC: <50 NG/ML
DIAZEPAM UR CFM-MCNC: <25 NG/ML
EDDP UR CFM-MCNC: <25 NG/ML
FENTANYL UR CFM-MCNC: <2.5 NG/ML
HYDROCODONE CTO UR CFM-MCNC: <25 NG/ML
HYDROMORPHONE UR CFM-MCNC: <25 NG/ML
LORAZEPAM UR CFM-MCNC: <25 NG/ML
METHADONE UR CFM-MCNC: <25 NG/ML
MIDAZOLAM UR CFM-MCNC: <25 NG/ML
MORPHINE UR CFM-MCNC: <50 NG/ML
NORDIAZEPAM UR CFM-MCNC: <25 NG/ML
NORFENTANYL UR CFM-MCNC: <2.5 NG/ML
NORHYDROCODONE UR CFM-MCNC: <25 NG/ML
NOROXYCODONE UR CFM-MCNC: <25 NG/ML
NORTRAMADOL UR-MCNC: <50 NG/ML
OXAZEPAM UR CFM-MCNC: <25 NG/ML
OXYCODONE UR CFM-MCNC: <25 NG/ML
OXYMORPHONE UR CFM-MCNC: <25 NG/ML
TEMAZEPAM UR CFM-MCNC: <25 NG/ML
TRAMADOL UR CFM-MCNC: <50 NG/ML
ZOLPIDEM UR CFM-MCNC: >1000 NG/ML
ZOLPIDEM UR-MCNC: 70 NG/ML

## 2024-10-03 ENCOUNTER — TELEPHONE (OUTPATIENT)
Dept: PRIMARY CARE | Facility: CLINIC | Age: 79
End: 2024-10-03
Payer: COMMERCIAL

## 2024-10-03 DIAGNOSIS — F41.9 ANXIETY: ICD-10-CM

## 2024-10-03 RX ORDER — ZOLPIDEM TARTRATE 10 MG/1
10 TABLET ORAL NIGHTLY PRN
Qty: 90 TABLET | Refills: 1 | Status: SHIPPED | OUTPATIENT
Start: 2024-10-03

## 2024-10-10 DIAGNOSIS — S42.221A 2-PART DISPLACED FRACTURE OF SURGICAL NECK OF RIGHT HUMERUS, INITIAL ENCOUNTER FOR CLOSED FRACTURE: ICD-10-CM

## 2024-10-10 RX ORDER — FERROUS SULFATE, DRIED 160(50) MG
1 TABLET, EXTENDED RELEASE ORAL DAILY
Qty: 30 TABLET | Refills: 2 | Status: SHIPPED | OUTPATIENT
Start: 2024-10-10 | End: 2025-01-08

## 2024-10-10 NOTE — TELEPHONE ENCOUNTER
Received fax from [facility] for refill of calcium/vit d    Pended med    Nuzhat Webb LPN

## 2024-10-15 ENCOUNTER — APPOINTMENT (OUTPATIENT)
Dept: RADIOLOGY | Facility: CLINIC | Age: 79
End: 2024-10-15
Payer: COMMERCIAL

## 2024-10-15 ENCOUNTER — LAB (OUTPATIENT)
Dept: LAB | Facility: LAB | Age: 79
End: 2024-10-15
Payer: COMMERCIAL

## 2024-10-15 DIAGNOSIS — I10 ESSENTIAL HYPERTENSION: ICD-10-CM

## 2024-10-15 DIAGNOSIS — R73.03 PREDIABETES: ICD-10-CM

## 2024-10-15 DIAGNOSIS — E78.2 MIXED HYPERLIPIDEMIA: ICD-10-CM

## 2024-10-15 PROBLEM — Z08 ENCOUNTER FOR FOLLOW-UP SURVEILLANCE OF BREAST CANCER: Status: ACTIVE | Noted: 2024-10-15

## 2024-10-15 PROBLEM — Z85.3 ENCOUNTER FOR FOLLOW-UP SURVEILLANCE OF BREAST CANCER: Status: ACTIVE | Noted: 2024-10-15

## 2024-10-15 LAB
ALBUMIN SERPL BCP-MCNC: 4.2 G/DL (ref 3.4–5)
ALP SERPL-CCNC: 78 U/L (ref 33–136)
ALT SERPL W P-5'-P-CCNC: 20 U/L (ref 7–45)
ANION GAP SERPL CALC-SCNC: 14 MMOL/L (ref 10–20)
AST SERPL W P-5'-P-CCNC: 19 U/L (ref 9–39)
BASOPHILS # BLD AUTO: 0.06 X10*3/UL (ref 0–0.1)
BASOPHILS NFR BLD AUTO: 0.5 %
BILIRUB DIRECT SERPL-MCNC: 0.1 MG/DL (ref 0–0.3)
BILIRUB SERPL-MCNC: 0.8 MG/DL (ref 0–1.2)
BUN SERPL-MCNC: 13 MG/DL (ref 6–23)
CALCIUM SERPL-MCNC: 9 MG/DL (ref 8.6–10.6)
CHLORIDE SERPL-SCNC: 101 MMOL/L (ref 98–107)
CO2 SERPL-SCNC: 31 MMOL/L (ref 21–32)
CREAT SERPL-MCNC: 0.69 MG/DL (ref 0.5–1.05)
EGFRCR SERPLBLD CKD-EPI 2021: 90 ML/MIN/1.73M*2
EOSINOPHIL # BLD AUTO: 0.17 X10*3/UL (ref 0–0.4)
EOSINOPHIL NFR BLD AUTO: 1.5 %
ERYTHROCYTE [DISTWIDTH] IN BLOOD BY AUTOMATED COUNT: 14.6 % (ref 11.5–14.5)
GLUCOSE SERPL-MCNC: 132 MG/DL (ref 74–99)
HCT VFR BLD AUTO: 46.9 % (ref 36–46)
HGB BLD-MCNC: 15.6 G/DL (ref 12–16)
IMM GRANULOCYTES # BLD AUTO: 0.07 X10*3/UL (ref 0–0.5)
IMM GRANULOCYTES NFR BLD AUTO: 0.6 % (ref 0–0.9)
LYMPHOCYTES # BLD AUTO: 3 X10*3/UL (ref 0.8–3)
LYMPHOCYTES NFR BLD AUTO: 26.5 %
MCH RBC QN AUTO: 29.3 PG (ref 26–34)
MCHC RBC AUTO-ENTMCNC: 33.3 G/DL (ref 32–36)
MCV RBC AUTO: 88 FL (ref 80–100)
MONOCYTES # BLD AUTO: 0.7 X10*3/UL (ref 0.05–0.8)
MONOCYTES NFR BLD AUTO: 6.2 %
NEUTROPHILS # BLD AUTO: 7.3 X10*3/UL (ref 1.6–5.5)
NEUTROPHILS NFR BLD AUTO: 64.7 %
NRBC BLD-RTO: 0 /100 WBCS (ref 0–0)
PLATELET # BLD AUTO: 277 X10*3/UL (ref 150–450)
POTASSIUM SERPL-SCNC: 4 MMOL/L (ref 3.5–5.3)
PROT SERPL-MCNC: 6.9 G/DL (ref 6.4–8.2)
RBC # BLD AUTO: 5.32 X10*6/UL (ref 4–5.2)
SODIUM SERPL-SCNC: 142 MMOL/L (ref 136–145)
WBC # BLD AUTO: 11.3 X10*3/UL (ref 4.4–11.3)

## 2024-10-15 PROCEDURE — 36415 COLL VENOUS BLD VENIPUNCTURE: CPT

## 2024-10-15 PROCEDURE — 80053 COMPREHEN METABOLIC PANEL: CPT

## 2024-10-15 PROCEDURE — 85025 COMPLETE CBC W/AUTO DIFF WBC: CPT

## 2024-10-15 PROCEDURE — 82248 BILIRUBIN DIRECT: CPT

## 2024-10-15 PROCEDURE — 83036 HEMOGLOBIN GLYCOSYLATED A1C: CPT

## 2024-10-15 NOTE — PROGRESS NOTES
Jaclyn Potts female   1945 79 y.o.   [id number]    Chief Complaint  Follow up annual mammogram and exam, high risk surveillance care.     History Of Present Illness  Jaclyn Potts is a very pleasant 79 y.o. female [facility]. She had screen detected abnormality 2020 core biopsy yielded left breast atypical lobular hyperplasia (ALH). She underwent left Magseed localized excisional biopsy on 11/10/2020, final pathology yielded usual ductal hyperplasia (no atypia). She has family history of breast cancer in her daughter, age 42. She presents today for her annual mammogram and clinical exam. She fractured her right humerus and 7th right rib while in [place]. She came home and was treated by ortho 2024 and had surgery. She is still healing with limited arm mobility and working with physical therapy.    BREAST IMAGING: 10/16/2024 Bilateral screening mammogram, indicates BI-RADS Category 2.    REPRODUCTIVE HISTORY: menarche age 15, , first birth age 20, she did not breastfeed, OCPs x a few years, menopause age unknown (she underwent partial hysterectomy with removal of right ovary at age 32 for gonorrhea infection), no HRT, heterogeneously dense tissue     FAMILY CANCER HISTORY:   Daughter: Breast cancer age 42, no genetic testing  Sister: Lung cancer age 65  Brother: Prostate cancer age 79  Nephew: Melanoma age 40s     Review of Systems  Constitutional:  Negative for appetite change, fatigue, fever and unexpected weight change.   HENT:  Negative for ear pain, hearing loss, nosebleeds, sore throat and trouble swallowing.    Eyes:  Negative for discharge, itching and visual disturbance.   Breast: As stated in HPI.  Respiratory:  Negative for cough, chest tightness and shortness of breath.    Cardiovascular:  Negative for chest pain, palpitations and leg swelling.   Gastrointestinal:  Negative for abdominal pain, constipation, diarrhea and nausea.    Endocrine: Negative for cold intolerance and heat intolerance.   Genitourinary:  Negative for dysuria, frequency, hematuria, pelvic pain and vaginal bleeding.   Musculoskeletal:  Negative for arthralgias, back pain, gait problem, joint swelling and myalgias.   Skin:  Negative for color change and rash.   Allergic/Immunologic: Negative for environmental allergies and food allergies.   Neurological:  Negative for dizziness, tremors, speech difficulty, weakness, numbness and headaches.   Hematological:  Does not bruise/bleed easily.   Psychiatric/Behavioral:  Negative for agitation, dysphoric mood and sleep disturbance. The patient is not nervous/anxious.         Past Medical History  She has a past medical history of Other abnormal and inconclusive findings on diagnostic imaging of breast (12/09/2020), Other specified disorders of breast (12/09/2020), Personal history of other diseases of the circulatory system (12/09/2020), Personal history of other diseases of the respiratory system (11/26/2019), and Squamous cell carcinoma of skin of right lower limb, including hip (12/09/2020).    Surgical History  She has a past surgical history that includes Other surgical history (10/19/2020); Hernia repair (11/30/2020); Total abdominal hysterectomy (12/09/2020); Umbilical hernia repair (12/09/2020); and Rotator cuff repair (11/30/2020).     Family History  Cancer-related family history includes Breast cancer (age of onset: 40) in her daughter; Cancer in her brother and daughter; Lung cancer in her sister.     Social History  She reports that she quit smoking about 14 years ago. Her smoking use included cigarettes. She has never used smokeless tobacco. She reports current alcohol use. She reports that she does not use drugs.    Allergies  Macrobid [nitrofurantoin monohyd/m-cryst], Amoxicillin-pot clavulanate, and Hydrocodone-acetaminophen    Medications  Current Outpatient Medications   Medication Instructions     acetaminophen (TYLENOL) 650 mg, oral, Every 6 hours PRN, For up to 14 days    albuterol (Proventil HFA) 90 mcg/actuation inhaler 2 puffs, inhalation, 4 times daily PRN    albuterol 0.63 mg/3 mL nebulizer solution nebulization    calcium carb,gluc/mag ox,gluc (CALCIUM MAGNESIUM ORAL) 1 tablet, Daily    calcium carbonate-vitamin D3 500 mg-5 mcg (200 unit) tablet 1 tablet, oral, Daily    doxycycline (Adoxa) 100 mg tablet 1 tablet, oral, As needed    ergocalciferol (Vitamin D-2) 1.25 MG (35006 UT) capsule TAKE 1 CAPSULE BY MOUTH ONE TIME PER WEEK FOR 90 DAYS    gabapentin (NEURONTIN) 100 mg, oral, 3 times daily    hydroCHLOROthiazide (HYDRODIURIL) 25 mg, oral, Daily    ipratropium-albuteroL (Duo-Neb) 0.5-2.5 mg/3 mL nebulizer solution 3 mL, nebulization, Every 6 hours PRN    lidocaine (Xylocaine) 2 % solution oral    meloxicam (MOBIC) 15 mg, oral, Daily    multivitamin tablet Take by mouth.    potassium chloride CR 20 mEq ER tablet TAKE 1 TABLET BY MOUTH EVERY DAY WITH FOOD FOR 90 DAYS    tiZANidine (ZANAFLEX) 4 mg, oral, Every 8 hours PRN    triamcinolone (Kenalog) 0.1 % cream Triamcinolone Acetonide 0.1 % External Cream   Quantity: 30  Refills: 0        Start : 9-Jun-2021   Active    zolpidem (AMBIEN) 10 mg, oral, Nightly PRN       Last Recorded Vitals  Blood pressure 138/80, pulse 86, last menstrual period 01/01/1977.    Physical Exam  Chest:         Patient is alert and oriented x3 and in a relaxed and appropriate mood. Her gait is steady and hand grasps are equal. Sclera is clear. The breasts are nearly symmetrical. Left breast has a well healed excisional biopsy incision. The tissue is soft without palpable abnormalities, discrete nodules or masses. The skin and nipples appear normal. There is no cervical, supraclavicular or axillary lymphadenopathy. Heart rate and rhythm normal, S1 and S2 appreciated. The lungs are clear to auscultation bilaterally. Abdomen is soft and non-tender.        Relevant Results and  Imaging  Study Result    Narrative & Impression   Interpreted By:  Leif Mays,   STUDY:  BI MAMMO BILATERAL SCREENING TOMOSYNTHESIS; 10/16/2024 1:58 pm      ACCESSION NUMBER(S):  [id number]      ORDERING CLINICIAN:  ROULA HARRIS      INDICATION:  Screening. History of a left excisional biopsy      ,Z12.31 Encounter for screening mammogram for malignant neoplasm of  breast      COMPARISON:  09/23/2021, 11/18/2022, 11/22/2023      FINDINGS:  2D and tomosynthesis images were reviewed at 1 mm slice thickness.      Density:  There are scattered areas of fibroglandular density.      Postsurgical changes are noted in the upper-outer quadrant the left  breast. No suspicious masses or calcifications are identified.      IMPRESSION:  No mammographic evidence of malignancy.      BI-RADS CATEGORY:  BI-RADS Category:  2 Benign.  Recommendation:  Annual Screening.  Recommended Date:  1 Year.  Laterality:  Bilateral.              For any future breast imaging appointments, please call [phone]).          MACRO:  None      Signed by: Leif Mays 10/16/2024 2:52 PM     Time was spent viewing digital images. I explained the results in depth, along with suggested explanation for follow up recommendations based on the testing results. BI-RADS Category 2      Risk Models  Risk models indicate personal risk of breast cancer in the next 5 years and lifetime (age 85-90):            Orders  Orders Placed This Encounter   Procedures    BI mammo bilateral screening tomosynthesis     Standing Status:   Future     Standing Expiration Date:   11/16/2025     Order Specific Question:   Reason for exam:     Answer:   annual screening mammogram     Order Specific Question:   Radiologist to Determine Optimal Study     Answer:   Yes     Order Specific Question:   Release result to [facility]     Answer:   Immediate     Order Specific Question:   Is this exam part of a Research Study? If Yes, link this order to the research study      Answer:   No     Visit Diagnosis  1. Breast cancer screening, high risk patient  Clinic Appointment Request Follow Up    Clinic Appointment Request Follow Up    BI mammo bilateral screening tomosynthesis      2. Atypical lobular hyperplasia of breast        3. Dense breast tissue            Assessment/Plan  High risk surveillance care, normal clinical exam and imaging, history of left breast ALH, history of benign left breast excisional biopsy, family history breast cancer, heterogeneously dense    Plan:  Return October 2025 for bilateral screening mammogram and exam. Has previously declined endocrine therapy.    Patient Discussion/Summary  Your clinical examination and imaging are normal. Please return in one year for mammogram and office visit or sooner if you have any problems or concerns.     High risk breast surveillance care plan:  Yearly mammogram with digital breast tomosynthesis  Twice yearly clinical breast examinations  Breast MRI (to schedule call [phone])  Monthly self breast examinations &/or regular self breast awareness  Vitamin D3 2000 IU/daily (over the counter) unless your PCP recommends you take a specific dose  Exercise 3-4 times per week for 45-60 minutes  Limit alcohol to 3-4 drinks per week if you are menopausal  Eat healthy low-fat diet with lots of vegetable & fruits  Risk model indicates you are eligible for endocrine therapy with Tamoxifen, Raloxifene or Aromatase inhibitor. Endocrine therapy reduces lifetime risk of breast cancer by up to 50% when taken for 5 years. There is an alternative low dose Tamoxifen which can be taken for only 3 years.      IMPORTANT INFORMATION REGARDING YOUR RESULTS    You can see your health information, review clinical summaries from office visits & test results online when you follow your health with MY  Chart, a personal health record. To sign up go to [email or web address]. If you need assistance with signing up or trouble getting into your  account call [facility] 24/7 at [phone].    My office phone number is [phone] if you need to get in touch with me or have additional questions or concerns. Thank you for choosing [facility] and trusting me as your healthcare provider. I look forward to seeing you again at your next office visit. I am honored to be a provider on your health care team and I remain dedicated to helping you achieve your health goals.    Corina Chavez, APRN-CNP

## 2024-10-16 ENCOUNTER — OFFICE VISIT (OUTPATIENT)
Dept: SURGICAL ONCOLOGY | Facility: CLINIC | Age: 79
End: 2024-10-16
Payer: COMMERCIAL

## 2024-10-16 ENCOUNTER — HOSPITAL ENCOUNTER (OUTPATIENT)
Dept: RADIOLOGY | Facility: CLINIC | Age: 79
Discharge: HOME | End: 2024-10-16
Payer: COMMERCIAL

## 2024-10-16 VITALS — BODY MASS INDEX: 37 KG/M2 | WEIGHT: 195.99 LBS | HEIGHT: 61 IN

## 2024-10-16 VITALS — SYSTOLIC BLOOD PRESSURE: 138 MMHG | HEART RATE: 86 BPM | DIASTOLIC BLOOD PRESSURE: 80 MMHG

## 2024-10-16 DIAGNOSIS — Z12.39 BREAST CANCER SCREENING, HIGH RISK PATIENT: Primary | ICD-10-CM

## 2024-10-16 DIAGNOSIS — R92.30 DENSE BREAST TISSUE: ICD-10-CM

## 2024-10-16 DIAGNOSIS — N60.99 ATYPICAL LOBULAR HYPERPLASIA OF BREAST: ICD-10-CM

## 2024-10-16 DIAGNOSIS — Z12.31 BREAST CANCER SCREENING BY MAMMOGRAM: ICD-10-CM

## 2024-10-16 PROCEDURE — 1126F AMNT PAIN NOTED NONE PRSNT: CPT | Performed by: NURSE PRACTITIONER

## 2024-10-16 PROCEDURE — 1159F MED LIST DOCD IN RCRD: CPT | Performed by: NURSE PRACTITIONER

## 2024-10-16 PROCEDURE — 3075F SYST BP GE 130 - 139MM HG: CPT | Performed by: NURSE PRACTITIONER

## 2024-10-16 PROCEDURE — 77067 SCR MAMMO BI INCL CAD: CPT | Performed by: RADIOLOGY

## 2024-10-16 PROCEDURE — 77063 BREAST TOMOSYNTHESIS BI: CPT | Performed by: RADIOLOGY

## 2024-10-16 PROCEDURE — 99214 OFFICE O/P EST MOD 30 MIN: CPT | Performed by: NURSE PRACTITIONER

## 2024-10-16 PROCEDURE — 1036F TOBACCO NON-USER: CPT | Performed by: NURSE PRACTITIONER

## 2024-10-16 PROCEDURE — 3079F DIAST BP 80-89 MM HG: CPT | Performed by: NURSE PRACTITIONER

## 2024-10-16 PROCEDURE — 77067 SCR MAMMO BI INCL CAD: CPT

## 2024-10-16 ASSESSMENT — PAIN SCALES - GENERAL: PAINLEVEL_OUTOF10: 0-NO PAIN

## 2024-10-17 ENCOUNTER — OFFICE VISIT (OUTPATIENT)
Dept: PRIMARY CARE | Facility: CLINIC | Age: 79
End: 2024-10-17
Payer: COMMERCIAL

## 2024-10-17 VITALS
HEIGHT: 61 IN | DIASTOLIC BLOOD PRESSURE: 60 MMHG | WEIGHT: 196 LBS | HEART RATE: 92 BPM | OXYGEN SATURATION: 98 % | BODY MASS INDEX: 37 KG/M2 | SYSTOLIC BLOOD PRESSURE: 124 MMHG

## 2024-10-17 DIAGNOSIS — Z79.899 POLYPHARMACY: Primary | ICD-10-CM

## 2024-10-17 DIAGNOSIS — F41.9 ANXIETY: ICD-10-CM

## 2024-10-17 DIAGNOSIS — M15.0 PRIMARY OSTEOARTHRITIS INVOLVING MULTIPLE JOINTS: ICD-10-CM

## 2024-10-17 DIAGNOSIS — E66.812 CLASS 2 SEVERE OBESITY WITH SERIOUS COMORBIDITY AND BODY MASS INDEX (BMI) OF 37.0 TO 37.9 IN ADULT, UNSPECIFIED OBESITY TYPE: ICD-10-CM

## 2024-10-17 DIAGNOSIS — K75.81 NASH (NONALCOHOLIC STEATOHEPATITIS): ICD-10-CM

## 2024-10-17 DIAGNOSIS — Z01.89 ENCOUNTER FOR ROUTINE LABORATORY TESTING: ICD-10-CM

## 2024-10-17 DIAGNOSIS — Z00.00 ANNUAL PHYSICAL EXAM: ICD-10-CM

## 2024-10-17 DIAGNOSIS — E66.01 CLASS 2 SEVERE OBESITY WITH SERIOUS COMORBIDITY AND BODY MASS INDEX (BMI) OF 37.0 TO 37.9 IN ADULT, UNSPECIFIED OBESITY TYPE: ICD-10-CM

## 2024-10-17 DIAGNOSIS — E78.2 MIXED HYPERLIPIDEMIA: ICD-10-CM

## 2024-10-17 DIAGNOSIS — R73.03 PREDIABETES: ICD-10-CM

## 2024-10-17 DIAGNOSIS — I10 PRIMARY HYPERTENSION: ICD-10-CM

## 2024-10-17 DIAGNOSIS — E55.9 VITAMIN D DEFICIENCY: ICD-10-CM

## 2024-10-17 DIAGNOSIS — F32.A DEPRESSION, UNSPECIFIED DEPRESSION TYPE: ICD-10-CM

## 2024-10-17 DIAGNOSIS — K21.9 GASTROESOPHAGEAL REFLUX DISEASE WITHOUT ESOPHAGITIS: ICD-10-CM

## 2024-10-17 DIAGNOSIS — D64.9 ANEMIA, UNSPECIFIED TYPE: ICD-10-CM

## 2024-10-17 PROBLEM — S93.431A SYNDESMOTIC DISRUPTION OF RIGHT ANKLE: Status: RESOLVED | Noted: 2023-08-21 | Resolved: 2024-10-17

## 2024-10-17 PROBLEM — Z08 ENCOUNTER FOR FOLLOW-UP SURVEILLANCE OF BREAST CANCER: Status: RESOLVED | Noted: 2024-10-15 | Resolved: 2024-10-17

## 2024-10-17 PROBLEM — S42.221A 2-PART DISPLACED FRACTURE OF SURGICAL NECK OF RIGHT HUMERUS, INITIAL ENCOUNTER FOR CLOSED FRACTURE: Status: RESOLVED | Noted: 2024-05-17 | Resolved: 2024-10-17

## 2024-10-17 PROBLEM — S82.853A: Status: RESOLVED | Noted: 2023-08-21 | Resolved: 2024-10-17

## 2024-10-17 PROBLEM — E78.5 HLD (HYPERLIPIDEMIA): Status: ACTIVE | Noted: 2023-08-21

## 2024-10-17 PROBLEM — M19.90 OA (OSTEOARTHRITIS): Status: ACTIVE | Noted: 2023-08-21

## 2024-10-17 PROBLEM — Z12.39 BREAST CANCER SCREENING, HIGH RISK PATIENT: Status: RESOLVED | Noted: 2023-11-20 | Resolved: 2024-10-17

## 2024-10-17 PROBLEM — Z85.3 ENCOUNTER FOR FOLLOW-UP SURVEILLANCE OF BREAST CANCER: Status: RESOLVED | Noted: 2024-10-15 | Resolved: 2024-10-17

## 2024-10-17 LAB
EST. AVERAGE GLUCOSE BLD GHB EST-MCNC: 140 MG/DL
HBA1C MFR BLD: 6.5 %

## 2024-10-17 PROCEDURE — 3074F SYST BP LT 130 MM HG: CPT | Performed by: STUDENT IN AN ORGANIZED HEALTH CARE EDUCATION/TRAINING PROGRAM

## 2024-10-17 PROCEDURE — 1036F TOBACCO NON-USER: CPT | Performed by: STUDENT IN AN ORGANIZED HEALTH CARE EDUCATION/TRAINING PROGRAM

## 2024-10-17 PROCEDURE — 1160F RVW MEDS BY RX/DR IN RCRD: CPT | Performed by: STUDENT IN AN ORGANIZED HEALTH CARE EDUCATION/TRAINING PROGRAM

## 2024-10-17 PROCEDURE — G2211 COMPLEX E/M VISIT ADD ON: HCPCS | Performed by: STUDENT IN AN ORGANIZED HEALTH CARE EDUCATION/TRAINING PROGRAM

## 2024-10-17 PROCEDURE — 1159F MED LIST DOCD IN RCRD: CPT | Performed by: STUDENT IN AN ORGANIZED HEALTH CARE EDUCATION/TRAINING PROGRAM

## 2024-10-17 PROCEDURE — 1126F AMNT PAIN NOTED NONE PRSNT: CPT | Performed by: STUDENT IN AN ORGANIZED HEALTH CARE EDUCATION/TRAINING PROGRAM

## 2024-10-17 PROCEDURE — 99214 OFFICE O/P EST MOD 30 MIN: CPT | Performed by: STUDENT IN AN ORGANIZED HEALTH CARE EDUCATION/TRAINING PROGRAM

## 2024-10-17 PROCEDURE — 3078F DIAST BP <80 MM HG: CPT | Performed by: STUDENT IN AN ORGANIZED HEALTH CARE EDUCATION/TRAINING PROGRAM

## 2024-10-17 ASSESSMENT — PATIENT HEALTH QUESTIONNAIRE - PHQ9
2. FEELING DOWN, DEPRESSED OR HOPELESS: NOT AT ALL
1. LITTLE INTEREST OR PLEASURE IN DOING THINGS: NOT AT ALL
SUM OF ALL RESPONSES TO PHQ9 QUESTIONS 1 AND 2: 0

## 2024-10-17 ASSESSMENT — ENCOUNTER SYMPTOMS
RESPIRATORY NEGATIVE: 1
GASTROINTESTINAL NEGATIVE: 1
CARDIOVASCULAR NEGATIVE: 1
CONSTITUTIONAL NEGATIVE: 1

## 2024-10-17 ASSESSMENT — PAIN SCALES - GENERAL: PAINLEVEL_OUTOF10: 0-NO PAIN

## 2024-10-17 NOTE — PROGRESS NOTES
[facility]: [facility]  PROGRESS NOTE      Jaclyn Potts is a 79 y.o. female that is presenting today for Follow-up.    Assessment/Plan   - Overall, the patient feels well and denies any acute symptoms / concerns at this time. Has been dealing with recovery / rehab from shoulder repair following an accident while traveling, is making steady progress.  - Blood pressure at goal today.  - Encouraged continued dietary, exercise, and lifestyle modification.  - Significant medication and problem list reconciliation done today.   - Today's appointment is to keep the patient in compliance with their controlled substance agreement (CSA).   - The patient has already signed their CSA at an earlier appointment.  - The patient has completed their urine drug screen (UDS) this year.  - PDMP reviewed and appropriate.  - I have considered the risks of abuse, dependence, addiction, and/or diversion and have discussed these with the patient during our appointment.  - I do believe that the medication(s) are medically necessary. Patient has tried multiple alternatives to controlled substances in the past with limited success.  - The patient's symptoms are well-controlled on current therapy.  - Patient had labwork done for this appointment. Discussed today.  - Patient's sugars somewhat elevated. Will add on a hemoglobin A1c to the blood still in the lab.  - Remainder of labwork looked great.  - Will order labwork for the patient's next appointment. Encouraged the patient to get this labwork done one week prior to the next appointment.    Diagnoses and all orders for this visit:  Polypharmacy  -     Follow Up In Primary Care  Class 2 severe obesity with serious comorbidity and body mass index (BMI) of 37.0 to 37.9 in adult, unspecified obesity type  Gastroesophageal reflux disease without esophagitis  Primary osteoarthritis involving multiple joints  Depression, unspecified depression type  SANON (nonalcoholic  steatohepatitis)  -     Hepatic Function Panel; Future  Anemia, unspecified type  -     CBC and Auto Differential; Future  Mixed hyperlipidemia  -     Lipid Panel; Future  Primary hypertension  -     Basic Metabolic Panel; Future  -     TSH with reflex to Free T4 if abnormal; Future  Vitamin D deficiency  -     Vitamin D 25-Hydroxy,Total (for eval of Vitamin D levels); Future  Prediabetes  -     Hemoglobin A1C; Future  -     Hemoglobin A1C; Future  Anxiety  Encounter for routine laboratory testing  Annual physical exam  -     Follow Up In Primary Care; Future    Current Outpatient Medications   Medication Instructions    acetaminophen (TYLENOL) 650 mg, oral, Every 6 hours PRN, For up to 14 days    albuterol (Proventil HFA) 90 mcg/actuation inhaler 2 puffs, 4 times daily PRN    albuterol 0.63 mg/3 mL nebulizer solution Take by nebulization.    calcium carb,gluc/mag ox,gluc (CALCIUM MAGNESIUM ORAL) 1 tablet, Daily    calcium carbonate-vitamin D3 500 mg-5 mcg (200 unit) tablet 1 tablet, oral, Daily    doxycycline (Adoxa) 100 mg tablet 1 tablet, As needed    ergocalciferol (Vitamin D-2) 1.25 MG (78872 UT) capsule TAKE 1 CAPSULE BY MOUTH ONE TIME PER WEEK FOR 90 DAYS    gabapentin (NEURONTIN) 100 mg, oral, 3 times daily    hydroCHLOROthiazide (HYDRODIURIL) 25 mg, oral, Daily    ipratropium-albuteroL (Duo-Neb) 0.5-2.5 mg/3 mL nebulizer solution 3 mL, nebulization, Every 6 hours PRN    lidocaine (Xylocaine) 2 % solution Take by mouth.    meloxicam (MOBIC) 15 mg, oral, Daily    multivitamin tablet Take by mouth.    potassium chloride CR 20 mEq ER tablet TAKE 1 TABLET BY MOUTH EVERY DAY WITH FOOD FOR 90 DAYS    tiZANidine (ZANAFLEX) 4 mg, oral, Every 8 hours PRN    triamcinolone (Kenalog) 0.1 % cream Triamcinolone Acetonide 0.1 % External Cream   Quantity: 30  Refills: 0        Start : 9-Jun-2021   Active    zolpidem (AMBIEN) 10 mg, oral, Nightly PRN     Subjective   - The patient otherwise feels well and denies any acute  symptoms or concerns at this time.  - The patient denies any changes or progression of their chronic medical problems.  - The patient denies any problems or concerns with their medications.      Review of Systems   Constitutional: Negative.    Respiratory: Negative.     Cardiovascular: Negative.    Gastrointestinal: Negative.    All other systems reviewed and are negative.     Objective   Vitals:    10/17/24 1505   BP: 124/60   Pulse: 92   SpO2: 98%      Body mass index is 37.05 kg/m².  Physical Exam  Vitals and nursing note reviewed.   Constitutional:       General: She is not in acute distress.  Neck:      Vascular: No carotid bruit.   Cardiovascular:      Rate and Rhythm: Normal rate and regular rhythm.      Heart sounds: Normal heart sounds.   Pulmonary:      Effort: Pulmonary effort is normal.      Breath sounds: Normal breath sounds.   Musculoskeletal:         General: No swelling.   Neurological:      Mental Status: She is alert. Mental status is at baseline.   Psychiatric:         Mood and Affect: Mood normal.       Diagnostic Results   Lab Results   Component Value Date    GLUCOSE 132 (H) 10/15/2024    CALCIUM 9.0 10/15/2024     10/15/2024    K 4.0 10/15/2024    CO2 31 10/15/2024     10/15/2024    BUN 13 10/15/2024    CREATININE 0.69 10/15/2024     Lab Results   Component Value Date    ALT 20 10/15/2024    AST 19 10/15/2024    ALKPHOS 78 10/15/2024    BILITOT 0.8 10/15/2024     Lab Results   Component Value Date    WBC 11.3 10/15/2024    HGB 15.6 10/15/2024    HCT 46.9 (H) 10/15/2024    MCV 88 10/15/2024     10/15/2024     Lab Results   Component Value Date    CHOL 168 04/05/2024    CHOL 187 10/03/2023    CHOL 187 07/26/2022     Lab Results   Component Value Date    HDL 36.1 04/05/2024    HDL 41.9 10/03/2023    HDL 40.7 07/26/2022     Lab Results   Component Value Date    LDLCALC 95 04/05/2024    LDLCALC 105 (L) 10/03/2023     Lab Results   Component Value Date    TRIG 187 (H) 04/05/2024  "   TRIG 201 (H) 10/03/2023    TRIG 192 (H) 2022     No components found for: \"CHOLHDL\"  Lab Results   Component Value Date    HGBA1C 6.1 (H) 2024     Other labs not included in the list above were reviewed either before or during this encounter.    History    Past Medical History:   Diagnosis Date    Other abnormal and inconclusive findings on diagnostic imaging of breast 2020    Abnormal mammogram of left breast    Other specified disorders of breast 2020    Seroma of breast    Personal history of other diseases of the circulatory system 2020    History of hypertension    Personal history of other diseases of the respiratory system 2019    History of acute bacterial sinusitis    Squamous cell carcinoma of skin of right lower limb, including hip 2020    Squamous cell carcinoma of skin of right lower extremity     Past Surgical History:   Procedure Laterality Date    HERNIA REPAIR  2020    Hernia Repair    OTHER SURGICAL HISTORY  10/19/2020    Biopsy Skin    ROTATOR CUFF REPAIR  2020    Rotator Cuff Repair    TOTAL ABDOMINAL HYSTERECTOMY  2020    Total Abdominal Hysterectomy    UMBILICAL HERNIA REPAIR  2020    Umbilical Hernia Repair     Family History   Problem Relation Name Age of Onset    Stroke Mother      Diabetes Mother      Alzheimer's disease Father      Lung cancer Sister      No Known Problems Sister      Asthma Brother      Cancer Brother      No Known Problems Brother      Breast cancer Daughter 42 40    Cancer Daughter 42     No Known Problems Daughter       Social History     Socioeconomic History    Marital status:      Spouse name: Not on file    Number of children: Not on file    Years of education: Not on file    Highest education level: Not on file   Occupational History    Not on file   Tobacco Use    Smoking status: Former     Current packs/day: 0.00     Types: Cigarettes     Quit date:      Years since quittin.8    " Smokeless tobacco: Never   Vaping Use    Vaping status: Never Used   Substance and Sexual Activity    Alcohol use: Yes     Comment: rarely    Drug use: Never    Sexual activity: Not on file   Other Topics Concern    Not on file   Social History Narrative    Not on file     Social Drivers of Health     Financial Resource Strain: Not on file   Food Insecurity: Not on file   Transportation Needs: Not on file   Physical Activity: Not on file   Stress: Not on file   Social Connections: Not on file   Intimate Partner Violence: Not on file   Housing Stability: Not on file     Allergies   Allergen Reactions    Macrobid [Nitrofurantoin Monohyd/M-Cryst] Nausea/vomiting     Pt first discovered reaction to this medication on 9/10/24     Amoxicillin-Pot Clavulanate Itching     vomiting    Hydrocodone-Acetaminophen Rash and Itching     Current Outpatient Medications on File Prior to Visit   Medication Sig Dispense Refill    acetaminophen (TylenoL) 325 mg tablet Take 2 tablets (650 mg) by mouth every 6 hours if needed for mild pain (1 - 3) or fever (temp greater than 38.0 C). For up to 14 days 90 tablet 0    albuterol (Proventil HFA) 90 mcg/actuation inhaler Inhale 2 puffs 4 times a day as needed.      albuterol 0.63 mg/3 mL nebulizer solution Take by nebulization.      calcium carb,gluc/mag ox,gluc (CALCIUM MAGNESIUM ORAL) 1 tablet once daily.      calcium carbonate-vitamin D3 500 mg-5 mcg (200 unit) tablet Take 1 tablet by mouth once daily. 30 tablet 2    doxycycline (Adoxa) 100 mg tablet Take 1 tablet (100 mg) by mouth if needed.      ergocalciferol (Vitamin D-2) 1.25 MG (77158 UT) capsule TAKE 1 CAPSULE BY MOUTH ONE TIME PER WEEK FOR 90 DAYS 12 capsule 3    gabapentin (Neurontin) 100 mg capsule Take 1 capsule (100 mg) by mouth 3 times a day. 270 capsule 3    hydroCHLOROthiazide (HYDRODiuril) 25 mg tablet TAKE 1 TABLET BY MOUTH EVERY DAY FOR 90 DAYS 90 tablet 3    ipratropium-albuteroL (Duo-Neb) 0.5-2.5 mg/3 mL nebulizer  solution Take 3 mL by nebulization every 6 hours if needed for wheezing or shortness of breath. 180 mL     lidocaine (Xylocaine) 2 % solution Take by mouth.      meloxicam (Mobic) 15 mg tablet Take 1 tablet (15 mg) by mouth once daily.      multivitamin tablet Take by mouth.      potassium chloride CR 20 mEq ER tablet TAKE 1 TABLET BY MOUTH EVERY DAY WITH FOOD FOR 90 DAYS 90 tablet 3    tiZANidine (Zanaflex) 4 mg tablet Take 1 tablet (4 mg) by mouth every 8 hours if needed for muscle spasms. 60 tablet 1    triamcinolone (Kenalog) 0.1 % cream Triamcinolone Acetonide 0.1 % External Cream   Quantity: 30  Refills: 0        Start : 9-Jun-2021   Active      zolpidem (Ambien) 10 mg tablet Take 1 tablet (10 mg) by mouth as needed at bedtime for sleep. 90 tablet 1     No current facility-administered medications on file prior to visit.     Immunization History   Administered Date(s) Administered    Flu vaccine, trivalent, preservative free, HIGH-DOSE, age 65y+ (Fluzone) 10/29/2016, 09/08/2018, 08/23/2019    Influenza, Seasonal, Quadrivalent, Adjuvanted 10/16/2020, 10/21/2021, 10/15/2022, 10/06/2023    Influenza, injectable, quadrivalent 09/08/2018    Influenza, seasonal, injectable 11/30/2010, 10/01/2015    Pfizer COVID-19 vaccine, 12 years and older, (30mcg/0.3mL) (Comirnaty) 10/06/2023    Pfizer COVID-19 vaccine, bivalent, age 12 years and older (30 mcg/0.3 mL) 10/15/2022    Pfizer Gray Cap SARS-CoV-2 04/30/2022    Pfizer Purple Cap SARS-CoV-2 02/27/2021, 03/27/2021, 10/21/2021    Pneumococcal conjugate vaccine, 13-valent (PREVNAR 13) 10/27/2015, 11/01/2017, 12/01/2017    Pneumococcal polysaccharide vaccine, 23-valent, age 2 years and older (PNEUMOVAX 23) 09/10/2018    RSV, 60 Years And Older (AREXVY) 12/02/2023    Zoster vaccine, recombinant, adult (SHINGRIX) 03/26/2019, 08/24/2019    Zoster, live 10/06/2017     Patient's medical history was reviewed and updated either before or during this encounter.       Gilberto BRAR  MD [place]

## 2024-10-17 NOTE — PATIENT INSTRUCTIONS
- Overall, the patient feels well and denies any acute symptoms / concerns at this time. Has been dealing with recovery / rehab from shoulder repair following an accident while traveling, is making steady progress.  - Blood pressure at goal today.  - Encouraged continued dietary, exercise, and lifestyle modification.  - Significant medication and problem list reconciliation done today.   - Today's appointment is to keep the patient in compliance with their controlled substance agreement (CSA).   - The patient has already signed their CSA at an earlier appointment.  - The patient has completed their urine drug screen (UDS) this year.  - PDMP reviewed and appropriate.  - I have considered the risks of abuse, dependence, addiction, and/or diversion and have discussed these with the patient during our appointment.  - I do believe that the medication(s) are medically necessary. Patient has tried multiple alternatives to controlled substances in the past with limited success.  - The patient's symptoms are well-controlled on current therapy.  - Patient had labwork done for this appointment. Discussed today.  - Will order labwork for the patient's next appointment. Encouraged the patient to get this labwork done one week prior to the next appointment.

## 2024-10-23 ENCOUNTER — APPOINTMENT (OUTPATIENT)
Dept: DERMATOLOGY | Facility: CLINIC | Age: 79
End: 2024-10-23
Payer: COMMERCIAL

## 2024-10-23 DIAGNOSIS — D18.01 CHERRY ANGIOMA: ICD-10-CM

## 2024-10-23 DIAGNOSIS — L57.0 ACTINIC KERATOSIS: ICD-10-CM

## 2024-10-23 DIAGNOSIS — L82.1 SEBORRHEIC KERATOSES: ICD-10-CM

## 2024-10-23 DIAGNOSIS — Z12.83 SKIN CANCER SCREENING: ICD-10-CM

## 2024-10-23 DIAGNOSIS — L57.8 SUN-DAMAGED SKIN: ICD-10-CM

## 2024-10-23 DIAGNOSIS — D22.9 MULTIPLE BENIGN MELANOCYTIC NEVI: ICD-10-CM

## 2024-10-23 DIAGNOSIS — L65.0 TELOGEN EFFLUVIUM: ICD-10-CM

## 2024-10-23 DIAGNOSIS — D48.5 NEOPLASM OF UNCERTAIN BEHAVIOR OF SKIN: Primary | ICD-10-CM

## 2024-10-23 DIAGNOSIS — L90.5 SCAR CONDITIONS AND FIBROSIS OF SKIN: ICD-10-CM

## 2024-10-23 PROCEDURE — 99213 OFFICE O/P EST LOW 20 MIN: CPT | Performed by: DERMATOLOGY

## 2024-10-23 PROCEDURE — 11102 TANGNTL BX SKIN SINGLE LES: CPT | Performed by: DERMATOLOGY

## 2024-10-23 ASSESSMENT — DERMATOLOGY PATIENT ASSESSMENT
WHERE ARE THESE NEW OR CHANGING LESIONS LOCATED: RIGHT LOWER LEG
HAVE YOU HAD OR DO YOU HAVE A STAPH INFECTION: NO
DO YOU HAVE ANY NEW OR CHANGING LESIONS: YES
HAVE YOU HAD OR DO YOU HAVE VASCULAR DISEASE: NO
ARE YOU AN ORGAN TRANSPLANT RECIPIENT: NO
ARE YOU TRYING TO GET PREGNANT: NO
DO YOU HAVE IRREGULAR MENSTRUAL CYCLES: NO
DO YOU USE SUNSCREEN: OCCASIONALLY
ARE YOU ON BIRTH CONTROL: NO
DO YOU USE A TANNING BED: YES, PREVIOUSLY

## 2024-10-23 ASSESSMENT — DERMATOLOGY QUALITY OF LIFE (QOL) ASSESSMENT
RATE HOW BOTHERED YOU ARE BY SYMPTOMS OF YOUR SKIN PROBLEM (EG, ITCHING, STINGING BURNING, HURTING OR SKIN IRRITATION): 0 - NEVER BOTHERED
WHAT SINGLE SKIN CONDITION LISTED BELOW IS THE PATIENT ANSWERING THE QUALITY-OF-LIFE ASSESSMENT QUESTIONS ABOUT: NONE OF THE ABOVE
DATE THE QUALITY-OF-LIFE ASSESSMENT WAS COMPLETED: 67136
ARE THERE EXCLUSIONS OR EXCEPTIONS FOR THE QUALITY OF LIFE ASSESSMENT: NO
RATE HOW BOTHERED YOU ARE BY EFFECTS OF YOUR SKIN PROBLEMS ON YOUR ACTIVITIES (EG, GOING OUT, ACCOMPLISHING WHAT YOU WANT, WORK ACTIVITIES OR YOUR RELATIONSHIPS WITH OTHERS): 0 - NEVER BOTHERED
RATE HOW EMOTIONALLY BOTHERED YOU ARE BY YOUR SKIN PROBLEM (FOR EXAMPLE, WORRY, EMBARRASSMENT, FRUSTRATION): 0 - NEVER BOTHERED

## 2024-10-23 ASSESSMENT — PATIENT GLOBAL ASSESSMENT (PGA): PATIENT GLOBAL ASSESSMENT: PATIENT GLOBAL ASSESSMENT:  1 - CLEAR

## 2024-10-23 ASSESSMENT — ITCH NUMERIC RATING SCALE: HOW SEVERE IS YOUR ITCHING?: 0

## 2024-10-23 NOTE — PROGRESS NOTES
Subjective     Jaclyn Potts is a 79 y.o. female who presents for the following: Skin Check (Left lower leg patient stated she is using chemo cream is not effective; hx of SCC on Right lower leg; nephew had hx of melanoma).     Unfortunately after her last visit when she was in [place], she had a fall and had a proximal right arm break that led her to shortening her trip, surgery, and now prolonged PT to regain strength and mobility- she has been suffering with this healing and PTSD from the trauma of the event as has her daughter who was with her on the trip.     Skin Cancer History  No skin cancer on file.      Review of Systems:  No other skin or systemic complaints other than what is documented elsewhere in the note.    The following portions of the chart were reviewed this encounter and updated as appropriate:       Specialty Problems          Dermatology Problems    Toenail fungus    Squamous cell carcinoma of skin     Dx: Squamous cell carcinoma Location Right leg 9/2015 Treatment: excision     Dx: Squamous cell carcinoma in situ Location: right shin 11/2019 Tx: curettage     Dx: Squamous cell carcinoma in situ Location: right medial calf 11/2019 Tx: curettage          Past Medical History:  Jaclyn Potts  has a past medical history of Other abnormal and inconclusive findings on diagnostic imaging of breast (12/09/2020), Other specified disorders of breast (12/09/2020), Personal history of other diseases of the circulatory system (12/09/2020), Personal history of other diseases of the respiratory system (11/26/2019), and Squamous cell carcinoma of skin of right lower limb, including hip (12/09/2020).    Past Surgical History:  Jaclyn Potts  has a past surgical history that includes Other surgical history (10/19/2020); Hernia repair (11/30/2020); Total abdominal hysterectomy (12/09/2020); Umbilical hernia repair (12/09/2020); and Rotator cuff repair (11/30/2020).    Family History:  Patient  family history includes Alzheimer's disease in her father; Asthma in her brother; Breast cancer (age of onset: 40) in her daughter; Cancer in her brother and daughter; Diabetes in her mother; Lung cancer in her sister; No Known Problems in her brother, daughter, and sister; Stroke in her mother.    Social History:  Jaclyn Potts  reports that she quit smoking about 14 years ago. Her smoking use included cigarettes. She has never used smokeless tobacco. She reports current alcohol use. She reports that she does not use drugs.    Allergies:  Macrobid [nitrofurantoin monohyd/m-cryst], Amoxicillin-pot clavulanate, and Hydrocodone-acetaminophen    Current Medications / CAM's:    Current Outpatient Medications:     acetaminophen (TylenoL) 325 mg tablet, Take 2 tablets (650 mg) by mouth every 6 hours if needed for mild pain (1 - 3) or fever (temp greater than 38.0 C). For up to 14 days, Disp: 90 tablet, Rfl: 0    albuterol (Proventil HFA) 90 mcg/actuation inhaler, Inhale 2 puffs 4 times a day as needed., Disp: , Rfl:     albuterol 0.63 mg/3 mL nebulizer solution, Take by nebulization., Disp: , Rfl:     calcium carb,gluc/mag ox,gluc (CALCIUM MAGNESIUM ORAL), 1 tablet once daily., Disp: , Rfl:     calcium carbonate-vitamin D3 500 mg-5 mcg (200 unit) tablet, Take 1 tablet by mouth once daily., Disp: 30 tablet, Rfl: 2    doxycycline (Adoxa) 100 mg tablet, Take 1 tablet (100 mg) by mouth if needed., Disp: , Rfl:     ergocalciferol (Vitamin D-2) 1.25 MG (12427 UT) capsule, TAKE 1 CAPSULE BY MOUTH ONE TIME PER WEEK FOR 90 DAYS, Disp: 12 capsule, Rfl: 3    gabapentin (Neurontin) 100 mg capsule, Take 1 capsule (100 mg) by mouth 3 times a day., Disp: 270 capsule, Rfl: 3    hydroCHLOROthiazide (HYDRODiuril) 25 mg tablet, TAKE 1 TABLET BY MOUTH EVERY DAY FOR 90 DAYS, Disp: 90 tablet, Rfl: 3    ipratropium-albuteroL (Duo-Neb) 0.5-2.5 mg/3 mL nebulizer solution, Take 3 mL by nebulization every 6 hours if needed for wheezing or  shortness of breath., Disp: 180 mL, Rfl:     lidocaine (Xylocaine) 2 % solution, Take by mouth., Disp: , Rfl:     meloxicam (Mobic) 15 mg tablet, Take 1 tablet (15 mg) by mouth once daily., Disp: , Rfl:     multivitamin tablet, Take by mouth., Disp: , Rfl:     potassium chloride CR 20 mEq ER tablet, TAKE 1 TABLET BY MOUTH EVERY DAY WITH FOOD FOR 90 DAYS, Disp: 90 tablet, Rfl: 3    tiZANidine (Zanaflex) 4 mg tablet, Take 1 tablet (4 mg) by mouth every 8 hours if needed for muscle spasms., Disp: 60 tablet, Rfl: 1    triamcinolone (Kenalog) 0.1 % cream, Triamcinolone Acetonide 0.1 % External Cream  Quantity: 30  Refills: 0      Start : 9-Jun-2021  Active, Disp: , Rfl:     zolpidem (Ambien) 10 mg tablet, Take 1 tablet (10 mg) by mouth as needed at bedtime for sleep., Disp: 90 tablet, Rfl: 1     Objective   Well appearing patient in no apparent distress; mood and affect are within normal limits.    A full examination was performed including scalp, head, eyes, ears, nose, lips, neck, chest, axillae, abdomen, back, buttocks, bilateral upper extremities, bilateral lower extremities, hands, feet, fingers, toes, fingernails, and toenails. Patient declined genital and gluteal cleft exam.  All findings within normal limits unless otherwise noted below.    - scattered regular brown macules and papules    - Scattered waxy tan/grey/brown papules with horn cysts    - scattered small bright red papules and macules    - Well healed scar at prior treatment sites without visual or palpable evidence of recurrence.     - scattered tan macules, telangiectasias, and general photo-damage    Left Lower Leg - Anterior  Pink telangiectatic macule              Scalp  Diffuse thinning with few telogen hairs on pull test         Assessment/Plan   Neoplasm of uncertain behavior of skin  Left Lower Leg - Anterior    Lesion biopsy  Type of biopsy: tangential    Informed consent: discussed and consent obtained    Timeout: patient name, date of birth,  surgical site, and procedure verified    Procedure prep:  Patient was prepped and draped  Anesthesia: the lesion was anesthetized in a standard fashion    Anesthetic:  1% lidocaine w/ epinephrine 1-100,000 local infiltration  Instrument used: DermaBlade    Hemostasis achieved with: aluminum chloride    Outcome: patient tolerated procedure well    Post-procedure details: sterile dressing applied and wound care instructions given    Dressing type: petrolatum and bandage      Staff Communication: Dermatology Local Anesthesia: 1 % Lidocaine / Epinephrine - Amount: 1mL    Specimen 1 - Dermatopathology- DERM LAB  Differential Diagnosis: Actinic keratoses vs superficial BCC- persisting post efudex  Check Margins Yes/No?:    Comments:    Dermpath Lab: Routine Histopathology (formalin-fixed tissue)    Treated with efudex since last visit with some crusting, but no resolution- patient is concerned and after discussion opts for biopsy.     May be postinflammatory erythema vs superficial basal cell carcinoma vs Actinic keratoses     Actinic keratosis    Related Procedures  Follow Up In Dermatology - Established Patient    Multiple benign melanocytic nevi    Benign melanocytic nevi  - Discussed benign nature and that no treatment is necessary unless it becomes painful or increases in size. Patient opts for clinical monitoring at this time.    - Sun protective behavior reviewed and encouraged including the use of over-the-counter sunscreen with SPF30+ daily (reapply every 1.5 hours when outdoors), UPF clothing, broad rimmed hats, sunglasses, and avoidance of midday sun. Home skin monitoring encouraged and how to monitor for skin cancer (changing or new moles, new rapidly growing or non-healing lesions) reviewed. Patient encouraged to call with interval concerns or changes.      Seborrheic keratoses    Seborrheic keratosis (-es)  - Discussed benign nature and that no treatment is necessary unless it becomes painful or increases in  size. Patient opts for clinical monitoring at this time.      Cherry angioma    Cherry angioma(s)  - Discussed benign nature and that no treatment is necessary unless it becomes painful or increases in size. Patient opts for clinical monitoring at this time.      Scar conditions and fibrosis of skin    - Well healed scar(s) at sites of prior skin cancer - Invasive squamous cell carcinoma right lower leg 2020  - Sun protective behavior reviewed and encouraged including the use of over-the-counter sunscreen with SPF30+ daily (reapply every 1.5 hours when outdoors), UPF clothing, broad rimmed hats, sunglasses, and avoidance of midday sun. Home skin monitoring encouraged and how to monitor for skin cancer (changing or new moles, new rapidly growing or non-healing lesions) reviewed. Patient encouraged to call with interval concerns or changes.       Sun-damaged skin    Actinically damaged skin-  - h/o many Actinic keratoses and uses Efudex PRN face, extremities, chest  - Sun protective behavior reviewed and encouraged including the use of over-the-counter sunscreen with SPF30+ daily (reapply every 1.5 hours when outdoors), UPF clothing, broad rimmed hats, sunglasses, and avoidance of midday sun. Home skin monitoring encouraged and how to monitor for skin cancer (changing or new moles, new rapidly growing or non-healing lesions) reviewed. Patient encouraged to call with interval concerns or changes.      - Follows with an , ok to continue    Skin cancer screening    Related Procedures  Follow Up In Dermatology - Established Patient    Telogen effluvium  Scalp    Patient notes significant shed of scalp hair approx 2 months after her fall and arm break/surgery. Shedding is starting to slow now that it has been nearly 5-6 months since the trauma, but she has been significantly affected. She prefers to avoid minoxidil (rogaine).     I reviewed more naturopathic options to support hair regrowth including topical  rosemary oil, PO OTC pumpkin seed oil, supplements such as nutrafol or viviscal. Written recs in AVS given. Reviewed that she should start to notice some new regrowth in the next few months naturally now that shedding has slowed, but she does have some background age (androgenetic) related thinning and she will likely not achieve her full prior volume, but she will definitely improve from her current state.        1 year fbse  Sooner pending biopsy results, renee Brandon MD

## 2024-10-29 LAB
LAB AP ASR DISCLAIMER: NORMAL
LABORATORY COMMENT REPORT: NORMAL
PATH REPORT.FINAL DX SPEC: NORMAL
PATH REPORT.GROSS SPEC: NORMAL
PATH REPORT.MICROSCOPIC SPEC OTHER STN: NORMAL
PATH REPORT.RELEVANT HX SPEC: NORMAL
PATH REPORT.TOTAL CANCER: NORMAL

## 2024-10-31 DIAGNOSIS — D09.9 SQUAMOUS CELL CARCINOMA IN SITU: Primary | ICD-10-CM

## 2024-10-31 RX ORDER — FLUOROURACIL 50 MG/G
CREAM TOPICAL 2 TIMES DAILY
Qty: 40 G | Refills: 0 | Status: SHIPPED | OUTPATIENT
Start: 2024-10-31 | End: 2024-12-12

## 2024-11-01 ENCOUNTER — TELEPHONE (OUTPATIENT)
Dept: DERMATOLOGY | Facility: CLINIC | Age: 79
End: 2024-11-01
Payer: COMMERCIAL

## 2024-11-12 DIAGNOSIS — I10 ESSENTIAL (PRIMARY) HYPERTENSION: ICD-10-CM

## 2024-11-12 RX ORDER — POTASSIUM CHLORIDE 1500 MG/1
20 TABLET, EXTENDED RELEASE ORAL DAILY
Qty: 90 TABLET | Refills: 3 | Status: SHIPPED | OUTPATIENT
Start: 2024-11-12

## 2024-11-19 ENCOUNTER — OFFICE VISIT (OUTPATIENT)
Dept: ORTHOPEDIC SURGERY | Facility: HOSPITAL | Age: 79
End: 2024-11-19
Payer: COMMERCIAL

## 2024-11-19 ENCOUNTER — HOSPITAL ENCOUNTER (OUTPATIENT)
Dept: RADIOLOGY | Facility: HOSPITAL | Age: 79
Discharge: HOME | End: 2024-11-19
Payer: COMMERCIAL

## 2024-11-19 DIAGNOSIS — S42.221A 2-PART DISPLACED FRACTURE OF SURGICAL NECK OF RIGHT HUMERUS, INITIAL ENCOUNTER FOR CLOSED FRACTURE: ICD-10-CM

## 2024-11-19 DIAGNOSIS — M75.41 SUBACROMIAL IMPINGEMENT OF RIGHT SHOULDER: ICD-10-CM

## 2024-11-19 DIAGNOSIS — S42.351D DISPLACED COMMINUTED FRACTURE OF SHAFT OF HUMERUS, RIGHT ARM, SUBSEQUENT ENCOUNTER FOR FRACTURE WITH ROUTINE HEALING: Primary | ICD-10-CM

## 2024-11-19 PROCEDURE — 73030 X-RAY EXAM OF SHOULDER: CPT | Mod: RT

## 2024-11-19 PROCEDURE — 99214 OFFICE O/P EST MOD 30 MIN: CPT | Performed by: ORTHOPAEDIC SURGERY

## 2024-11-19 PROCEDURE — 2500000004 HC RX 250 GENERAL PHARMACY W/ HCPCS (ALT 636 FOR OP/ED): Performed by: ORTHOPAEDIC SURGERY

## 2024-11-19 PROCEDURE — 73030 X-RAY EXAM OF SHOULDER: CPT | Mod: RIGHT SIDE | Performed by: STUDENT IN AN ORGANIZED HEALTH CARE EDUCATION/TRAINING PROGRAM

## 2024-11-19 PROCEDURE — 20610 DRAIN/INJ JOINT/BURSA W/O US: CPT | Mod: RT | Performed by: ORTHOPAEDIC SURGERY

## 2024-11-19 PROCEDURE — 1159F MED LIST DOCD IN RCRD: CPT | Performed by: ORTHOPAEDIC SURGERY

## 2024-11-19 NOTE — LETTER
November 19, 2024     Patient: Jaclyn Potts   YOB: 1945   Date of Visit: 11/19/2024       To Whom It May Concern:    It is my medical opinion that Jaclyn Potts  can begin commuting to work effective 11/20/2024 .    If you have any questions or concerns, please don't hesitate to call.         Sincerely,        Koby Bradley MD    CC: No Recipients

## 2024-12-02 PROCEDURE — 20610 DRAIN/INJ JOINT/BURSA W/O US: CPT | Performed by: ORTHOPAEDIC SURGERY

## 2024-12-02 RX ORDER — TRIAMCINOLONE ACETONIDE 40 MG/ML
40 INJECTION, SUSPENSION INTRA-ARTICULAR; INTRAMUSCULAR
Status: COMPLETED | OUTPATIENT
Start: 2024-12-02 | End: 2024-12-02

## 2024-12-02 RX ORDER — LIDOCAINE HYDROCHLORIDE 10 MG/ML
4 INJECTION, SOLUTION INFILTRATION; PERINEURAL
Status: COMPLETED | OUTPATIENT
Start: 2024-12-02 | End: 2024-12-02

## 2024-12-02 NOTE — PROGRESS NOTES
Subjective    Patient ID: Jaclyn Potts is a 79 y.o. female.    Chief Complaint: Follow-up of the Right Shoulder     Last Surgery: Open Reduction Internal Fixation Humerus - Right  Last Surgery Date: 5/17/2024    Right Shoulder       Patient is a 79 y.o. female who is s/p ORIF of right humerus. Date of surgery was 5/17/2024.  Patient is not in physical therapy making progress.  She reported that they have been focusing on scapular stabilization exercises and passive range of motion.  Patient is making progress in physical therapy.  Patient has pain at extreme overhead range of motion.  She believes she is making some progress although it feels slow to her.  Her incision is well-healed.  No drainage.  Patient denies fever or chills, N/T or arm pain.     ROS: All other systems have been reviewed and are negative except as previously noted in history of present illness.      IMP:  Problem List Items Addressed This Visit    None  Visit Diagnoses       Displaced comminuted fracture of shaft of humerus, right arm, subsequent encounter for fracture with routine healing    -  Primary    2-part displaced fracture of surgical neck of right humerus, initial encounter for closed fracture        Relevant Orders    XR shoulder right 2+ views (Completed)    Disability Placard    Subacromial impingement of right shoulder              Objective   General: Alert and oriented x 3, NAD, respirations easy and unlabored with no audible wheezes, skin warm and dry, speech and dress appropriate for noted age, affect euthymic.     Musculoskeletal: Right upper extremity  incision well-healed  compartments soft  mild swelling to upper extremity  sensation intact to light touch  motor intact including R/U/M/AIN/PIN nn.  palpable radial pulse 2+   Patient is able to perform active shoulder forward flexion to about 90 degrees and abduction to about 70 degrees also.  Passively I was able to get her to about 150 degrees of forward flexion  laying down limited by pain.    X-ray: Images of right humerus and shoulder reviewed personally by me today and reveal maintenance of alignment of humerus surgical neck fracture with hardware in position and interval fracture healing and callus formation, evidence of osteochondral fracture of the head of the humerus which was present at the time of injury.  The glenohumeral joint is well aligned  Assessment/Plan   Encounter Diagnoses:  Displaced comminuted fracture of shaft of humerus, right arm, subsequent encounter for fracture with routine healing    2-part displaced fracture of surgical neck of right humerus, initial encounter for closed fracture    Subacromial impingement of right shoulder    PLAN: Patient is s/p ORIF of right humerus.  Patient was noted to have osteochondral head impaction fracture of the humerus.  Patient is making progress however she does have stiffness and subacromial impingement symptoms.  I recommend continued physical therapy range of motion stretching exercises without restrictions.  I discussed with her that the impaction fracture of the humeral head places at risk of increased arthrosis of the shoulder.  In order to help with pain and improve function I discussed performing subacromial corticosteroid injection.  We discussed risk, benefit and alternatives, she understood and wished to proceed.  Injection was performed and she tolerated well.  She will continue outpatient therapy.  Plan to see her back in 3 months obtain x-ray of the right shoulder 4 view  Patient ID: Jaclyn Potts is a 79 y.o. female.    L Inj/Asp: R subacromial bursa on 12/2/2024 5:14 PM  Indications: pain  Details: 21 G needle, lateral approach  Medications: 40 mg triamcinolone acetonide 40 mg/mL; 4 mL lidocaine 10 mg/mL (1 %)  Procedure, treatment alternatives, risks and benefits explained, specific risks discussed. Consent was given by the patient. Immediately prior to procedure a time out was called to  verify the correct patient, procedure, equipment, support staff and site/side marked as required. Patient was prepped and draped in the usual sterile fashion.         Orders Placed This Encounter    Disability Placard    XR shoulder right 2+ views     No follow-ups on file.

## 2024-12-17 ENCOUNTER — OFFICE VISIT (OUTPATIENT)
Dept: DERMATOLOGY | Facility: CLINIC | Age: 79
End: 2024-12-17
Payer: COMMERCIAL

## 2024-12-17 DIAGNOSIS — L71.9 ROSACEA: Primary | ICD-10-CM

## 2024-12-17 DIAGNOSIS — H00.012 HORDEOLUM EXTERNUM OF RIGHT LOWER EYELID: ICD-10-CM

## 2024-12-17 DIAGNOSIS — D09.9 SQUAMOUS CELL CARCINOMA IN SITU: ICD-10-CM

## 2024-12-17 LAB — POC KOH - DERM RESULT 1: NEGATIVE

## 2024-12-17 RX ORDER — DOXYCYCLINE 100 MG/1
CAPSULE ORAL
Qty: 60 CAPSULE | Refills: 0 | Status: SHIPPED | OUTPATIENT
Start: 2024-12-17

## 2024-12-17 RX ORDER — METRONIDAZOLE 7.5 MG/G
CREAM TOPICAL
Qty: 45 G | Refills: 11 | Status: SHIPPED | OUTPATIENT
Start: 2024-12-17

## 2024-12-17 ASSESSMENT — DERMATOLOGY PATIENT ASSESSMENT
ARE YOU AN ORGAN TRANSPLANT RECIPIENT: NO
DO YOU USE SUNSCREEN: OCCASIONALLY
ARE YOU TRYING TO GET PREGNANT: NO
WHERE ARE THESE NEW OR CHANGING LESIONS LOCATED: ON FACE
HAVE YOU HAD OR DO YOU HAVE A STAPH INFECTION: NO
ARE YOU ON BIRTH CONTROL: NO
DO YOU HAVE ANY NEW OR CHANGING LESIONS: YES
HAVE YOU HAD OR DO YOU HAVE VASCULAR DISEASE: NO
DO YOU USE A TANNING BED: NO
DO YOU HAVE IRREGULAR MENSTRUAL CYCLES: NO

## 2024-12-17 ASSESSMENT — DERMATOLOGY QUALITY OF LIFE (QOL) ASSESSMENT
RATE HOW EMOTIONALLY BOTHERED YOU ARE BY YOUR SKIN PROBLEM (FOR EXAMPLE, WORRY, EMBARRASSMENT, FRUSTRATION): 3
WHAT SINGLE SKIN CONDITION LISTED BELOW IS THE PATIENT ANSWERING THE QUALITY-OF-LIFE ASSESSMENT QUESTIONS ABOUT: NONE OF THE ABOVE
RATE HOW BOTHERED YOU ARE BY SYMPTOMS OF YOUR SKIN PROBLEM (EG, ITCHING, STINGING BURNING, HURTING OR SKIN IRRITATION): 1
RATE HOW BOTHERED YOU ARE BY EFFECTS OF YOUR SKIN PROBLEMS ON YOUR ACTIVITIES (EG, GOING OUT, ACCOMPLISHING WHAT YOU WANT, WORK ACTIVITIES OR YOUR RELATIONSHIPS WITH OTHERS): 1
ARE THERE EXCLUSIONS OR EXCEPTIONS FOR THE QUALITY OF LIFE ASSESSMENT: NO

## 2024-12-17 ASSESSMENT — ITCH NUMERIC RATING SCALE: HOW SEVERE IS YOUR ITCHING?: 0

## 2024-12-17 ASSESSMENT — PATIENT GLOBAL ASSESSMENT (PGA): PATIENT GLOBAL ASSESSMENT: PATIENT GLOBAL ASSESSMENT:  1 - CLEAR

## 2024-12-17 NOTE — PROGRESS NOTES
Subjective     Jaclyn Potts is a 79 y.o. female who presents for the following: Rash (Rash on face that started last Tuesday 12/10 as welts and fluid filled pustules. She was on a cruise at the time.. Jaclyn reports initially the rash was painful. She shared she did use a new sunscreen SPF 50. She used a light collagen mask last night that helped calm her face down.).     Skin Cancer History  Biopsy Date Type Location Status   10/23/24 SCC in Situ Left Lower Leg - Anterior Patient/Caregiver Informed       Review of Systems:  No other skin or systemic complaints other than what is documented elsewhere in the note.    The following portions of the chart were reviewed this encounter and updated as appropriate:       Specialty Problems          Dermatology Problems    Toenail fungus    Squamous cell carcinoma of skin     Dx: Squamous cell carcinoma Location Right leg 9/2015 Treatment: excision     Dx: Squamous cell carcinoma in situ Location: right shin 11/2019 Tx: curettage     Dx: Squamous cell carcinoma in situ Location: right medial calf 11/2019 Tx: curettage          Past Medical History:  Jaclyn Potts  has a past medical history of Other abnormal and inconclusive findings on diagnostic imaging of breast (12/09/2020), Other specified disorders of breast (12/09/2020), Personal history of other diseases of the circulatory system (12/09/2020), Personal history of other diseases of the respiratory system (11/26/2019), and Squamous cell carcinoma of skin of right lower limb, including hip (12/09/2020).    Past Surgical History:  Jaclyn Potts  has a past surgical history that includes Other surgical history (10/19/2020); Hernia repair (11/30/2020); Total abdominal hysterectomy (12/09/2020); Umbilical hernia repair (12/09/2020); and Rotator cuff repair (11/30/2020).    Family History:  Patient family history includes Alzheimer's disease in her father; Asthma in her brother; Breast cancer (age of  onset: 40) in her daughter; Cancer in her brother and daughter; Diabetes in her mother; Lung cancer in her sister; No Known Problems in her brother, daughter, and sister; Stroke in her mother.    Social History:  Jaclyn Potts  reports that she quit smoking about 14 years ago. Her smoking use included cigarettes. She has never used smokeless tobacco. She reports current alcohol use. She reports that she does not use drugs.    Allergies:  Macrobid [nitrofurantoin monohyd/m-cryst], Amoxicillin-pot clavulanate, and Hydrocodone-acetaminophen    Current Medications / CAM's:    Current Outpatient Medications:     acetaminophen (TylenoL) 325 mg tablet, Take 2 tablets (650 mg) by mouth every 6 hours if needed for mild pain (1 - 3) or fever (temp greater than 38.0 C). For up to 14 days, Disp: 90 tablet, Rfl: 0    albuterol (Proventil HFA) 90 mcg/actuation inhaler, Inhale 2 puffs 4 times a day as needed., Disp: , Rfl:     albuterol 0.63 mg/3 mL nebulizer solution, Take by nebulization., Disp: , Rfl:     calcium carb,gluc/mag ox,gluc (CALCIUM MAGNESIUM ORAL), 1 tablet once daily., Disp: , Rfl:     calcium carbonate-vitamin D3 500 mg-5 mcg (200 unit) tablet, Take 1 tablet by mouth once daily., Disp: 30 tablet, Rfl: 2    doxycycline (Adoxa) 100 mg tablet, Take 1 tablet (100 mg) by mouth if needed., Disp: , Rfl:     ergocalciferol (Vitamin D-2) 1.25 MG (36222 UT) capsule, TAKE 1 CAPSULE BY MOUTH ONE TIME PER WEEK FOR 90 DAYS, Disp: 12 capsule, Rfl: 3    gabapentin (Neurontin) 100 mg capsule, Take 1 capsule (100 mg) by mouth 3 times a day., Disp: 270 capsule, Rfl: 3    hydroCHLOROthiazide (HYDRODiuril) 25 mg tablet, TAKE 1 TABLET BY MOUTH EVERY DAY FOR 90 DAYS, Disp: 90 tablet, Rfl: 3    ipratropium-albuteroL (Duo-Neb) 0.5-2.5 mg/3 mL nebulizer solution, Take 3 mL by nebulization every 6 hours if needed for wheezing or shortness of breath., Disp: 180 mL, Rfl:     lidocaine (Xylocaine) 2 % solution, Take by mouth., Disp: ,  Rfl:     meloxicam (Mobic) 15 mg tablet, Take 1 tablet (15 mg) by mouth once daily., Disp: , Rfl:     multivitamin tablet, Take by mouth., Disp: , Rfl:     potassium chloride CR 20 mEq ER tablet, TAKE 1 TABLET BY MOUTH EVERY DAY WITH FOOD, Disp: 90 tablet, Rfl: 3    tiZANidine (Zanaflex) 4 mg tablet, Take 1 tablet (4 mg) by mouth every 8 hours if needed for muscle spasms., Disp: 60 tablet, Rfl: 1    triamcinolone (Kenalog) 0.1 % cream, Triamcinolone Acetonide 0.1 % External Cream  Quantity: 30  Refills: 0      Start : 9-Jun-2021  Active, Disp: , Rfl:     zolpidem (Ambien) 10 mg tablet, Take 1 tablet (10 mg) by mouth as needed at bedtime for sleep., Disp: 90 tablet, Rfl: 1    doxycycline (Monodox) 100 mg capsule, Take twice daily. Take with at least 8 ounces (large glass) of water, do not lie down for 30 minutes after, Disp: 60 capsule, Rfl: 0    metroNIDAZOLE (Metrocream) 0.75 % cream, Apply to face twice daily, Disp: 45 g, Rfl: 11     Objective   Well appearing patient in no apparent distress; mood and affect are within normal limits.    A full examination of face and eyes:     Head - Anterior (Face)  Scattered pink to red papules, some with central pustule on forehead, cheeks, nose, chin; background erythema and telangiectasias    Right Lower Eyelid  Erythematous edematous papule on right lower lateral conjunctival margin    Left Lower Leg - Anterior  Depressed white/pink papule at prior biopsy site       Assessment/Plan   Rosacea  Head - Anterior (Face)    Rosacea- acute papulopustular flare after using new sunscreen on cruise ship; she has likely always had mild erythematotelangeictatic rosacea that is well managed with her gentle skin care, now with pustular flare  - KOH of pustule on left cheek without demodicosis noted  - This is a chronic condition that we cannot cure. There are treatments to help control the condition but they often need be used regularly.   - Patients with rosacea have sensitive skin. I  recommend always choosing products meant for sensitive skin that are fragrance and dye free  - Strict sun protection is helpful to prevent worsening of the redness component of rosacea. I recommend noncomedogenic SPF 30+ broadspectrum physical blocker sunscreens such as EltaMD UV Clear  - START metronidazole 0.75% cream twice per day to affected areas on face  - she may have ocular component as well, given severity of flare + eye component will also treat with month of doxycycline; she tolerates this med well  - START doxycycline 100mg bid x 1 month, take with non-dairy food an dwater. GI upset, photosensitivity, and allergic reactions are common side effects .      doxycycline (Monodox) 100 mg capsule - Head - Anterior (Face)  Take twice daily. Take with at least 8 ounces (large glass) of water, do not lie down for 30 minutes after    metroNIDAZOLE (Metrocream) 0.75 % cream - Head - Anterior (Face)  Apply to face twice daily    Hordeolum externum of right lower eyelid  Right Lower Eyelid    - nature reviewed, will treat with doxycycline per above  - continue warm compressed PRN, she noted these helped with onset about 10-12 days ago    Squamous cell carcinoma in situ  Left Lower Leg - Anterior    Biopsy proven squamous cell carcinoma in situ from last FBSE without visual or palpable residual noted today, she is 1 week into using efudex BID on it with some surrounding mild erythema. She will finish course of BID use x 4 weeks total, then allow to heal for her 4/2025 visit with me to recheck the site and see if that was adequate or we should consider curettage or MSO. Patient agreeable and will cancel her 1/8/2025 currently sched Mso for the spot       Fuv as sched 4/2025 for fbse and check left lower leg SCCIS s/p her 4 weeks of efudex-- she is 1 week into it.     Anastasia Brandon MD

## 2025-01-08 ENCOUNTER — APPOINTMENT (OUTPATIENT)
Dept: DERMATOLOGY | Facility: CLINIC | Age: 80
End: 2025-01-08
Payer: COMMERCIAL

## 2025-01-24 ENCOUNTER — TELEPHONE (OUTPATIENT)
Dept: PRIMARY CARE | Facility: CLINIC | Age: 80
End: 2025-01-24
Payer: COMMERCIAL

## 2025-01-24 DIAGNOSIS — E55.9 VITAMIN D DEFICIENCY: ICD-10-CM

## 2025-01-24 DIAGNOSIS — Z79.899 POLYPHARMACY: ICD-10-CM

## 2025-01-24 DIAGNOSIS — M15.0 PRIMARY OSTEOARTHRITIS INVOLVING MULTIPLE JOINTS: ICD-10-CM

## 2025-01-24 NOTE — TELEPHONE ENCOUNTER
Information your provider wants you to know    Your opinion matters! Thank you for choosing Dr. Danielle Faith at [facility].You might receive a survey in the mail or via e-mail about your experience today to evaluate our clinic. Please fill out and return it in the pre-paid envelope. It was a pleasure to care for you today!    Please include comments and feedback to our office on how we can improve.     Clinic Policy:    Cancellation and No Show: If you must cancel a visit, please give 24 hours notice so we can accommodate other patients waiting to be seen. As a courtesy our automated system will place a reminder call to you 48 hours prior to your appointment time. Any appointment cancelled less than 2 hours prior to start time will be considered a “No Show”.  You can cancel your appointment by calling our office at [phone] anytime or online via your [employer] account. Our office is usually closed on Fridays.     Forms (FMLA/ Disability):    Please complete your portion of any forms prior to bringing them into the office. This includes adding a telephone number where you can be reached during normal business hours. Please allow 7-14 days for any form to be completed. Some FMLA forms will need appointment to be completed.     Medication Requests:    Please plan ahead. We need up to 2 business days notice for refills to be completed. Please contact your preferred pharmacy for your refills. The pharmacy will contact the office for the refills.     Messages:    Message left for Dr. Danielle Faith will be returned within 24 business hours or sooner.     Test results:    Our goal is to report all test results ordered by Dr. Danielle Faith within 7-14 days. If you do not receive your test results either by phone or [employer] message within 14 days after your visit with our office, please call our office at [phone] and ask to speak with a member of our care team or send your care team  Refill request to [facility]:  Gabapentin   Vitamin D   a message via your [employer] account.     Referrals to GI Department:     The GI department's main office is located at [facility]. All appointment scheduling occurs at this location and the phone number is [phone]. The GI department is doing all procedures in [place] until further notice.

## 2025-01-28 RX ORDER — GABAPENTIN 100 MG/1
100 CAPSULE ORAL 3 TIMES DAILY
Qty: 270 CAPSULE | Refills: 3 | Status: SHIPPED | OUTPATIENT
Start: 2025-01-28 | End: 2026-01-28

## 2025-01-28 RX ORDER — ERGOCALCIFEROL 1.25 MG/1
50000 CAPSULE ORAL WEEKLY
Qty: 12 CAPSULE | Refills: 3 | Status: SHIPPED | OUTPATIENT
Start: 2025-01-28

## 2025-02-07 DIAGNOSIS — I10 ESSENTIAL HYPERTENSION: ICD-10-CM

## 2025-02-10 RX ORDER — HYDROCHLOROTHIAZIDE 25 MG/1
25 TABLET ORAL DAILY
Qty: 90 TABLET | Refills: 1 | Status: SHIPPED | OUTPATIENT
Start: 2025-02-10

## 2025-02-18 ENCOUNTER — HOSPITAL ENCOUNTER (OUTPATIENT)
Dept: RADIOLOGY | Facility: HOSPITAL | Age: 80
Discharge: HOME | End: 2025-02-18
Payer: COMMERCIAL

## 2025-02-18 ENCOUNTER — OFFICE VISIT (OUTPATIENT)
Dept: ORTHOPEDIC SURGERY | Facility: HOSPITAL | Age: 80
End: 2025-02-18
Payer: COMMERCIAL

## 2025-02-18 DIAGNOSIS — M67.911 ROTATOR CUFF DYSFUNCTION, RIGHT: ICD-10-CM

## 2025-02-18 DIAGNOSIS — S42.221A 2-PART DISPLACED FRACTURE OF SURGICAL NECK OF RIGHT HUMERUS, INITIAL ENCOUNTER FOR CLOSED FRACTURE: ICD-10-CM

## 2025-02-18 DIAGNOSIS — M75.41 SUBACROMIAL IMPINGEMENT OF RIGHT SHOULDER: Primary | ICD-10-CM

## 2025-02-18 PROCEDURE — 99214 OFFICE O/P EST MOD 30 MIN: CPT | Performed by: ORTHOPAEDIC SURGERY

## 2025-02-18 PROCEDURE — 73030 X-RAY EXAM OF SHOULDER: CPT | Mod: RT

## 2025-02-18 PROCEDURE — 73030 X-RAY EXAM OF SHOULDER: CPT | Mod: RIGHT SIDE | Performed by: STUDENT IN AN ORGANIZED HEALTH CARE EDUCATION/TRAINING PROGRAM

## 2025-02-18 NOTE — PROGRESS NOTES
Subjective    Patient ID: Jaclyn Potts is a 79 y.o. female.    Chief Complaint: Follow-up of the Right Shoulder     Last Surgery: Open Reduction Internal Fixation Humerus - Right  Last Surgery Date: 5/17/2024    Patient is a 79 y.o. female who is s/p ORIF of right humerus. Date of surgery was 5/17/2024.  Patient complaints of some pain and rates it 1 out of 10. She is more bothered by her limited mobility and stiffness. She is in physical therapy making progress.  She reported that they have been focusing on scapular stabilization and rotator cuff exercises and passive range of motion.  Patient is making progress in physical therapy.  Patient has pain at extreme overhead range of motion. She states when she is lying down she has full ROM vs weakness when she is standing up and doing exercise against gravity.  .   Patient denies fever or chills, N/T or arm pain.     She also complaints of right ankle swelling and pain.     ROS: All other systems have been reviewed and are negative except as previously noted in history of present illness.      IMP:  Problem List Items Addressed This Visit    None  Visit Diagnoses       Subacromial impingement of right shoulder    -  Primary    2-part displaced fracture of surgical neck of right humerus, initial encounter for closed fracture        Relevant Orders    XR shoulder right 2+ views    MR shoulder right wo IV contrast    Referral to Physical Therapy    Rotator cuff dysfunction, right              Objective   General: Alert and oriented x 3, NAD, respirations easy and unlabored with no audible wheezes, skin warm and dry, speech and dress appropriate for noted age, affect euthymic.     Musculoskeletal: Right upper extremity  incision well-healed  compartments soft  mild swelling to upper extremity  sensation intact to light touch  motor intact including R/U/M/AIN/PIN nn.  palpable radial pulse 2+   Patient is able to perform active shoulder forward flexion to about 80  degrees and abduction to about 80 degrees also.  Passively I was able to get her to about 150 degrees of forward flexion laying down limited by pain.  Internal rotation to L1.    X-ray: Images of right humerus and shoulder reviewed personally by me today and reveal maintenance of alignment of humerus surgical neck fracture with hardware in position and interval fracture healing and callus formation, evidence of osteochondral fracture of the head of the humerus which was present at the time of injury but healed.  The glenohumeral joint is well aligned.    Assessment/Plan   Encounter Diagnoses:  Subacromial impingement of right shoulder    2-part displaced fracture of surgical neck of right humerus, initial encounter for closed fracture    Rotator cuff dysfunction, right    PLAN: Patient is s/p ORIF of right humerus.  Patient was noted to have osteochondral head impaction fracture of the humerus.  Patient is making progress however she does have stiffness and subacromial impingement symptoms and rotator weakness. She denies any pain but is bothered by the stiffness and limited mobility. We discussed we will get an MRI of her shoulder to evaluate her rotator cuff. MRI was ordered and will follow up after complete. I recommend continued physical therapy range of motion stretching exercises without restrictions. Request repeat subacromial corticosteroid injection as she did have some relief with it previously.  We discussed risk, benefit and alternatives, she understood and will proceed at her next visit.   Patient also had hx of ankle fracture that I treated and is presently noting pain and swelling, we discussed during her next visit we will get X-rays of right ankle and review her MRI results. We will also possibly do shoulder and ankle cortisone injections.  Patient is in agreement with this plan.     Orders Placed This Encounter    XR shoulder right 2+ views    MR shoulder right wo IV contrast    Referral to  Physical Therapy     No follow-ups on file.    Scribe Attestation  By signing my name below, I, Koby Bradley MD, Scribe attest that this documentation has been prepared under the direction and in the presence of Koby Bradley MD. All medical record entries made by the Scribe were at my direction or personally dictated by me. I have reviewed the chart and agree that the record accurately reflects my personal performance of the history, physical exam, discussion and plan.

## 2025-02-22 ENCOUNTER — HOSPITAL ENCOUNTER (OUTPATIENT)
Dept: RADIOLOGY | Facility: HOSPITAL | Age: 80
Discharge: HOME | End: 2025-02-22
Payer: COMMERCIAL

## 2025-02-22 DIAGNOSIS — S42.221A 2-PART DISPLACED FRACTURE OF SURGICAL NECK OF RIGHT HUMERUS, INITIAL ENCOUNTER FOR CLOSED FRACTURE: ICD-10-CM

## 2025-02-22 PROCEDURE — 73221 MRI JOINT UPR EXTREM W/O DYE: CPT | Mod: RIGHT SIDE | Performed by: STUDENT IN AN ORGANIZED HEALTH CARE EDUCATION/TRAINING PROGRAM

## 2025-02-22 PROCEDURE — 73221 MRI JOINT UPR EXTREM W/O DYE: CPT | Mod: RT

## 2025-02-25 ENCOUNTER — OFFICE VISIT (OUTPATIENT)
Dept: ORTHOPEDIC SURGERY | Facility: HOSPITAL | Age: 80
End: 2025-02-25
Payer: COMMERCIAL

## 2025-02-25 DIAGNOSIS — S46.011A TRAUMATIC INCOMPLETE TEAR OF RIGHT ROTATOR CUFF, INITIAL ENCOUNTER: ICD-10-CM

## 2025-02-25 DIAGNOSIS — S42.221A 2-PART DISPLACED FRACTURE OF SURGICAL NECK OF RIGHT HUMERUS, INITIAL ENCOUNTER FOR CLOSED FRACTURE: ICD-10-CM

## 2025-02-25 DIAGNOSIS — M75.41 SUBACROMIAL IMPINGEMENT OF RIGHT SHOULDER: Primary | ICD-10-CM

## 2025-02-25 DIAGNOSIS — S42.351D DISPLACED COMMINUTED FRACTURE OF SHAFT OF HUMERUS, RIGHT ARM, SUBSEQUENT ENCOUNTER FOR FRACTURE WITH ROUTINE HEALING: ICD-10-CM

## 2025-02-25 PROCEDURE — 1159F MED LIST DOCD IN RCRD: CPT | Performed by: ORTHOPAEDIC SURGERY

## 2025-02-25 PROCEDURE — 99214 OFFICE O/P EST MOD 30 MIN: CPT | Performed by: ORTHOPAEDIC SURGERY

## 2025-02-25 PROCEDURE — 1036F TOBACCO NON-USER: CPT | Performed by: ORTHOPAEDIC SURGERY

## 2025-02-25 NOTE — PROGRESS NOTES
Subjective    Patient ID: Jaclyn Potts is a 79 y.o. female.    Chief Complaint: Follow-up of the Right Shoulder     Last Surgery: Open Reduction Internal Fixation Humerus - Right  Last Surgery Date: 5/17/2024    Patient is a 79 y.o. female who is s/p ORIF of right humerus. Date of surgery was 5/17/2024. Patient presents to review her Shoulder MRI. Patient complaints of some pain and rates it 1 out of 10. She is more bothered by her limited mobility and stiffness. She is in physical therapy making progress.  She reported that they have been focusing on scapular stabilization and rotator cuff exercises and passive range of motion.    Patient has pain at extreme overhead range of motion. She states when she is lying down she has full ROM vs weakness when she is standing up and doing exercise against gravity. Patient denies fever or chills, N/T or arm pain.     She also complaints of right ankle swelling and pain.       She has had rotator cuff injury repair bilaterally in 2006 and 2008.     ROS: All other systems have been reviewed and are negative except as previously noted in history of present illness.      IMP:  Problem List Items Addressed This Visit    None      Objective   General: Alert and oriented x 3, NAD, respirations easy and unlabored with no audible wheezes, skin warm and dry, speech and dress appropriate for noted age, affect euthymic.     Musculoskeletal: Right upper extremity  incision well-healed  compartments soft  mild swelling to upper extremity  sensation intact to light touch  motor intact including R/U/M/AIN/PIN nn.  palpable radial pulse 2+   Patient is able to perform active shoulder forward flexion to about 80 degrees and abduction to about 80 degrees also.  Passively I was able to get her to about 150 degrees of forward flexion laying down limited by pain.  Internal rotation to L1.    X-ray: Images of right humerus and shoulder reviewed personally by me today and reveal maintenance  of alignment of humerus surgical neck fracture with hardware in position and interval fracture healing and callus formation, evidence of osteochondral fracture of the head of the humerus which was present at the time of injury but healed.  The glenohumeral joint is well aligned.      I personally reviewed right shoulders MRI that reveals  Marked thinning of the supraspinatus and infraspinatus tendon  insertions suggesting at least high-grade partial-thickness articular surface tearing with foci of full-thickness tearing suspected. No  retracted full-thickness tears are seen. There is moderate to severe infraspinatus and mild to moderate supraspinatus muscle atrophy.   Mild to moderate glenohumeral and mild acromioclavicular osteoarthrosis. Trace subacromial subdeltoid bursal fluid.    Assessment/Plan   Encounter Diagnoses:  No diagnosis found.    PLAN: Patient is s/p ORIF of right humerus.  Patient was noted to have osteochondral head impaction fracture of the humerus.  Patient is making progress however she does have stiffness and subacromial impingement symptoms and rotator weakness. She denies any pain but is bothered by the stiffness and limited mobility. We reviewed the MRI of her shoulder that showed marked thinning of the supraspinatus and infraspinatus tendon insertions suggesting at least high-grade partial-thickness articular surface tearing with foci of full-thickness tearing.  I discussed treatment option with patient at this point or even arthroplasty versus attempted rotator cuff repair.  I discussed with her that the atrophy of the rotator cuff can sometimes make repair less likely to be successful.  I discussed with her and she reported that given minimal pain and mostly limited function she is not too keen on arthroplasty.  I discussed with her that I only perform open rotator cuff repair.  We will refer her to Dr. Jorge Silva for further evaluation and if arthroscopic repair and debridement and  subacromial repair is possible. We discussed  open approach to repair her injury if needed. We discussed she can hold her physical therapy, she can continue home exercises. Patient will follow up based on the recommendations of Dr. Silva.       No orders of the defined types were placed in this encounter.    No follow-ups on file.    Scribe Attestation  By signing my name below, ICathleen Scribe attest that this documentation has been prepared under the direction and in the presence of Koby Bradley MD. All medical record entries made by the Scribe were at my direction or personally dictated by me. I have reviewed the chart and agree that the record accurately reflects my personal performance of the history, physical exam, discussion and plan.

## 2025-03-05 ENCOUNTER — PREP FOR PROCEDURE (OUTPATIENT)
Dept: ORTHOPEDIC SURGERY | Facility: HOSPITAL | Age: 80
End: 2025-03-05

## 2025-03-05 ENCOUNTER — OFFICE VISIT (OUTPATIENT)
Dept: ORTHOPEDIC SURGERY | Facility: CLINIC | Age: 80
End: 2025-03-05
Payer: COMMERCIAL

## 2025-03-05 DIAGNOSIS — S42.351D DISPLACED COMMINUTED FRACTURE OF SHAFT OF HUMERUS, RIGHT ARM, SUBSEQUENT ENCOUNTER FOR FRACTURE WITH ROUTINE HEALING: ICD-10-CM

## 2025-03-05 DIAGNOSIS — M75.101 TEAR OF RIGHT ROTATOR CUFF, UNSPECIFIED TEAR EXTENT, UNSPECIFIED WHETHER TRAUMATIC: Primary | ICD-10-CM

## 2025-03-05 PROCEDURE — 99214 OFFICE O/P EST MOD 30 MIN: CPT | Performed by: STUDENT IN AN ORGANIZED HEALTH CARE EDUCATION/TRAINING PROGRAM

## 2025-03-05 PROCEDURE — L3670 SO ACRO/CLAV CAN WEB PRE OTS: HCPCS | Performed by: STUDENT IN AN ORGANIZED HEALTH CARE EDUCATION/TRAINING PROGRAM

## 2025-03-05 RX ORDER — CEFAZOLIN SODIUM 2 G/50ML
2 SOLUTION INTRAVENOUS ONCE
OUTPATIENT
Start: 2025-03-05 | End: 2025-03-05

## 2025-03-05 NOTE — PROGRESS NOTES
CHIEF COMPLAINT: No chief complaint on file.    History: 79 y.o. female presents to the office today for evaluation of her right shoulder.  Patient has an extensive history regarding the right shoulder.  She has a history of a previous rotator cuff repair performed many years ago on the right side.  It was functioning well until [place].  She eventually underwent a proximal humerus ORIF with one of my partners.  This went on to heal, but she has had persistent inability to raise the arm.  MRI was concerning for rotator cuff tear and she was referred to my office today for options.  She still considers himself quite active and is not happy with the function of her arm as it is.    Past medical history, past surgical history, medications, allergies, family history, social history, and review of systems were reviewed today.    A 12 point review of systems was negative other than as stated in the HPI.    Past Medical History:   Diagnosis Date    Other abnormal and inconclusive findings on diagnostic imaging of breast 12/09/2020    Abnormal mammogram of left breast    Other specified disorders of breast 12/09/2020    Seroma of breast    Personal history of other diseases of the circulatory system 12/09/2020    History of hypertension    Personal history of other diseases of the respiratory system 11/26/2019    History of acute bacterial sinusitis    Squamous cell carcinoma of skin of right lower limb, including hip 12/09/2020    Squamous cell carcinoma of skin of right lower extremity        Allergies   Allergen Reactions    Macrobid [Nitrofurantoin Monohyd/M-Cryst] Nausea/vomiting     Pt first discovered reaction to this medication on 9/10/24     Amoxicillin-Pot Clavulanate Itching     vomiting    Hydrocodone-Acetaminophen Rash and Itching        Past Surgical History:   Procedure Laterality Date    HERNIA REPAIR  11/30/2020    Hernia Repair    OTHER  SURGICAL HISTORY  10/19/2020    Biopsy Skin    ROTATOR CUFF REPAIR  11/30/2020    Rotator Cuff Repair    TOTAL ABDOMINAL HYSTERECTOMY  12/09/2020    Total Abdominal Hysterectomy    UMBILICAL HERNIA REPAIR  12/09/2020    Umbilical Hernia Repair        Family History   Problem Relation Name Age of Onset    Stroke Mother      Diabetes Mother      Alzheimer's disease Father      Lung cancer Sister      No Known Problems Sister      Asthma Brother      Cancer Brother      No Known Problems Brother      Breast cancer Daughter 42 40    Cancer Daughter 42     No Known Problems Daughter          Social History     Socioeconomic History    Marital status:      Spouse name: Not on file    Number of children: Not on file    Years of education: Not on file    Highest education level: Not on file   Occupational History    Not on file   Tobacco Use    Smoking status: Former     Current packs/day: 0.00     Types: Cigarettes     Quit date: 2010     Years since quitting: 15.1    Smokeless tobacco: Never   Vaping Use    Vaping status: Never Used   Substance and Sexual Activity    Alcohol use: Yes     Comment: rarely    Drug use: Never    Sexual activity: Not on file   Other Topics Concern    Not on file   Social History Narrative    Not on file     Social Drivers of Health     Financial Resource Strain: Not on file   Food Insecurity: Not on file   Transportation Needs: Not on file   Physical Activity: Not on file   Stress: Not on file   Social Connections: Not on file   Intimate Partner Violence: Not on file   Housing Stability: Not on file        CURRENT MEDICATIONS:   Current Outpatient Medications   Medication Sig Dispense Refill    acetaminophen (TylenoL) 325 mg tablet Take 2 tablets (650 mg) by mouth every 6 hours if needed for mild pain (1 - 3) or fever (temp greater than 38.0 C). For up to 14 days 90 tablet 0    albuterol (Proventil HFA) 90 mcg/actuation inhaler Inhale 2 puffs 4 times a day as needed.      albuterol 0.63  mg/3 mL nebulizer solution Take by nebulization.      calcium carb,gluc/mag ox,gluc (CALCIUM MAGNESIUM ORAL) 1 tablet once daily.      doxycycline (Adoxa) 100 mg tablet Take 1 tablet (100 mg) by mouth if needed.      doxycycline (Monodox) 100 mg capsule Take twice daily. Take with at least 8 ounces (large glass) of water, do not lie down for 30 minutes after 60 capsule 0    ergocalciferol (Vitamin D-2) 1.25 MG (76763 UT) capsule Take 1 capsule (50,000 Units) by mouth 1 (one) time per week. 12 capsule 3    gabapentin (Neurontin) 100 mg capsule Take 1 capsule (100 mg) by mouth 3 times a day. 270 capsule 3    hydroCHLOROthiazide (HYDRODiuril) 25 mg tablet TAKE 1 TABLET BY MOUTH EVERY DAY 90 tablet 1    ipratropium-albuteroL (Duo-Neb) 0.5-2.5 mg/3 mL nebulizer solution Take 3 mL by nebulization every 6 hours if needed for wheezing or shortness of breath. 180 mL     lidocaine (Xylocaine) 2 % solution Take by mouth.      meloxicam (Mobic) 15 mg tablet Take 1 tablet (15 mg) by mouth once daily.      metroNIDAZOLE (Metrocream) 0.75 % cream Apply to face twice daily 45 g 11    multivitamin tablet Take by mouth.      potassium chloride CR 20 mEq ER tablet TAKE 1 TABLET BY MOUTH EVERY DAY WITH FOOD 90 tablet 3    tiZANidine (Zanaflex) 4 mg tablet Take 1 tablet (4 mg) by mouth every 8 hours if needed for muscle spasms. 60 tablet 1    triamcinolone (Kenalog) 0.1 % cream Triamcinolone Acetonide 0.1 % External Cream   Quantity: 30  Refills: 0        Start : 9-Jun-2021   Active      zolpidem (Ambien) 10 mg tablet Take 1 tablet (10 mg) by mouth as needed at bedtime for sleep. 90 tablet 1     No current facility-administered medications for this visit.       Physical Examination:      5/17/2024     6:45 PM 5/17/2024     7:00 PM 9/9/2024     1:24 PM 10/16/2024     1:51 PM 10/16/2024     2:02 PM 10/17/2024     3:05 PM 2/22/2025    12:21 PM   Vitals   Systolic 137 143 148  138 124    Diastolic 63 65 88  80 60    BP Location     Left arm  "    Heart Rate 101 100   86 92    Resp 17 18        Height   1.549 m (5' 1\") 1.549 m (5' 0.98\")  1.549 m (5' 0.98\") 1.549 m (5' 1\")   Weight (lb)   196 195.99  196 190   BMI   37.03 kg/m2 37.05 kg/m2  37.05 kg/m2 35.9 kg/m2   BSA (m2)   1.96 m2 1.96 m2  1.96 m2 1.93 m2   Visit Report   Report Report Report Report       There is no height or weight on file to calculate BMI.    Well-appearing, appears stated age, pleasant and cooperative, appropriate mood and behavior. Height and weight reviewed. Alert and oriented x3.  Auditory function intact.  No acute distress.  Intact ocular function, TRAE, EOMI. Breathing is unlabored .  There is no evidence of jugular venous distension. Skin appearance is normal without evidence of rash or other lesions. 2+ radial pulses bilaterally, fingers pink and wwp, good capillary refill, no pitting edema. No appreciable lymphadenopathy in bilateral upper extremities. SILT throughout both upper extremities, median/radial/ulnar/musculocutaneous/axillary nerve motor and sensory intact (except for abnormalities noted in focused musculoskeletal exam section below).     Neck exam: Full range of motion of the neck in flexion/extension and rotational movements. No significant areas of tenderness to palpation in the neck.    On exam of bilateral upper extremities, well-healed incisions about the right shoulder.  Forward flexion to 80, external Tatian to 30, internal Tatian to L3.  The left she has forward flexion to 150, external Tatian to 60, internal Tatian to T12.  Rotator cuff strength is diminished on the right side, 3+ out of 5 supraspinatus strength, 4 out of 5 external Tatian strength on the right side.    Imaging: Radiographs and MRI of the right shoulder reviewed today.  Personally interpreted by myself.  There is a healed proximal humeral shaft fracture with hardware in place.  MRI does reveal tearing of the supraspinatus and infraspinatus.  Importantly, there is grade 4 fatty atrophy " of the infraspinatus while the supraspinatus appears relatively preserved.    Assessment: Likely acute on chronic, massive rotator cuff tear in the setting of trauma    Plan: I extensive discussion with the patient my treatment options diagnosis.  Based on her imaging, I do think that she had an element of the rotator cuff that was previously torn, but when she fell, she tore more of the tendon.  At the same time she sustained a fracture.  The fracture has now healed, but on exam she has very limited active range of motion of the shoulder.  Passively I can achieve much more range of motion than her active motion, which points to this being more of a strength issue as opposed to a stiffness issue.  MRI is definitely concerning for tears of the supraspinatus and infraspinatus.  There is also severe fatty atrophy of the infraspinatus.  I told her that truthfully, my opinion would be if she is looking mostly to regain better function the most reliable option would be to convert to a reverse shoulder replacement and remove the previous hardware.  However, she is not interested in an open surgery at this time and wants to try other options.  I told her the other option would be to attempt a arthroscopic rotator cuff repair.  However, given the significant fatty atrophy of the infraspinatus, I told her that there is about a 50% chance that this may not be successful.  That being said, if it does work she likely will be much better than she is now.  After an extensive discussion, the patient was not interested in reverse shoulder replacement and wanted to try an arthroscopic procedure first.  She voiced understanding that this may not work and that she may need a replacement in the future, or we will have to live with the shoulder as it is.    The patient has failed conservative management, including therapy and injections.  At this point, the patient is a candidate for surgery.  Patient is in agreement with this.  Risks  and recovery after surgery were discussed.  The proposed procedure will be an arthroscopic rotator cuff repair, extensive debridement, and possible biceps tenodesis depending on the state of the biceps.  Risks of surgery include bleeding, infection, failure of the tendon to heal, neurovascular injury, persistent pain, failure of the repair, and possible need for further surgery.  We discussed that there are certain risk factors for the rotator cuff tendon not healing to bone, including age over 65, large and massive rotator cuff tears, smoking, as well as others. All surgeries that are performed under anesthesia also have the risks of DVTs, pulmonary embolism, potentially death. Per anesthesia's evaluation, the patient will have a nerve block, the risks of which the anesthesia team will discuss with the patient.   Patient voiced understanding of these risks and wished to proceed.  Postoperative recovery involves being in a sling for 6 weeks without weightbearing, and then gradual therapy to strengthen the arm.  We did discuss that rotator cuff surgery has an extensive recovery, usually around 5 to 6 months until they are getting their strength back and probably feeling around 85% at that time. It is really 1 year until their arm is feeling completely better and at 100%.    The patient will need PATs which will also include a CBC, BMP, PT/INR and a full work up by the PAT team as well as anesthesia evaluation.  My office will work to get this scheduled. I will see them back 2 weeks after surgery.    Patient was prescribed a shoulder sling for postoperative care. The patient will have weakness, instability and/or deformity of their (right/left) arm which requires stabilization from this orthosis to improve their function after surgery. Verbal and written instructions for the use, wear schedule, cleaning and application of this item were given. Patient was instructed that should the brace result in increased pain,  decreased sensation, increased swelling, or an overall worsening of their medical condition, to please contact our office immediately. Orthotic management and training was provided for skin care, modifications due to healing tissues, edema changes, interruption in skin integrity, and safety precautions with the orthosis.      Dragon software was used to dictate this note, please be aware that minor errors in transcription may be present.    Jorge Silva MD    Shoulder/Elbow Surgery  [facility] [place]

## 2025-03-18 ENCOUNTER — APPOINTMENT (OUTPATIENT)
Dept: OBSTETRICS AND GYNECOLOGY | Facility: CLINIC | Age: 80
End: 2025-03-18
Payer: COMMERCIAL

## 2025-03-26 LAB
BENZODIAZ UR QL: NEGATIVE NG/ML
BZE UR QL: NEGATIVE NG/ML
CREAT UR-MCNC: 104.7 MG/DL
OPIATES UR QL: NEGATIVE NG/ML
OXIDANTS UR QL: NEGATIVE MCG/ML
OXYCODONE UR QL: NEGATIVE NG/ML
PH UR: 4.9 [PH] (ref 4.5–9)
QUEST NOTES AND COMMENTS: NORMAL

## 2025-04-07 DIAGNOSIS — F41.9 ANXIETY: ICD-10-CM

## 2025-04-07 RX ORDER — ZOLPIDEM TARTRATE 10 MG/1
10 TABLET ORAL NIGHTLY PRN
Qty: 90 TABLET | Refills: 1 | Status: SHIPPED | OUTPATIENT
Start: 2025-04-07

## 2025-04-08 ENCOUNTER — APPOINTMENT (OUTPATIENT)
Dept: OBSTETRICS AND GYNECOLOGY | Facility: CLINIC | Age: 80
End: 2025-04-08
Payer: COMMERCIAL

## 2025-04-08 VITALS
SYSTOLIC BLOOD PRESSURE: 134 MMHG | DIASTOLIC BLOOD PRESSURE: 78 MMHG | BODY MASS INDEX: 35.88 KG/M2 | WEIGHT: 195 LBS | HEIGHT: 62 IN

## 2025-04-08 DIAGNOSIS — Z01.419 WELL WOMAN EXAM: Primary | ICD-10-CM

## 2025-04-08 PROCEDURE — 3078F DIAST BP <80 MM HG: CPT | Performed by: OBSTETRICS & GYNECOLOGY

## 2025-04-08 PROCEDURE — 1159F MED LIST DOCD IN RCRD: CPT | Performed by: OBSTETRICS & GYNECOLOGY

## 2025-04-08 PROCEDURE — 1036F TOBACCO NON-USER: CPT | Performed by: OBSTETRICS & GYNECOLOGY

## 2025-04-08 PROCEDURE — G0101 CA SCREEN;PELVIC/BREAST EXAM: HCPCS | Performed by: OBSTETRICS & GYNECOLOGY

## 2025-04-08 PROCEDURE — 3075F SYST BP GE 130 - 139MM HG: CPT | Performed by: OBSTETRICS & GYNECOLOGY

## 2025-04-08 NOTE — PROGRESS NOTES
Subjective   Patient ID: Jaclyn Potts is a 79 y.o. female who presents for annual.  Review of note from annual 1 year ago,     Yasir Blanco MD  Physician  Obstetrics     Progress Notes     Signed     Encounter Date: 2/20/2024    Signed     Expand All Collapse All       Subjective  Patient ID: Jaclyn Potts is a 78 y.o. female who presents for Well woman visit.  MR breast bilateral w contrast full protocol  Status: Final result     Study Result     Narrative & Impression  Interpreted By:  Supriya Galvan,   STUDY:  BI MR BREAST BILATERAL WITH CONTRAST FULL PROTOCOL;  12/29/2023 4:00  pm      ACCESSION NUMBER(S):  [id number]      ORDERING CLINICIAN:  ROULA HARRIS      INDICATION:  High risk screening. Benign left excisional biopsy.      COMPARISON:  MAMMOGRAM 11/22/2023, MRI 06/10/2022.      TECHNIQUE:  Using a dedicated breast coil, STIR axial and T1-weighted fat  saturation axial images of the breasts were obtained, the latter both  before and after intravenous administration of Gadolinium DTPA. On an  independent workstation, 3-D images were formulated using [employer]  including time enhancement curves, subtraction images and MIP images.      Intravenous contrast:  20 mL of Dotarem      FINDINGS:  There is  symmetric  minimal bilateral background enhancement.      Density:  There are areas of scattered fibroglandular tissue.      RIGHT BREAST:  No suspicious mass or nonmass enhancement is  identified.      No axillary or internal mammary lymphadenopathy is appreciated.      LEFT BREAST:  Postoperative scarring and signal void from surgical  clips is seen in the deep upper outer breast. No suspicious mass or  nonmass enhancement is identified.      No axillary or internal mammary lymphadenopathy is appreciated.      NON-BREAST FINDINGS:  None.      IMPRESSION:  No MRI evidence of malignancy in either breast.      BI-RADS CATEGORY:  BI-RADS Category:  2 Benign.  Recommendation:  Routine Screening  Breast MRI in 1 Year.  Recommended Date:  1 Year.  Laterality:  Bilateral.      For any future breast imaging appointments, please call [phone]).          MACRO:  None      Signed by: Supriya Galvan 1/2/2024 7:57 AM  Dictation workstation:   [id number]  Established patient annual exam 78 years old.  Prior hysterectomy.  Would like to get back on oral Premarin.  She has had a previous benign breast biopsy.  Recent MRI was normal.  Past couple years she has had a right ankle fracture that needed revision     Exam breast abdominal pelvic exams normal     MRI reviewed.   estradiol 1 mg oral daily called in.  She states she always felt much better on oral estrogen.  We reviewed data from the women's health initiative.  She is working for [employer] for many years now              Review of Systems   All other systems reviewed and are negative.           Objective  Physical Exam  Constitutional:       Appearance: Normal appearance. She is obese.   Chest:   Breasts:     Right: Normal.      Left: Normal.   Genitourinary:     General: Normal vulva.      Adnexa: Right adnexa normal and left adnexa normal.   Neurological:      Mental Status: She is alert.               Assessment/Plan  Medicare well woman exam,      Resume oral estrogen.  Called in both Premarin and estradiol.  She will determine which 1 is cost effective     Yasir Blanco MD 02/20/24 3:22 PM           Mammogram from October,  BI mammo bilateral screening tomosynthesis  Status: Final result    Study Result    Narrative & Impression  Interpreted By:  Leif Mays,   STUDY:  BI MAMMO BILATERAL SCREENING TOMOSYNTHESIS; 10/16/2024 1:58 pm      ACCESSION NUMBER(S):  [id number]      ORDERING CLINICIAN:  ROULA HARRIS      INDICATION:  Screening. History of a left excisional biopsy      ,Z12.31 Encounter for screening mammogram for malignant neoplasm of  breast      COMPARISON:  09/23/2021, 11/18/2022, 11/22/2023      FINDINGS:  2D and tomosynthesis  images were reviewed at 1 mm slice thickness.      Density:  There are scattered areas of fibroglandular density.      Postsurgical changes are noted in the upper-outer quadrant the left  breast. No suspicious masses or calcifications are identified.      IMPRESSION:  No mammographic evidence of malignancy.      BI-RADS CATEGORY:  BI-RADS Category:  2 Benign.  Recommendation:  Annual Screening.  Recommended Date:  1 Year.  Laterality:  Bilateral.              For any future breast imaging appointments, please call [phone] ([phone]).          MACRO:  None      Signed by: Leif Mays 10/16/2024 2:52 PM  Dictation workstation:   [id number]    Since her annual last year she fractured her humerus when she tripped over a curb in [place].  To affected range of motion she has atrophy in her infraspinatus.  She is scheduled for arthroscopic surgery in June.    On exam breast abdominal pelvic exams normal.    She is not taking any estrogen replacement therapy.    Medicare pelvic breast exam        Review of Systems   All other systems reviewed and are negative.      Objective   Physical Exam  Constitutional:       Appearance: Normal appearance. She is obese.   Chest:   Breasts:     Right: Normal.      Left: Normal.   Genitourinary:     General: Normal vulva.      Vagina: Normal.      Comments: No pelvic masses noted  Neurological:      Mental Status: She is alert.         Assessment/Plan   Medicare pelvic breast exam.  Scheduled for arthroscopic surgery of the right rotator cuff.  Atrophy of the infraspinatus and had a fall a year ago in [place] fracturing her right humerus, requiring surgery         Yasir Blanco MD 04/08/25 3:56 PM

## 2025-04-23 ENCOUNTER — APPOINTMENT (OUTPATIENT)
Dept: DERMATOLOGY | Facility: CLINIC | Age: 80
End: 2025-04-23
Payer: COMMERCIAL

## 2025-04-23 DIAGNOSIS — L71.9 ROSACEA: ICD-10-CM

## 2025-04-23 DIAGNOSIS — D18.01 CHERRY ANGIOMA: ICD-10-CM

## 2025-04-23 DIAGNOSIS — L82.1 SEBORRHEIC KERATOSES: ICD-10-CM

## 2025-04-23 DIAGNOSIS — D22.9 MULTIPLE BENIGN MELANOCYTIC NEVI: ICD-10-CM

## 2025-04-23 DIAGNOSIS — L90.5 SCAR CONDITIONS AND FIBROSIS OF SKIN: ICD-10-CM

## 2025-04-23 DIAGNOSIS — L57.8 SUN-DAMAGED SKIN: ICD-10-CM

## 2025-04-23 DIAGNOSIS — Z12.83 SKIN CANCER SCREENING: ICD-10-CM

## 2025-04-23 DIAGNOSIS — L57.0 ACTINIC KERATOSIS: Primary | ICD-10-CM

## 2025-04-23 PROCEDURE — 1159F MED LIST DOCD IN RCRD: CPT | Performed by: DERMATOLOGY

## 2025-04-23 PROCEDURE — 1036F TOBACCO NON-USER: CPT | Performed by: DERMATOLOGY

## 2025-04-23 PROCEDURE — 99214 OFFICE O/P EST MOD 30 MIN: CPT | Performed by: DERMATOLOGY

## 2025-04-23 RX ORDER — METRONIDAZOLE 7.5 MG/G
CREAM TOPICAL
Qty: 45 G | Refills: 11 | Status: SHIPPED | OUTPATIENT
Start: 2025-04-23

## 2025-04-23 ASSESSMENT — DERMATOLOGY PATIENT ASSESSMENT
HAVE YOU HAD OR DO YOU HAVE VASCULAR DISEASE: NO
DO YOU USE SUNSCREEN: OCCASIONALLY
ARE YOU TRYING TO GET PREGNANT: NO
ARE YOU ON BIRTH CONTROL: NO
HAVE YOU HAD OR DO YOU HAVE A STAPH INFECTION: NO
DO YOU HAVE ANY NEW OR CHANGING LESIONS: YES
ARE YOU AN ORGAN TRANSPLANT RECIPIENT: NO
DO YOU HAVE IRREGULAR MENSTRUAL CYCLES: NO
DO YOU USE A TANNING BED: NO

## 2025-04-23 ASSESSMENT — DERMATOLOGY QUALITY OF LIFE (QOL) ASSESSMENT
RATE HOW EMOTIONALLY BOTHERED YOU ARE BY YOUR SKIN PROBLEM (FOR EXAMPLE, WORRY, EMBARRASSMENT, FRUSTRATION): 1
WHAT SINGLE SKIN CONDITION LISTED BELOW IS THE PATIENT ANSWERING THE QUALITY-OF-LIFE ASSESSMENT QUESTIONS ABOUT: NONE OF THE ABOVE
ARE THERE EXCLUSIONS OR EXCEPTIONS FOR THE QUALITY OF LIFE ASSESSMENT: NO
RATE HOW BOTHERED YOU ARE BY EFFECTS OF YOUR SKIN PROBLEMS ON YOUR ACTIVITIES (EG, GOING OUT, ACCOMPLISHING WHAT YOU WANT, WORK ACTIVITIES OR YOUR RELATIONSHIPS WITH OTHERS): 1
DATE THE QUALITY-OF-LIFE ASSESSMENT WAS COMPLETED: 67318
RATE HOW BOTHERED YOU ARE BY SYMPTOMS OF YOUR SKIN PROBLEM (EG, ITCHING, STINGING BURNING, HURTING OR SKIN IRRITATION): 1

## 2025-04-23 ASSESSMENT — ITCH NUMERIC RATING SCALE: HOW SEVERE IS YOUR ITCHING?: 0

## 2025-04-23 ASSESSMENT — PATIENT GLOBAL ASSESSMENT (PGA): PATIENT GLOBAL ASSESSMENT: PATIENT GLOBAL ASSESSMENT:  1 - CLEAR

## 2025-04-23 NOTE — Clinical Note
- The premalignant nature of the disorder was reviewed and treatment options were reviewed.   - she uses efudex PRN - will use to treat the 3 Actinic keratoses on right temple, left nasal side wall, one on left cheek; mid cheek lesion ddx is pigmented Actinic keratoses vs lentigo, she is aware that if it doesn't respond to efudex with bid tx x 2-3 weeks, ok to leave alone for recheck at next visit.   - she has used efudex frequently and is aware of risks/benefits and appropriate use.   - if needs refill, ok to send until next visit.

## 2025-04-23 NOTE — Clinical Note
Seborrheic keratosis (-es)  - Discussed benign nature and that no treatment is necessary unless it becomes painful or increases in size. Patient opts for clinical monitoring at this time.

## 2025-04-23 NOTE — Clinical Note
Scattered pink to red papules, some with central pustule on forehead, cheeks, nose, chin; background erythema and telangiectasias

## 2025-04-23 NOTE — Clinical Note
Rosacea- known diagnosis, much improved after flare 12/2024 and short course of doxycycline + metrocream  - uses metrocream prn, encouraged bid consistent use  - sees  for sensitive care as well  - strict UV protection

## 2025-04-23 NOTE — Clinical Note
Benign melanocytic nevi  - Discussed benign nature and that no treatment is necessary unless it becomes painful or increases in size. Patient opts for clinical monitoring at this time.    - Sun protective behavior reviewed and encouraged including the use of over-the-counter sunscreen with SPF30+ daily (reapply every 1.5 hours when outdoors), UPF clothing, broad rimmed hats, sunglasses, and avoidance of midday sun. Home skin monitoring encouraged and how to monitor for skin cancer (changing or new moles, new rapidly growing or non-healing lesions) reviewed. Patient encouraged to call with interval concerns or changes.

## 2025-04-23 NOTE — Clinical Note
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

## 2025-04-23 NOTE — Clinical Note
Actinically damaged skin-  - h/o many Actinic keratoses and uses Efudex PRN face, extremities, chest  - Sun protective behavior reviewed and encouraged including the use of over-the-counter sunscreen with SPF30+ daily (reapply every 1.5 hours when outdoors), UPF clothing, broad rimmed hats, sunglasses, and avoidance of midday sun. Home skin monitoring encouraged and how to monitor for skin cancer (changing or new moles, new rapidly growing or non-healing lesions) reviewed. Patient encouraged to call with interval concerns or changes.      - Follows with an , ok to continue

## 2025-04-23 NOTE — Clinical Note
Cherry angioma(s)  - Discussed benign nature and that no treatment is necessary unless it becomes painful or increases in size. Patient opts for clinical monitoring at this time.

## 2025-04-23 NOTE — Clinical Note
- Well healed scar(s) at sites of prior skin cancer - Invasive squamous cell carcinoma right lower leg 2020, squamous cell carcinoma in situ (right and left lower legs)  - Sun protective behavior reviewed and encouraged including the use of over-the-counter sunscreen with SPF30+ daily (reapply every 1.5 hours when outdoors), UPF clothing, broad rimmed hats, sunglasses, and avoidance of midday sun. Home skin monitoring encouraged and how to monitor for skin cancer (changing or new moles, new rapidly growing or non-healing lesions) reviewed. Patient encouraged to call with interval concerns or changes.

## 2025-04-23 NOTE — PROGRESS NOTES
Subjective     Jaclyn Potts is a 79 y.o. female who presents for the following: Skin Check (Efudex treatment follow up of left brock.  Jaclyn reports rough spot on the left side of nose, and red spot on the temple of the right side of her face.).     She used efudex bidfor 4 weeks to sccis on left lower leg with no response, now with scar that is slightly tender.   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    The following portions of the chart were reviewed this encounter and updated as appropriate:       Specialty Problems          Dermatology Problems    Toenail fungus    Squamous cell carcinoma of skin    Dx: Squamous cell carcinoma Location Right leg 9/2015 Treatment: excision     Dx: Squamous cell carcinoma in situ Location: right shin 11/2019 Tx: curettage     Dx: Squamous cell carcinoma in situ Location: right medial calf 11/2019 Tx: curettage          Past Medical History:  Jaclyn Potts  has a past medical history of Other abnormal and inconclusive findings on diagnostic imaging of breast (12/09/2020), Other specified disorders of breast (12/09/2020), Personal history of other diseases of the circulatory system (12/09/2020), Personal history of other diseases of the respiratory system (11/26/2019), and Squamous cell carcinoma of skin of right lower limb, including hip (12/09/2020).    Past Surgical History:  Jaclyn Potts  has  a past surgical history that includes Other surgical history (10/19/2020); Hernia repair (11/30/2020); Total abdominal hysterectomy (12/09/2020); Umbilical hernia repair (12/09/2020); and Rotator cuff repair (11/30/2020).    Family History:  Patient family history includes Alzheimer's disease in her father; Asthma in her brother; Breast cancer (age of onset: 40) in her daughter; Cancer in her brother and daughter; Diabetes in her mother; Lung cancer in her sister; No Known Problems in her brother, daughter, and sister; Stroke in her mother.    Social History:  Jaclyn Potts  reports that she quit smoking about 15 years ago. Her smoking use included cigarettes. She has never used smokeless tobacco. She reports current alcohol use. She reports that she does not use drugs.    Allergies:  Macrobid [nitrofurantoin monohyd/m-cryst], Amoxicillin-pot clavulanate, and Hydrocodone-acetaminophen    Current Medications / CAM's:  Current Medications[1]     Objective   Well appearing patient in no apparent distress; mood and affect are within normal limits.    A full examination was performed including scalp, head, eyes, ears, nose, lips, neck, chest, axillae, abdomen, back, buttocks, bilateral upper extremities, bilateral lower extremities, hands, feet, fingers, toes, fingernails, and toenails. Patient declined genital and gluteal cleft exam.  All findings within normal limits unless otherwise noted below.    - scattered regular brown macules and papules  - Scattered waxy tan/grey/brown papules with horn cysts  - scattered small bright red papules and macules  - Well healed scar at prior treatment sites without visual or palpable evidence of recurrence.   - scattered tan macules, telangiectasias, and general photo-damage  Nose  Erythematous macules with gritty scale.  Brown macule with mild scale left mid cheek  Head - Anterior (Face)  Scattered pink to red papules, some with central pustule on forehead, cheeks, nose,  chin; background erythema and telangiectasias       Assessment/Plan   ACTINIC KERATOSIS  Nose  - The premalignant nature of the disorder was reviewed and treatment options were reviewed.   - she uses efudex PRN - will use to treat the 3 Actinic keratoses on right temple, left nasal side wall, one on left cheek; mid cheek lesion ddx is pigmented Actinic keratoses vs lentigo, she is aware that if it doesn't respond to efudex with bid tx x 2-3 weeks, ok to leave alone for recheck at next visit.   - she has used efudex frequently and is aware of risks/benefits and appropriate use.   - if needs refill, ok to send until next visit.   SKIN CANCER SCREENING      Related Procedures  Follow Up In Dermatology - Established Patient  Follow Up In Dermatology - Established Patient  ROSACEA  Head - Anterior (Face)  Rosacea- known diagnosis, much improved after flare 12/2024 and short course of doxycycline + metrocream  - uses metrocream prn, encouraged bid consistent use  - sees  for sensitive care as well  - strict UV protection  Related Medications  metroNIDAZOLE (Metrocream) 0.75 % cream  Apply to face twice daily  MULTIPLE BENIGN MELANOCYTIC NEVI  Generalized  Benign melanocytic nevi  - Discussed benign nature and that no treatment is necessary unless it becomes painful or increases in size. Patient opts for clinical monitoring at this time.    - Sun protective behavior reviewed and encouraged including the use of over-the-counter sunscreen with SPF30+ daily (reapply every 1.5 hours when outdoors), UPF clothing, broad rimmed hats, sunglasses, and avoidance of midday sun. Home skin monitoring encouraged and how to monitor for skin cancer (changing or new moles, new rapidly growing or non-healing lesions) reviewed. Patient encouraged to call with interval concerns or changes.    SEBORRHEIC KERATOSES  Generalized  Seborrheic keratosis (-es)  - Discussed benign nature and that no treatment is necessary unless it  becomes painful or increases in size. Patient opts for clinical monitoring at this time.    OLIVIA ANGIOMA  Generalized  Cherry angioma(s)  - Discussed benign nature and that no treatment is necessary unless it becomes painful or increases in size. Patient opts for clinical monitoring at this time.    SCAR CONDITIONS AND FIBROSIS OF SKIN  Generalized  - Well healed scar(s) at sites of prior skin cancer - Invasive squamous cell carcinoma right lower leg 2020, squamous cell carcinoma in situ (right and left lower legs)  - Sun protective behavior reviewed and encouraged including the use of over-the-counter sunscreen with SPF30+ daily (reapply every 1.5 hours when outdoors), UPF clothing, broad rimmed hats, sunglasses, and avoidance of midday sun. Home skin monitoring encouraged and how to monitor for skin cancer (changing or new moles, new rapidly growing or non-healing lesions) reviewed. Patient encouraged to call with interval concerns or changes.     SUN-DAMAGED SKIN  Generalized  Actinically damaged skin-  - h/o many Actinic keratoses and uses Efudex PRN face, extremities, chest  - Sun protective behavior reviewed and encouraged including the use of over-the-counter sunscreen with SPF30+ daily (reapply every 1.5 hours when outdoors), UPF clothing, broad rimmed hats, sunglasses, and avoidance of midday sun. Home skin monitoring encouraged and how to monitor for skin cancer (changing or new moles, new rapidly growing or non-healing lesions) reviewed. Patient encouraged to call with interval concerns or changes.      - Follows with an , ok to continue     6 mos fbse    E4- 2 chronic problems with med Rx management    Anastasia Brandon MD             [1]   Current Outpatient Medications:     acetaminophen (TylenoL) 325 mg tablet, Take 2 tablets (650 mg) by mouth every 6 hours if needed for mild pain (1 - 3) or fever (temp greater than 38.0 C). For up to 14 days, Disp: 90 tablet, Rfl: 0    albuterol (Proventil  HFA) 90 mcg/actuation inhaler, Inhale 2 puffs 4 times a day as needed., Disp: , Rfl:     albuterol 0.63 mg/3 mL nebulizer solution, Take by nebulization., Disp: , Rfl:     calcium carb,gluc/mag ox,gluc (CALCIUM MAGNESIUM ORAL), 1 tablet once daily., Disp: , Rfl:     doxycycline (Adoxa) 100 mg tablet, Take 1 tablet (100 mg) by mouth if needed., Disp: , Rfl:     ergocalciferol (Vitamin D-2) 1.25 MG (57417 UT) capsule, Take 1 capsule (50,000 Units) by mouth 1 (one) time per week., Disp: 12 capsule, Rfl: 3    gabapentin (Neurontin) 100 mg capsule, Take 1 capsule (100 mg) by mouth 3 times a day., Disp: 270 capsule, Rfl: 3    hydroCHLOROthiazide (HYDRODiuril) 25 mg tablet, TAKE 1 TABLET BY MOUTH EVERY DAY, Disp: 90 tablet, Rfl: 1    ipratropium-albuteroL (Duo-Neb) 0.5-2.5 mg/3 mL nebulizer solution, Take 3 mL by nebulization every 6 hours if needed for wheezing or shortness of breath., Disp: 180 mL, Rfl:     lidocaine (Xylocaine) 2 % solution, Take by mouth., Disp: , Rfl:     meloxicam (Mobic) 15 mg tablet, Take 1 tablet (15 mg) by mouth once daily., Disp: , Rfl:     multivitamin tablet, Take by mouth., Disp: , Rfl:     potassium chloride CR 20 mEq ER tablet, TAKE 1 TABLET BY MOUTH EVERY DAY WITH FOOD, Disp: 90 tablet, Rfl: 3    tiZANidine (Zanaflex) 4 mg tablet, Take 1 tablet (4 mg) by mouth every 8 hours if needed for muscle spasms., Disp: 60 tablet, Rfl: 1    triamcinolone (Kenalog) 0.1 % cream, Triamcinolone Acetonide 0.1 % External Cream  Quantity: 30  Refills: 0      Start : 9-Jun-2021  Active, Disp: , Rfl:     zolpidem (Ambien) 10 mg tablet, Take 1 tablet (10 mg) by mouth as needed at bedtime for sleep., Disp: 90 tablet, Rfl: 1    metroNIDAZOLE (Metrocream) 0.75 % cream, Apply to face twice daily, Disp: 45 g, Rfl: 11

## 2025-04-27 LAB
25(OH)D3+25(OH)D2 SERPL-MCNC: 62 NG/ML (ref 30–100)
ALBUMIN SERPL-MCNC: 4 G/DL (ref 3.6–5.1)
ALBUMIN/GLOB SERPL: 1.5 (CALC) (ref 1–2.5)
ALP SERPL-CCNC: 74 U/L (ref 37–153)
ALT SERPL-CCNC: 16 U/L (ref 6–29)
ANION GAP SERPL CALCULATED.4IONS-SCNC: 7 MMOL/L (CALC) (ref 7–17)
AST SERPL-CCNC: 15 U/L (ref 10–35)
BASOPHILS # BLD AUTO: 69 CELLS/UL (ref 0–200)
BASOPHILS NFR BLD AUTO: 0.6 %
BILIRUB DIRECT SERPL-MCNC: 0.1 MG/DL
BILIRUB INDIRECT SERPL-MCNC: 0.5 MG/DL (CALC) (ref 0.2–1.2)
BILIRUB SERPL-MCNC: 0.6 MG/DL (ref 0.2–1.2)
BUN SERPL-MCNC: 18 MG/DL (ref 7–25)
BUN/CREAT SERPL: ABNORMAL (CALC) (ref 6–22)
CALCIUM SERPL-MCNC: 8.7 MG/DL (ref 8.6–10.4)
CHLORIDE SERPL-SCNC: 102 MMOL/L (ref 98–110)
CHOLEST SERPL-MCNC: 167 MG/DL
CHOLEST/HDLC SERPL: 4.3 (CALC)
CO2 SERPL-SCNC: 31 MMOL/L (ref 20–32)
CREAT SERPL-MCNC: 0.67 MG/DL (ref 0.6–1)
EGFRCR SERPLBLD CKD-EPI 2021: 90 ML/MIN/1.73M2
EOSINOPHIL # BLD AUTO: 219 CELLS/UL (ref 15–500)
EOSINOPHIL NFR BLD AUTO: 1.9 %
ERYTHROCYTE [DISTWIDTH] IN BLOOD BY AUTOMATED COUNT: 13.2 % (ref 11–15)
EST. AVERAGE GLUCOSE BLD GHB EST-MCNC: 134 MG/DL
EST. AVERAGE GLUCOSE BLD GHB EST-SCNC: 7.4 MMOL/L
GLOBULIN SER CALC-MCNC: 2.6 G/DL (CALC) (ref 1.9–3.7)
GLUCOSE SERPL-MCNC: 146 MG/DL (ref 65–99)
HBA1C MFR BLD: 6.3 %
HCT VFR BLD AUTO: 48.8 % (ref 35–45)
HDLC SERPL-MCNC: 39 MG/DL
HGB BLD-MCNC: 16.3 G/DL (ref 11.7–15.5)
LDLC SERPL CALC-MCNC: 101 MG/DL (CALC)
LYMPHOCYTES # BLD AUTO: 3197 CELLS/UL (ref 850–3900)
LYMPHOCYTES NFR BLD AUTO: 27.8 %
MCH RBC QN AUTO: 30 PG (ref 27–33)
MCHC RBC AUTO-ENTMCNC: 33.4 G/DL (ref 32–36)
MCV RBC AUTO: 89.9 FL (ref 80–100)
MONOCYTES # BLD AUTO: 794 CELLS/UL (ref 200–950)
MONOCYTES NFR BLD AUTO: 6.9 %
NEUTROPHILS # BLD AUTO: 7222 CELLS/UL (ref 1500–7800)
NEUTROPHILS NFR BLD AUTO: 62.8 %
NONHDLC SERPL-MCNC: 128 MG/DL (CALC)
PLATELET # BLD AUTO: 288 THOUSAND/UL (ref 140–400)
PMV BLD REES-ECKER: 9.8 FL (ref 7.5–12.5)
POTASSIUM SERPL-SCNC: 3.8 MMOL/L (ref 3.5–5.3)
PROT SERPL-MCNC: 6.6 G/DL (ref 6.1–8.1)
RBC # BLD AUTO: 5.43 MILLION/UL (ref 3.8–5.1)
SODIUM SERPL-SCNC: 140 MMOL/L (ref 135–146)
TRIGL SERPL-MCNC: 173 MG/DL
TSH SERPL-ACNC: 2.57 MIU/L (ref 0.4–4.5)
WBC # BLD AUTO: 11.5 THOUSAND/UL (ref 3.8–10.8)

## 2025-04-28 ENCOUNTER — APPOINTMENT (OUTPATIENT)
Dept: PRIMARY CARE | Facility: CLINIC | Age: 80
End: 2025-04-28
Payer: COMMERCIAL

## 2025-05-08 ENCOUNTER — PRE-ADMISSION TESTING (OUTPATIENT)
Dept: PREADMISSION TESTING | Facility: HOSPITAL | Age: 80
End: 2025-05-08
Payer: COMMERCIAL

## 2025-05-08 VITALS
RESPIRATION RATE: 18 BRPM | HEIGHT: 62 IN | DIASTOLIC BLOOD PRESSURE: 90 MMHG | OXYGEN SATURATION: 96 % | WEIGHT: 198.41 LBS | BODY MASS INDEX: 36.51 KG/M2 | HEART RATE: 86 BPM | TEMPERATURE: 97.9 F | SYSTOLIC BLOOD PRESSURE: 181 MMHG

## 2025-05-08 DIAGNOSIS — I10 PRIMARY HYPERTENSION: Primary | ICD-10-CM

## 2025-05-08 PROCEDURE — 99204 OFFICE O/P NEW MOD 45 MIN: CPT | Performed by: NURSE PRACTITIONER

## 2025-05-08 PROCEDURE — 93005 ELECTROCARDIOGRAM TRACING: CPT

## 2025-05-08 ASSESSMENT — PAIN SCALES - GENERAL: PAINLEVEL_OUTOF10: 0 - NO PAIN

## 2025-05-08 ASSESSMENT — DUKE ACTIVITY SCORE INDEX (DASI)
CAN YOU RUN A SHORT DISTANCE: NO
DASI METS SCORE: 5.7
CAN YOU CLIMB A FLIGHT OF STAIRS OR WALK UP A HILL: YES
CAN YOU TAKE CARE OF YOURSELF (EAT, DRESS, BATHE, OR USE TOILET): YES
CAN YOU DO MODERATE WORK AROUND THE HOUSE LIKE VACUUMING, SWEEPING FLOORS OR CARRYING GROCERIES: YES
CAN YOU DO LIGHT WORK AROUND THE HOUSE LIKE DUSTING OR WASHING DISHES: YES
CAN YOU WALK A BLOCK OR TWO ON LEVEL GROUND: YES
TOTAL_SCORE: 24.2
CAN YOU PARTICIPATE IN STRENOUS SPORTS LIKE SWIMMING, SINGLES TENNIS, FOOTBALL, BASKETBALL, OR SKIING: NO
CAN YOU PARTICIPATE IN MODERATE RECREATIONAL ACTIVITIES LIKE GOLF, BOWLING, DANCING, DOUBLES TENNIS OR THROWING A BASEBALL OR FOOTBALL: NO
CAN YOU DO YARD WORK LIKE RAKING LEAVES, WEEDING OR PUSHING A MOWER: NO
CAN YOU WALK INDOORS, SUCH AS AROUND YOUR HOUSE: YES
CAN YOU HAVE SEXUAL RELATIONS: YES
CAN YOU DO HEAVY WORK AROUND THE HOUSE LIKE SCRUBBING FLOORS OR LIFTING AND MOVING HEAVY FURNITURE: NO

## 2025-05-08 ASSESSMENT — PAIN - FUNCTIONAL ASSESSMENT: PAIN_FUNCTIONAL_ASSESSMENT: 0-10

## 2025-05-08 NOTE — PREPROCEDURE INSTRUCTIONS
Medication List            Accurate as of May 8, 2025  3:02 PM. Always use your most recent med list.                acetaminophen 325 mg tablet  Commonly known as: TylenoL  Take 2 tablets (650 mg) by mouth every 6 hours if needed for mild pain (1 - 3) or fever (temp greater than 38.0 C). For up to 14 days  Medication Adjustments for Surgery: Take/Use as prescribed     CALCIUM MAGNESIUM ORAL  Additional Medication Adjustments for Surgery: Take last dose 7 days before surgery  Notes to patient: last dose preoperatively on 5/28/25     doxycycline 100 mg tablet  Commonly known as: Adoxa  Medication Adjustments for Surgery: Take/Use as prescribed     ergocalciferol 1250 mcg (50,000 units) capsule  Commonly known as: Vitamin D-2  Take 1 capsule (50,000 Units) by mouth 1 (one) time per week.  Additional Medication Adjustments for Surgery: Take last dose 7 days before surgery  Notes to patient: last dose preoperatively on 5/28/25     gabapentin 100 mg capsule  Commonly known as: Neurontin  Take 1 capsule (100 mg) by mouth 3 times a day.  Medication Adjustments for Surgery: Take/Use as prescribed     hydroCHLOROthiazide 25 mg tablet  Commonly known as: HYDRODiuril  TAKE 1 TABLET BY MOUTH EVERY DAY  Medication Adjustments for Surgery: Take/Use as prescribed     ipratropium-albuteroL 0.5-2.5 mg/3 mL nebulizer solution  Commonly known as: Duo-Neb  Take 3 mL by nebulization every 6 hours if needed for wheezing or shortness of breath.  Medication Adjustments for Surgery: Take/Use as prescribed     lidocaine 2 % solution  Commonly known as: Xylocaine  Medication Adjustments for Surgery: Do Not take on the morning of surgery     meloxicam 15 mg tablet  Commonly known as: Mobic  Take 1 tablet (15 mg) by mouth once daily.  Additional Medication Adjustments for Surgery: Take last dose 7 days before surgery  Notes to patient: last dose preoperatively on 5/28/25     metroNIDAZOLE 0.75 % cream  Commonly known as: Metrocream  Apply to  face twice daily  Medication Adjustments for Surgery: Do Not take on the morning of surgery     multivitamin tablet  Additional Medication Adjustments for Surgery: Take last dose 7 days before surgery  Notes to patient: last dose preoperatively on 5/28/25     potassium chloride CR 20 mEq ER tablet  Commonly known as: Klor-Con M20  TAKE 1 TABLET BY MOUTH EVERY DAY WITH FOOD  Medication Adjustments for Surgery: Do Not take on the morning of surgery     * albuterol 0.63 mg/3 mL nebulizer solution  Medication Adjustments for Surgery: Take/Use as prescribed     * Proventil HFA 90 mcg/actuation inhaler  Generic drug: albuterol  Medication Adjustments for Surgery: Take/Use as prescribed     tiZANidine 4 mg tablet  Commonly known as: Zanaflex  Take 1 tablet (4 mg) by mouth every 8 hours if needed for muscle spasms.  Medication Adjustments for Surgery: Do Not take on the morning of surgery     triamcinolone 0.1 % cream  Commonly known as: Kenalog  Medication Adjustments for Surgery: Do Not take on the morning of surgery     zolpidem 10 mg tablet  Commonly known as: Ambien  Take 1 tablet (10 mg) by mouth as needed at bedtime for sleep.  Medication Adjustments for Surgery: Take/Use as prescribed           * This list has 2 medication(s) that are the same as other medications prescribed for you. Read the directions carefully, and ask your doctor or other care provider to review them with you.                NPO Instructions:     Do not eat any food after midnight the night before your surgery/procedure.  You may have clear liquids until TWO hours before surgery/procedure. This includes water, black tea/coffee, (no milk or cream) apple juice and electrolyte drinks (Gatorade).  You may chew gum up to TWO hours before your surgery/procedure.     Additional Instructions:      Seven/Six Days before Surgery:  Review your medication instructions, stop indicated medications  Five Days before Surgery:  Review your medication  instructions, stop indicated medications  Three Days before Surgery:  Review your medication instructions, stop indicated medications  The Day before Surgery:  No smoking or alcohol use 24 hours before surgery  Review your medication instructions, stop indicated medications  You will be contacted regarding the time of your arrival to facility and surgery time  Do not eat any food after Midnight  Day of Surgery:  Review your medication instructions, take indicated medications  If you have diabetes, please check your fasting blood sugar upon awakening.  If fasting blood sugar is <80 mg/dl, drink 100 ml of apple juice, time limit of 2 hours before  You may have clear liquids until TWO hours before surgery/procedure.  This includes water, black tea/coffee, (no milk or cream) apple juice and electrolyte drinks (Gatorade)  You may chew gum up to TWO hours before your surgery/procedure  Wear  comfortable loose fitting clothing  Do not use moisturizers, creams, lotions or perfume  All jewelry and valuables should be left at home     CONTACT SURGEON'S OFFICE IF YOU DEVELOP:  * Fever = 100.4 F   * New respiratory symptoms (e.g. cough, shortness of breath, respiratory distress, sore throat)  * Recent loss of taste or smell  *Flu like symptoms such as headache, fatigue or gastrointestinal symptoms  * You develop any open sores, shingles, burning or painful urination   AND/OR:  * You no longer wish to have the surgery.  * Any other personal circumstances change that may lead to the need to cancel or defer this surgery.  *You were admitted to any hospital within one week of your planned procedure.     SMOKING:  *Quitting smoking can make a huge difference to your health and recovery from surgery.    *If you need help with quitting, call 0-800-QUIT-NOW.     THE DAY BEFORE SURGERY:  *Do not eat any food after midnight the night before your surgery.   *You may have up to TEN OUNCES of clear liquids until TWO hours before your  instructed ARRIVAL TIME to hospital. This includes water, black tea/coffee, (no milk or cream) apple juice, clear broth and electrolyte drinks (Gatorade). Please avoid clear liquids that are red in color.   *You may chew gum/mints up to TWO hours before your surgery/procedure.     SURGICAL TIME:  *You will be contacted between 2 p.m. and 3 p.m. the business day before your surgery with your arrival time.  *If you haven't received a call by 3pm, call [phone]  *Scheduled surgery times may change and you will be notified if this occurs-check your personal voicemail for any updates.     ON THE MORNING OF SURGERY:  *Wear comfortable, loose fitting clothing.   *Do not use moisturizers, creams, lotions or perfume.  *All jewelry and valuables should be left at home.  *Prosthetic devices such as contact lenses, hearing aids, dentures, eyelash extensions, hairpins and body piercing must be removed before surgery.     BRING WITH YOU:  *Photo ID and insurance card  *Current list of medications and allergies  *Pacemaker/Defibrillator/Heart stent cards  *CPAP machine and mask  *Slings/splints/crutches  *Copy of your complete Advanced Directive/DHPOA-if applicable  *Neurostimulator implant remote     PARKING AND ARRIVAL:  *Check in at the Main Entrance desk and let them know you are here for surgery.     IF YOU ARE HAVING OUTPATIENT/SAME DAY SURGERY:  *A responsible adult MUST accompany you at the time of discharge and stay with you for 24 hours after your surgery.  *You may NOT drive yourself home after surgery.  *You may use a taxi or ride sharing service ([employer], Uber) to return home ONLY if you are accompanied by a friend or family member.  *Instructions for resuming your medications will be provided by your surgeon.     Thank you for coming to Pre Admission testing.      If I have prescribed medication please don't forget to  at your pharmacy.      Any questions about today's visit call [phone] and leave a  message in the general mailbox.     Patient instructed to ambulate as soon as possible postoperatively to decrease thromboembolic risk.     Thank you for visiting [facility].  If you have any changes to your health condition, please call the surgeons office to alert them and give them details of your symptoms.        Preoperative Fasting Guidelines     Why must I stop eating and drinking near surgery time?  With sedation, food or liquid in your stomach can enter your lungs causing serious complications  Increases nausea and vomiting     When do I need to stop eating and drinking before my surgery?  Do not eat any food after midnight the night before your surgery/procedure.  You may have up to TEN ounces of clear liquid until TWO hours before your instructed arrival time to the hospital.  This includes water, black tea/coffee, (no milk or cream) apple juice, and electrolyte drinks (Gatorade)  You may chew gum until TWO hours before your surgery/procedure        Additional Instructions:      The Day before Surgery:  -Review your medication instructions, stop indicated medications  -You will be contacted in the evening regarding the time of your arrival to facility and surgery time     Day of Surgery:  -Review your medication instructions, take indicated medications  -Wear comfortable loose fitting clothing  -Do not use moisturizers, creams, lotions or perfume  -All jewelry and valuables should be left at home                   Preoperative Brain Exercises     What are brain exercises?  A brain exercise is any activity that engages your thinking (cognitive) skills.     What types of activities are considered brain exercises?  Jigsaw puzzles, crossword puzzles, word jumble, memory games, word search, and many more.  Many can be found free online or on your phone via a mobile caryl.     Why should I do brain exercises before my surgery?  More recent research has shown brain exercise before  surgery can lower the risk of postoperative delirium (confusion) which can be especially important for older adults.  Patients who did brain exercises for 5 to 10 minutes/day in the days before surgery, cut their risk of postoperative delirium in half up to 1 week after surgery.                         [facility]     Preoperative Deep Breathing Exercises     Why it is important to do deep breathing exercises before my surgery?  Deep breathing exercises strengthen your breathing muscles.  This helps you to recover after your surgery and decreases the chance of breathing complications.        How are the deep breathing exercises done?  Sit straight with your back supported.  Breathe in deeply and slowly through your nose. Your lower rib cage should expand and your abdomen may move forward.  Hold that breath for 3 to 5 seconds.  Breathe out through pursed lips, slowly and completely.  Rest and repeat 10 times every hour while awake.  Rest longer if you become dizzy or lightheaded.                      [facility]     Preoperative Deep Breathing Exercises     Why it is important to do deep breathing exercises before my surgery?  Deep breathing exercises strengthen your breathing muscles.  This helps you to recover after your surgery and decreases the chance of breathing complications.        How are the deep breathing exercises done?  Sit straight with your back supported.  Breathe in deeply and slowly through your nose. Your lower rib cage should expand and your abdomen may move forward.  Hold that breath for 3 to 5 seconds.  Breathe out through pursed lips, slowly and completely.  Rest and repeat 10 times every hour while awake.  Rest longer if you become dizzy or lightheaded.        Patient Information: Incentive Spirometer  What is an incentive spirometer?  An incentive spirometer is a device used before and after surgery to “exercise” your lungs.  It helps you to  take deeper breaths to expand your lungs.  Below is an example of a basic incentive spirometer.  The device you receive may differ slightly but they all function the same.    Why do I need to use an incentive spirometer?  Using your incentive spirometer prepares your lungs for surgery and helps prevent lung problems after surgery.  How do I use my incentive spirometer?  When you're using your incentive spirometer, make sure to breathe through your mouth. If you breathe through your nose, the incentive spirometer won't work properly. You can hold your nose if you have trouble.  If you feel dizzy at any time, stop and rest. Try again at a later time.  Follow the steps below:  Set up your incentive spirometer, expand the flexible tubing and connect to the outlet.  Sit upright in a chair or bed. Hold the incentive spirometer at eye level.   Put the mouthpiece in your mouth and close your lips tightly around it. Slowly breathe out (exhale) completely.  Breathe in (inhale) slowly through your mouth as deeply as you can. As you take a breath, you will see the piston rise inside the large column. While the piston rises, the indicator should move upwards. It should stay in between the 2 arrows (see Figure).  Try to get the piston as high as you can, while keeping the indicator between the arrows.   If the indicator doesn't stay between the arrows, you're breathing either too fast or too slow.  When you get it as high as you can, hold your breath for 10 seconds, or as long as possible. While you're holding your breath, the piston will slowly fall to the base of the spirometer.  Once the piston reaches the bottom of the spirometer, breathe out slowly through your mouth. Rest for a few seconds.  Repeat 10 times. Try to get the piston to the same level with each breath.  Repeat every hour while awake  You can carefully clean the outside of the mouthpiece with an alcohol wipe or soap and water.       Patient and Family Education              Ways You Can Help Prevent Blood Clots                    This handout explains some simple things you can do to help prevent blood clots.      Blood clots are blockages that can form in the body's veins. When a blood clot forms in your deep veins, it may be called a deep vein thrombosis, or DVT for short. Blood clots can happen in any part of the body where blood flows, but they are most common in the arms and legs. If a piece of a blood clot breaks free and travels to the lungs, it is called a pulmonary embolus (PE). A PE can be a very serious problem.         Being in the hospital or having surgery can raise your chances of getting a blood clot because you may not be well enough to move around as much as you normally do.         Ways you can help prevent blood clots in the hospital           Wearing SCDs. SCDs stands for Sequential Compression Devices.   SCDs are special sleeves that wrap around your legs  They attach to a pump that fills them with air to gently squeeze your legs every few minutes.   This helps return the blood in your legs to your heart.   SCDs should only be taken off when walking or bathing.   SCDs may not be comfortable, but they can help save your life.                                            Wearing compression stockings - if your doctor orders them. These special snug fitting stockings gently squeeze your legs to help blood flow.       Walking. Walking helps move the blood in your legs.   If your doctor says it is ok, try walking the halls at least   5 times a day. Ask us to help you get up, so you don't fall.      Taking any blood thinning medicines your doctor orders.        Page 1 of 2            [facility]; 3/23   Ways you can help prevent blood clots at home         Wearing compression stockings - if your doctor orders them. ? Walking - to help move the blood in your legs.       Taking any blood thinning medicines your doctor orders.      Signs of a blood clot  or PE        Tell your doctor or nurse know right away if you have of the problems listed below.    If you are at home, seek medical care right away. Call 911 for chest pain or problems breathing.                Signs of a blood clot (DVT) - such as pain,  swelling, redness or warmth in your arm or leg      Signs of a pulmonary embolism (PE) - such as chest pain or feeling short of breath

## 2025-05-08 NOTE — CPM/PAT H&P
CPM/PAT Evaluation       Name: Jaclyn Potts (Jaclyn Potts)  /Age: 1945/79 y.o.     In-Person       Chief Complaint: Tear of right rotator cuff, unspecified tear extent,     HPI  Patient is an alert and oriented 79 year old female scheduled for a  ARTHROSCOPY, SHOULDER, WITH ROTATOR CUFF REPAIR on 25 with Dr. Silva for Tear of right rotator cuff, unspecified tear extent, . PMHX includes Asthma, GERD, SANON. [place] [facility] [place] for perioperative risk stratification and optimization.     Medical History[1]    Surgical History[2]    Patient  has no history on file for sexual activity.    Family History[3]    Allergies[4]    Prior to Admission medications    Medication Sig Start Date End Date Taking? Authorizing Provider   acetaminophen (TylenoL) 325 mg tablet Take 2 tablets (650 mg) by mouth every 6 hours if needed for mild pain (1 - 3) or fever (temp greater than 38.0 C). For up to 14 days 24   Anum Wu PA-C   albuterol (Proventil HFA) 90 mcg/actuation inhaler Inhale 2 puffs 4 times a day as needed.    Historical Provider, MD   albuterol 0.63 mg/3 mL nebulizer solution Take by nebulization. 21   Historical Provider, MD   calcium carb,gluc/mag ox,gluc (CALCIUM MAGNESIUM ORAL) 1 tablet once daily.    Historical Provider, MD   doxycycline (Adoxa) 100 mg tablet Take 1 tablet (100 mg) by mouth if needed. 5/15/23   Historical Provider, MD   ergocalciferol (Vitamin D-2) 1.25 MG (23219 UT) capsule Take 1 capsule (50,000 Units) by mouth 1 (one) time per week. 25   Gilberto Corrales MD   gabapentin (Neurontin) 100 mg capsule Take 1 capsule (100 mg) by mouth 3 times a day. 25  Gilberto Corrales MD   hydroCHLOROthiazide (HYDRODiuril) 25 mg tablet TAKE 1 TABLET BY MOUTH EVERY DAY 2/10/25   Gilberto Corrales MD   ipratropium-albuteroL (Duo-Neb) 0.5-2.5 mg/3 mL nebulizer solution Take 3 mL by nebulization every 6 hours if needed for wheezing or shortness of breath.  10/6/23   Gilberto Corrales MD   lidocaine (Xylocaine) 2 % solution Take by mouth. 4/23/21   Historical Provider, MD   meloxicam (Mobic) 15 mg tablet Take 1 tablet (15 mg) by mouth once daily. 10/6/23   Gilberto Corrales MD   metroNIDAZOLE (Metrocream) 0.75 % cream Apply to face twice daily 4/23/25   Anastasia Brandon MD   multivitamin tablet Take by mouth.    Historical Provider, MD   potassium chloride CR 20 mEq ER tablet TAKE 1 TABLET BY MOUTH EVERY DAY WITH FOOD 11/12/24   Gilberto Corrales MD   tiZANidine (Zanaflex) 4 mg tablet Take 1 tablet (4 mg) by mouth every 8 hours if needed for muscle spasms. 10/6/23   Gilberto Corrales MD   triamcinolone (Kenalog) 0.1 % cream Triamcinolone Acetonide 0.1 % External Cream   Quantity: 30  Refills: 0        Start : 9-Jun-2021   Active 6/9/21   Historical Provider, MD   zolpidem (Ambien) 10 mg tablet Take 1 tablet (10 mg) by mouth as needed at bedtime for sleep. 4/7/25   Gilberto Corrales MD         Review of Systems   Constitutional: Negative for chills, decreased appetite, diaphoresis, fever and malaise/fatigue.   Eyes:  Negative for blurred vision and double vision.   Cardiovascular:  Negative for chest pain, claudication, cyanosis, dyspnea on exertion, irregular heartbeat, leg swelling, near-syncope and palpitations.   Respiratory:  Negative for cough, hemoptysis, shortness of breath and wheezing.    Endocrine: Negative for cold intolerance, heat intolerance, polydipsia, polyphagia and polyuria.   Gastrointestinal:  Negative for abdominal pain, constipation, diarrhea, dysphagia, nausea and vomiting.   Genitourinary:  Negative for bladder incontinence, dysuria, hematuria, incomplete emptying, nocturia, frequency, pelvic pain and urgency.   Neurological:  Negative for headaches, light-headedness, paresthesias, sensory change and weakness.   Psychiatric/Behavioral:  Negative for altered mental status.    Musculoskeletal: positive for myalgias, arthralgias     Vitals and nursing  note reviewed.     Physical exam  Constitutional:       Appearance: Normal appearance. She is Obese.   HENT:      Head: Normocephalic and atraumatic.      Mouth/Throat:      Mouth: Mucous membranes are moist.      Pharynx: Oropharynx is clear.   Eyes:      Extraocular Movements: Extraocular movements intact.      Conjunctiva/sclera: Conjunctivae normal.      Pupils: Pupils are equal, round, and reactive to light.   Cardiovascular:      PMI at left midclavicular line. Normal rate. Regular rhythm. Normal S1. Normal S2.       Murmurs: There is no murmur.      No gallop.  No click. No rub.       No audible carotid bruit     No lower extremity edema on exam  Pulmonary:      Effort: Pulmonary effort is normal.      Breath sounds: Normal breath sounds.   Abdominal:      General: Abdomen is flat. Bowel sounds are normal.      Palpations: Abdomen is soft and non-tender  Musculoskeletal:      Cervical back: Normal range of motion and neck supple.   Skin:     General: Skin is warm and dry.      Capillary Refill: Capillary refill takes less than 2 seconds.   Neurological:      General: No focal deficit present.      Mental Status: She is alert and oriented to person, place, and time. Mental status is at baseline.   Psychiatric:         Mood and Affect: Mood normal.         Behavior: Behavior normal.         Thought Content: Thought content normal.         Judgment: Judgment normal.     Vitals and nursing note reviewed. Physical exam within normal limits.         DASI Risk Score      Flowsheet Row Pre-Admission Testing from 5/8/2025 in [facility]   Can you take care of yourself (eat, dress, bathe, or use toilet)?  2.75 filed at 05/08/2025 1549   Can you walk indoors, such as around your house? 1.75 filed at 05/08/2025 1549   Can you walk a block or two on level ground?  2.75 filed at 05/08/2025 1549   Can you climb a flight of stairs or walk up a hill? 5.5 filed at 05/08/2025 1549   Can you run a short distance?  0 filed at 05/08/2025 1549   Can you do light work around the house like dusting or washing dishes? 2.7 filed at 05/08/2025 1549   Can you do moderate work around the house like vacuuming, sweeping floors or carrying groceries? 3.5 filed at 05/08/2025 1549   Can you do heavy work around the house like scrubbing floors or lifting and moving heavy furniture?  0 filed at 05/08/2025 1549   Can you do yard work like raking leaves, weeding or pushing a mower? 0 filed at 05/08/2025 1549   Can you have sexual relations? 5.25 filed at 05/08/2025 1549   Can you participate in moderate recreational activities like golf, bowling, dancing, doubles tennis or throwing a baseball or football? 0 filed at 05/08/2025 1549   Can you participate in strenous sports like swimming, singles tennis, football, basketball, or skiing? 0 filed at 05/08/2025 1549   DASI SCORE 24.2 filed at 05/08/2025 1549   METS Score (Will be calculated only when all the questions are answered) 5.7 filed at 05/08/2025 1549          Caphumbertoi DVT Assessment      Flowsheet Row Pre-Admission Testing from 5/8/2025 in [facility] ED to Hosp-Admission (Discharged) from 5/17/2024 in [facility] OR with Koby Bradley MD   DVT Score (IF A SCORE IS NOT CALCULATING, MUST SELECT A BMI TO COMPLETE) 8 filed at 05/08/2025 1548 6 filed at 05/17/2024 1749   Surgical Factors Major surgery planned, including arthroscopic and laproscopic (1-2 hours) filed at 05/08/2025 1548 --   BMI (BMI MUST BE CHOSEN) 31-40 (Obesity) filed at 05/08/2025 1548 31-40 (Obesity) filed at 05/17/2024 1749   RETIRED: Age -- Over 75 years filed at 05/17/2024 1749          Modified Frailty Index    No data to display       [id number] Stroke Risk Points  Current as of just now        N/A 0 to 9 Points:      Last Change: N/A          The [id number] risk score (Anneliese MANZANARES, et al. 2009. © 2010 [employer]) quantifies the risk of stroke for a  patient with atrial fibrillation. For patients without atrial fibrillation or under the age of 18 this score appears as N/A. Higher score values generally indicate higher risk of stroke.        This score is not applicable to this patient. Components are not calculated.          Revised Cardiac Risk Index      Flowsheet Row Pre-Admission Testing from 5/8/2025 in [facility]   High-Risk Surgery (Intraperitoneal, Intrathoracic,Suprainguinal vascular) 0 filed at 05/08/2025 1549   History of ischemic heart disease (History of MI, History of positive exercuse test, Current chest paint considered due to myocardial ischemia, Use of nitrate therapy, ECG with pathological Q Waves) 0 filed at 05/08/2025 1549   History of congestive heart failure (pulmonary edemia, bilateral rales or S3 gallop, Paroxysmal nocturnal dyspnea, CXR showing pulmonary vascular redistribution) 0 filed at 05/08/2025 1549   History of cerebrovascular disease (Prior TIA or stroke) 0 filed at 05/08/2025 1549   Pre-operative insulin treatment 0 filed at 05/08/2025 1549   Pre-operative creatinine>2 mg/dl 0 filed at 05/08/2025 1549   Revised Cardiac Risk Calculator 0 filed at 05/08/2025 1549          Apfel Simplified Score    No data to display       Risk Analysis Index Results This Encounter    No data found in the last 10 encounters.       Stop Bang Score      Flowsheet Row Pre-Admission Testing from 5/8/2025 in [facility] ED to Hosp-Admission (Discharged) from 5/17/2024 in [facility] with Koby Bradley MD   Do you snore loudly? 1 filed at 05/08/2025 1439 0 filed at 05/17/2024 1345   Do you often feel tired or fatigued after your sleep? 1 filed at 05/08/2025 1439 0 filed at 05/17/2024 1345   Has anyone ever observed you stop breathing in your sleep? 0 filed at 05/08/2025 1439 0 filed at 05/17/2024 1345   Do you have or are you being treated for high blood pressure? 1 filed at 05/08/2025 1439  0 filed at 05/17/2024 1345   Recent BMI (Calculated) 36.3 filed at 05/08/2025 1439 38.8 filed at 05/17/2024 1345   Is BMI greater than 35 kg/m2? 1=Yes filed at 05/08/2025 1439 1=Yes filed at 05/17/2024 1345   Age older than 50 years old? 1=Yes filed at 05/08/2025 1439 1=Yes filed at 05/17/2024 1345   Is your neck circumference greater than 17 inches (Male) or 16 inches (Female)? 0 filed at 05/08/2025 1439 --   Gender - Male 0=No filed at 05/08/2025 1439 0=No filed at 05/17/2024 1345   STOP-BANG Total Score 5 filed at 05/08/2025 1439 --          Prodigy: High Risk  Total Score: 12              Prodigy Age Score           ARISCAT Score for Postoperative Pulmonary Complications    No data to display       Priya Perioperative Risk for Myocardial Infarction or Cardiac Arrest (CESARIO)      Flowsheet Row Pre-Admission Testing from 5/8/2025 in [facility]   Calculated Age Score 1.54 filed at 05/08/2025 1549   Functional Status  0 filed at 05/08/2025 1549   ASA Class  -1.92 filed at 05/08/2025 1549   Creatinine 0 filed at 05/08/2025 1549   Type of Procedure  0.80 filed at 05/08/2025 1549   CESARIO Total Score  -4.83 filed at 05/08/2025 1549   CESARIO % 0.79 filed at 05/08/2025 1549          Assessment & Plan:    Neuro:  Insomnia (zolpidem)     HEENT/Airway:  No diagnosis or significant findings on chart review or clinical presentation and evaluation.   STOP-BANG Score-5  points high risk for JOÃO    Mallampati::  II    TM distance::  >3 FB    Neck ROM::  Full  Dentures-reports lower bridge  Crowns-denies  Implants-denies    Cardiovascular:  HTN (hydrochlorothiazide)    METS: 5.7  RCRI: 0 points, 3.9%  risk for postoperative MACE   CESARIO: 0.79% risk for postoperative MACE  EKG -normal sinus rhythm Rate- 79 No acute changes.     Pulmonary:  Moderate persistent asthma (albuterol)    ARISCAT: <26 points, 1.6% risk of in-hospital postoperative pulmonary complication  PRODIGY: Moderate risk for opioid induced respiratory  depression  Smoking History-she quit smoking 16 years ago   Discussed smoking cessation and deep breathing handout given    Renal/Urinary:  No diagnosis or significant findings on chart review or clinical presentation and evaluation, however, the patient is at increased risk of perioperative renal complications secondary to HTN. Preventative measures include BP monitoring, medication compliance, and hydration management.   CMP  Creatinine-0.67  GFR-90    Endocrine:  No diagnosis or significant findings on chart review or clinical presentation and evaluation.   ATE7L-7.3    Hematologic/Immunology:  Seroma of breast  Hysterectomy 2020  The patient is not a Jehovah’s witness and will accept blood and blood products if medically indicated.   History of previous blood transfusions No  CBC  HGB-16.3/48.8  Caprini Score 8, patient at Moderate for postoperative DVT. Pt supplied education/VTE handout  Anticoagulation use: No     Gastrointestinal:   GERD  SANON   Recreational drug use: none  Alcohol use occasional glass of wine    Infectious disease:   No diagnosis or significant findings on chart review or clinical presentation and evaluation.     Musculoskeletal:   Right Shoulder pain, history of rotator cuff repair 2020 (gabapentin)   History of right ankle surgery with hardware. Pt's right ankle lower leg are swollen (she states this is normal for her)   JHFRAT score-7  points. moderate risk for falls    Anesthesia:  ASA 3 - Patient with moderate systemic disease with functional limitations  Anticipated anesthesia-consult  History of General anesthesia- yes  Complications- she states due to asthma she needs breathing treatments before and after surgery   No family history of anesthesia complications    Labs & Imaging ordered:  EKG  Nickel/metal allergy-negative  Shellfish allergy-negative    Discussed with patient medication instructions, NPO guidelines, and any questions or concerns.   Pt expressed the need to use  nebulizer before and after surgery   time spent 45 minutes          [1]   Past Medical History:  Diagnosis Date    Other abnormal and inconclusive findings on diagnostic imaging of breast 12/09/2020    Abnormal mammogram of left breast    Other specified disorders of breast 12/09/2020    Seroma of breast    Personal history of other diseases of the circulatory system 12/09/2020    History of hypertension    Personal history of other diseases of the respiratory system 11/26/2019    History of acute bacterial sinusitis    Squamous cell carcinoma of skin of right lower limb, including hip 12/09/2020    Squamous cell carcinoma of skin of right lower extremity   [2]   Past Surgical History:  Procedure Laterality Date    HERNIA REPAIR  11/30/2020    Hernia Repair    OTHER SURGICAL HISTORY  10/19/2020    Biopsy Skin    ROTATOR CUFF REPAIR  11/30/2020    Rotator Cuff Repair    TOTAL ABDOMINAL HYSTERECTOMY  12/09/2020    Total Abdominal Hysterectomy    UMBILICAL HERNIA REPAIR  12/09/2020    Umbilical Hernia Repair   [3]   Family History  Problem Relation Name Age of Onset    Stroke Mother      Diabetes Mother      Alzheimer's disease Father      Lung cancer Sister      No Known Problems Sister      Asthma Brother      Cancer Brother      No Known Problems Brother      Breast cancer Daughter 42 40    Cancer Daughter 42     No Known Problems Daughter     [4]   Allergies  Allergen Reactions    Macrobid [Nitrofurantoin Monohyd/M-Cryst] Nausea/vomiting     Pt first discovered reaction to this medication on 9/10/24     Amoxicillin-Pot Clavulanate Itching     vomiting    Hydrocodone-Acetaminophen Rash and Itching

## 2025-05-09 LAB
ATRIAL RATE: 79 BPM
P AXIS: 56 DEGREES
P OFFSET: 201 MS
P ONSET: 141 MS
PR INTERVAL: 174 MS
Q ONSET: 228 MS
QRS COUNT: 13 BEATS
QRS DURATION: 84 MS
QT INTERVAL: 380 MS
QTC CALCULATION(BAZETT): 435 MS
QTC FREDERICIA: 416 MS
R AXIS: -22 DEGREES
T AXIS: 56 DEGREES
T OFFSET: 418 MS
VENTRICULAR RATE: 79 BPM

## 2025-05-30 ENCOUNTER — APPOINTMENT (OUTPATIENT)
Dept: PRIMARY CARE | Facility: CLINIC | Age: 80
End: 2025-05-30
Payer: COMMERCIAL

## 2025-06-18 ENCOUNTER — APPOINTMENT (OUTPATIENT)
Dept: ORTHOPEDIC SURGERY | Facility: CLINIC | Age: 80
End: 2025-06-18
Payer: COMMERCIAL

## 2025-06-25 ENCOUNTER — APPOINTMENT (OUTPATIENT)
Dept: ORTHOPEDIC SURGERY | Facility: CLINIC | Age: 80
End: 2025-06-25
Payer: COMMERCIAL

## 2025-06-27 ENCOUNTER — PRE-ADMISSION TESTING (OUTPATIENT)
Dept: PREADMISSION TESTING | Facility: HOSPITAL | Age: 80
End: 2025-06-27
Payer: COMMERCIAL

## 2025-06-27 VITALS
HEART RATE: 110 BPM | DIASTOLIC BLOOD PRESSURE: 82 MMHG | RESPIRATION RATE: 16 BRPM | SYSTOLIC BLOOD PRESSURE: 146 MMHG | TEMPERATURE: 97.9 F | OXYGEN SATURATION: 96 % | BODY MASS INDEX: 36.25 KG/M2 | WEIGHT: 197 LBS | HEIGHT: 62 IN

## 2025-06-27 DIAGNOSIS — Z01.818 PREOP TESTING: Primary | ICD-10-CM

## 2025-06-27 PROCEDURE — 99214 OFFICE O/P EST MOD 30 MIN: CPT | Performed by: NURSE PRACTITIONER

## 2025-06-27 PROCEDURE — 87081 CULTURE SCREEN ONLY: CPT | Mod: TRILAB

## 2025-06-27 RX ORDER — PREDNISONE 10 MG/1
TABLET ORAL
COMMUNITY
Start: 2025-06-13

## 2025-06-27 RX ORDER — CHLORHEXIDINE GLUCONATE ORAL RINSE 1.2 MG/ML
SOLUTION DENTAL
Qty: 473 ML | Refills: 0 | Status: SHIPPED | OUTPATIENT
Start: 2025-06-27 | End: 2025-07-03

## 2025-06-27 ASSESSMENT — ENCOUNTER SYMPTOMS
CARDIOVASCULAR NEGATIVE: 1
ENDOCRINE NEGATIVE: 1
HEMATOLOGIC/LYMPHATIC NEGATIVE: 1
ARTHRALGIAS: 1
EYES NEGATIVE: 1
CONSTITUTIONAL NEGATIVE: 1
NEUROLOGICAL NEGATIVE: 1
SHORTNESS OF BREATH: 1
GASTROINTESTINAL NEGATIVE: 1
PSYCHIATRIC NEGATIVE: 1

## 2025-06-27 ASSESSMENT — DUKE ACTIVITY SCORE INDEX (DASI)
CAN YOU RUN A SHORT DISTANCE: NO
CAN YOU CLIMB A FLIGHT OF STAIRS OR WALK UP A HILL: YES
CAN YOU DO YARD WORK LIKE RAKING LEAVES, WEEDING OR PUSHING A MOWER: NO
CAN YOU HAVE SEXUAL RELATIONS: YES
CAN YOU PARTICIPATE IN STRENOUS SPORTS LIKE SWIMMING, SINGLES TENNIS, FOOTBALL, BASKETBALL, OR SKIING: NO
DASI METS SCORE: 5.7
CAN YOU PARTICIPATE IN MODERATE RECREATIONAL ACTIVITIES LIKE GOLF, BOWLING, DANCING, DOUBLES TENNIS OR THROWING A BASEBALL OR FOOTBALL: NO
CAN YOU TAKE CARE OF YOURSELF (EAT, DRESS, BATHE, OR USE TOILET): YES
CAN YOU WALK INDOORS, SUCH AS AROUND YOUR HOUSE: YES
CAN YOU DO MODERATE WORK AROUND THE HOUSE LIKE VACUUMING, SWEEPING FLOORS OR CARRYING GROCERIES: YES
TOTAL_SCORE: 24.2
CAN YOU DO LIGHT WORK AROUND THE HOUSE LIKE DUSTING OR WASHING DISHES: YES
CAN YOU DO HEAVY WORK AROUND THE HOUSE LIKE SCRUBBING FLOORS OR LIFTING AND MOVING HEAVY FURNITURE: NO
CAN YOU WALK A BLOCK OR TWO ON LEVEL GROUND: YES

## 2025-06-27 NOTE — CPM/PAT H&P
CPM/PAT Evaluation       Name: Jaclyn Carson (Jaclyn Carson)  /Age: 1945/79 y.o.     In-Person       Chief Complaint: right shoulder reduced ROM     HPI    Pt is a 79 year old female with a right shoulder rotator cuff tear. Pt fractured her right proximal humerus May 2024. Pt had an open reduction internal fixation of right humerus on 2024. Pt stated she has healed from the surgery but she is now unable to raise her right arm above shoulder level. Pt denies right shoulder or right arm pain. The patient is not happy with the loss of ROM in her right shoulder. Pt denies weakness, numbness, or tingling in her right hand. Pt completed imaging and was examined by her surgeon. Pt was scheduled for right shoulder arthroscopic rotator cuff repair. Pt denies CP, SOB, or dizziness.     Past Medical History:   Diagnosis Date    Asthma     Chronic bronchitis (Multi)     Fatty liver     GERD (gastroesophageal reflux disease)     Hx of leukocytosis     Other abnormal and inconclusive findings on diagnostic imaging of breast 2020    Abnormal mammogram of left breast    Other specified disorders of breast 2020    Seroma of breast    Personal history of other diseases of the circulatory system 2020    History of hypertension    Personal history of other diseases of the respiratory system 2019    History of acute bacterial sinusitis    Prediabetes     Squamous cell carcinoma of skin of right lower limb, including hip 2020    Squamous cell carcinoma of skin of right lower extremity       Past Surgical History:   Procedure Laterality Date    ANKLE SURGERY      HERNIA REPAIR  2020    Hernia Repair    OTHER SURGICAL HISTORY  10/19/2020    Biopsy Skin    ROTATOR CUFF REPAIR Bilateral 2020    Rotator Cuff Repair    TOTAL ABDOMINAL HYSTERECTOMY  2020    Total Abdominal Hysterectomy    UMBILICAL HERNIA REPAIR  2020    Umbilical Hernia Repair     Social History      Tobacco Use    Smoking status: Former     Current packs/day: 0.00     Average packs/day: 0.3 packs/day for 47.0 years (11.8 ttl pk-yrs)     Types: Cigarettes     Start date: 1963     Quit date: 2010     Years since quitting: 15.4    Smokeless tobacco: Never   Substance Use Topics    Alcohol use: Not Currently     Comment: rarely     Social History     Substance and Sexual Activity   Drug Use Never     Patient  has no history on file for sexual activity.    Family History[1]    Allergies   Allergen Reactions    Macrobid [Nitrofurantoin Monohyd/M-Cryst] Nausea/vomiting     Pt first discovered reaction to this medication on 9/10/24     Amoxicillin-Pot Clavulanate Itching     vomiting    Hydrocodone-Acetaminophen Rash and Itching     Current Outpatient Medications   Medication Sig Dispense Refill    predniSONE (Deltasone) 10 mg tablet Takes prn      acetaminophen (TylenoL) 325 mg tablet Take 2 tablets (650 mg) by mouth every 6 hours if needed for mild pain (1 - 3) or fever (temp greater than 38.0 C). For up to 14 days 90 tablet 0    albuterol (Proventil HFA) 90 mcg/actuation inhaler Inhale 2 puffs 4 times a day as needed.      albuterol 0.63 mg/3 mL nebulizer solution Take by nebulization every 6 hours if needed.      calcium carb,gluc/mag ox,gluc (CALCIUM MAGNESIUM ORAL) 1 tablet once daily.      chlorhexidine (Peridex) 0.12 % solution Use as directed. 473 mL 0    doxycycline (Adoxa) 100 mg tablet Take 1 tablet (100 mg) by mouth if needed.      ergocalciferol (Vitamin D-2) 1.25 MG (56595 UT) capsule Take 1 capsule (50,000 Units) by mouth 1 (one) time per week. 12 capsule 3    gabapentin (Neurontin) 100 mg capsule Take 1 capsule (100 mg) by mouth 3 times a day. 270 capsule 3    hydroCHLOROthiazide (HYDRODiuril) 25 mg tablet TAKE 1 TABLET BY MOUTH EVERY DAY 90 tablet 1    ipratropium-albuteroL (Duo-Neb) 0.5-2.5 mg/3 mL nebulizer solution Take 3 mL by nebulization every 6 hours if needed for wheezing or shortness of  breath. 180 mL     lidocaine (Xylocaine) 2 % solution Take by mouth if needed. Dental work      meloxicam (Mobic) 15 mg tablet Take 1 tablet (15 mg) by mouth once daily. (Patient taking differently: Take 1 tablet (15 mg) by mouth once daily as needed.)      metroNIDAZOLE (Metrocream) 0.75 % cream Apply to face twice daily (Patient taking differently: Apply topically 2 times a day as needed. Apply to face twice daily) 45 g 11    potassium chloride CR 20 mEq ER tablet TAKE 1 TABLET BY MOUTH EVERY DAY WITH FOOD 90 tablet 3    tiZANidine (Zanaflex) 4 mg tablet Take 1 tablet (4 mg) by mouth every 8 hours if needed for muscle spasms. 60 tablet 1    triamcinolone (Kenalog) 0.1 % cream Apply topically if needed.      zolpidem (Ambien) 10 mg tablet Take 1 tablet (10 mg) by mouth as needed at bedtime for sleep. (Patient taking differently: Take 1 tablet (10 mg) by mouth once daily at bedtime.) 90 tablet 1     No current facility-administered medications for this visit.     Review of Systems   Constitutional: Negative.    HENT: Negative.     Eyes: Negative.    Respiratory:  Positive for shortness of breath (with long distance walking).    Cardiovascular: Negative.    Gastrointestinal: Negative.    Endocrine: Negative.    Genitourinary: Negative.    Musculoskeletal:  Positive for arthralgias.        Reduced right shoulder ROM   Skin: Negative.    Neurological: Negative.    Hematological: Negative.    Psychiatric/Behavioral: Negative.       Physical Exam  Vitals reviewed.   Constitutional:       Appearance: She is obese.   HENT:      Head: Normocephalic and atraumatic.      Nose: Nose normal.      Mouth/Throat:      Mouth: Mucous membranes are moist.      Pharynx: Oropharynx is clear.   Eyes:      Extraocular Movements: Extraocular movements intact.      Conjunctiva/sclera: Conjunctivae normal.      Pupils: Pupils are equal, round, and reactive to light.   Cardiovascular:      Rate and Rhythm: Normal rate and regular rhythm.      " Pulses: Normal pulses.      Heart sounds: Normal heart sounds.   Pulmonary:      Effort: Pulmonary effort is normal. No respiratory distress.      Breath sounds: Normal breath sounds. No wheezing, rhonchi or rales.   Abdominal:      General: Bowel sounds are normal.      Palpations: Abdomen is soft.      Tenderness: There is no abdominal tenderness. There is no guarding or rebound.   Musculoskeletal:      Cervical back: Normal range of motion and neck supple.      Right lower leg: Edema (trace non-pitting edema) present.      Left lower leg: Edema (trace non-pitting edema) present.      Comments: Reduced right shoulder ROM. Pt is unable to raise her right arm above her head. Pt denies pain    Skin:     General: Skin is warm and dry.   Neurological:      General: No focal deficit present.      Mental Status: She is alert and oriented to person, place, and time. Mental status is at baseline.   Psychiatric:         Mood and Affect: Mood normal.         Behavior: Behavior normal.         Thought Content: Thought content normal.         Judgment: Judgment normal.          PAT AIRWAY:   Airway:     Mallampati::  II    TM distance::  >3 FB    Neck ROM::  Full   Bridge      Visit Vitals  /82   Pulse 110   Temp 36.6 °C (97.9 °F) (Temporal)   Resp 16   Ht 1.575 m (5' 2\")   Wt 89.4 kg (197 lb)   LMP 01/01/1977 (Approximate)   SpO2 96%   BMI 36.03 kg/m²   OB Status Hysterectomy   Smoking Status Former   BSA 1.98 m²     ASA: 3   CHADS: 4%  RCRI: 0.4%  Ariscat: 1.6%  DASI Risk Score      Flowsheet Row Pre-Admission Testing from 6/27/2025 in [facility] Pre-Admission Testing from 5/8/2025 in [facility]   Can you take care of yourself (eat, dress, bathe, or use toilet)?  2.75 filed at 06/27/2025 1012 2.75 filed at 05/08/2025 1549   Can you walk indoors, such as around your house? 1.75 filed at 06/27/2025 1012 1.75 filed at 05/08/2025 1549   Can you walk a block or two on level ground?  2.75 " filed at 06/27/2025 1012 2.75 filed at 05/08/2025 1549   Can you climb a flight of stairs or walk up a hill? 5.5 filed at 06/27/2025 1012 5.5 filed at 05/08/2025 1549   Can you run a short distance? 0 filed at 06/27/2025 1012 0 filed at 05/08/2025 1549   Can you do light work around the house like dusting or washing dishes? 2.7 filed at 06/27/2025 1012 2.7 filed at 05/08/2025 1549   Can you do moderate work around the house like vacuuming, sweeping floors or carrying groceries? 3.5 filed at 06/27/2025 1012 3.5 filed at 05/08/2025 1549   Can you do heavy work around the house like scrubbing floors or lifting and moving heavy furniture?  0 filed at 06/27/2025 1012 0 filed at 05/08/2025 1549   Can you do yard work like raking leaves, weeding or pushing a mower? 0 filed at 06/27/2025 1012 0 filed at 05/08/2025 1549   Can you have sexual relations? 5.25 filed at 06/27/2025 1012 5.25 filed at 05/08/2025 1549   Can you participate in moderate recreational activities like golf, bowling, dancing, doubles tennis or throwing a baseball or football? 0 filed at 06/27/2025 1012 0 filed at 05/08/2025 1549   Can you participate in strenous sports like swimming, singles tennis, football, basketball, or skiing? 0 filed at 06/27/2025 1012 0 filed at 05/08/2025 1549   DASI SCORE 24.2 filed at 06/27/2025 1012 24.2 filed at 05/08/2025 1549   METS Score (Will be calculated only when all the questions are answered) 5.7 filed at 06/27/2025 1012 5.7 filed at 05/08/2025 1549          Caprini DVT Assessment      Flowsheet Row Pre-Admission Testing from 5/8/2025 in [facility] ED to Hosp-Admission (Discharged) from 5/17/2024 in [facility] with Koby Bradley MD   DVT Score (IF A SCORE IS NOT CALCULATING, MUST SELECT A BMI TO COMPLETE) 8 filed at 05/08/2025 1548 6 filed at 05/17/2024 1749   Surgical Factors Major surgery planned, including arthroscopic and laproscopic (1-2 hours) filed at 05/08/2025  1548 --   BMI (BMI MUST BE CHOSEN) 31-40 (Obesity) filed at 05/08/2025 1548 31-40 (Obesity) filed at 05/17/2024 1749   RETIRED: Age -- Over 75 years filed at 05/17/2024 1749          Modified Frailty Index    No data to display       [id number] Stroke Risk Points  Current as of a minute ago        N/A 0 to 9 Points:      Last Change: N/A          The [id number] risk score (Lip LEIGHTON, et al. 2009. © 2010 [employer]) quantifies the risk of stroke for a patient with atrial fibrillation. For patients without atrial fibrillation or under the age of 18 this score appears as N/A. Higher score values generally indicate higher risk of stroke.        This score is not applicable to this patient. Components are not calculated.          Revised Cardiac Risk Index      Flowsheet Row Pre-Admission Testing from 6/27/2025 in [facility] Pre-Admission Testing from 5/8/2025 in [facility]   High-Risk Surgery (Intraperitoneal, Intrathoracic,Suprainguinal vascular) 0 filed at 06/27/2025 1126 0 filed at 05/08/2025 1549   History of ischemic heart disease (History of MI, History of positive exercuse test, Current chest paint considered due to myocardial ischemia, Use of nitrate therapy, ECG with pathological Q Waves) 0 filed at 06/27/2025 1126 0 filed at 05/08/2025 1549   History of congestive heart failure (pulmonary edemia, bilateral rales or S3 gallop, Paroxysmal nocturnal dyspnea, CXR showing pulmonary vascular redistribution) 0 filed at 06/27/2025 1126 0 filed at 05/08/2025 1549   History of cerebrovascular disease (Prior TIA or stroke) 0 filed at 06/27/2025 1126 0 filed at 05/08/2025 1549   Pre-operative insulin treatment 0 filed at 06/27/2025 1126 0 filed at 05/08/2025 1549   Pre-operative creatinine>2 mg/dl 0 filed at 06/27/2025 1126 0 filed at 05/08/2025 1549   Revised Cardiac Risk Calculator 0 filed at 06/27/2025 1126 0 filed at 05/08/2025 1549          Apfel Simplified  Score    No data to display       Risk Analysis Index Results This Encounter    No data found in the last 10 encounters.       Stop Bang Score      Flowsheet Row Pre-Admission Testing from 6/27/2025 in [facility] Pre-Admission Testing from 5/8/2025 in [facility]   Do you snore loudly? 1 filed at 06/27/2025 1013 1 filed at 05/08/2025 1439   Do you often feel tired or fatigued after your sleep? 1 filed at 06/27/2025 1013 1 filed at 05/08/2025 1439   Has anyone ever observed you stop breathing in your sleep? 0 filed at 06/27/2025 1013 0 filed at 05/08/2025 1439   Do you have or are you being treated for high blood pressure? 1 filed at 06/27/2025 1013 1 filed at 05/08/2025 1439   Recent BMI (Calculated) 36 filed at 06/27/2025 1013 36.3 filed at 05/08/2025 1439   Is BMI greater than 35 kg/m2? 1=Yes filed at 06/27/2025 1013 1=Yes filed at 05/08/2025 1439   Age older than 50 years old? 1=Yes filed at 06/27/2025 1013 1=Yes filed at 05/08/2025 1439   Is your neck circumference greater than 17 inches (Male) or 16 inches (Female)? 1 filed at 06/27/2025 1013 0 filed at 05/08/2025 1439   Gender - Male 0=No filed at 06/27/2025 1013 0=No filed at 05/08/2025 1439   STOP-BANG Total Score 6 filed at 06/27/2025 1013 5 filed at 05/08/2025 1439          Prodigy: High Risk  Total Score: 12              Prodigy Age Score           ARISCAT Score for Postoperative Pulmonary Complications      Flowsheet Row Pre-Admission Testing from 5/8/2025 in [facility]   Age Calculated Score 3 filed at 05/08/2025 1550   Preoperative SpO2 0 filed at 05/08/2025 1550   Respiratory infection in the last month Either upper or lower (i.e., URI, bronchitis, pneumonia), with fever and antibiotic treatment 0 filed at 05/08/2025 1550   Preoperative anemia (Hgb less than 10 g/dl) 0 filed at 05/08/2025 1550   Surgical incision  0 filed at 05/08/2025 1550   Duration of surgery  0 filed at 05/08/2025 1550   Emergency  Procedure  0 filed at 05/08/2025 1550   ARISCAT Total Score  3 filed at 05/08/2025 1550          Priya Perioperative Risk for Myocardial Infarction or Cardiac Arrest (CESARIO)      Flowsheet Row Pre-Admission Testing from 5/8/2025 in [facility]   Calculated Age Score 1.54 filed at 05/08/2025 1549   Functional Status  0 filed at 05/08/2025 1549   ASA Class  -1.92 filed at 05/08/2025 1549   Creatinine 0 filed at 05/08/2025 1549   Type of Procedure  0.80 filed at 05/08/2025 1549   CESARIO Total Score  -4.83 filed at 05/08/2025 1549   CESARIO % 0.79 filed at 05/08/2025 1549            Assessment and Plan:     Tear of right rotator cuff, unspecified tear extent, unspecified whether traumatic: ARTHROSCOPY, SHOULDER, WITH ROTATOR CUFF REPAIR right.  HTN: Pt is taking hydrochlorothiazide.   Asthma/chronic bronchitis: Pt uses her albuterol inhaler occasionally. Pt is followed by a pulmonologist.  Pt stated when she feels she is experiencing increased SOB and an exacerbation of her chronic bronchitis she takes oral prednisone, doxycycline, and uses her Duo-neb nebulizer. Pt stated her breathing is at her baseline.   Prediabetic: 4/26/2025 HgbA1C: 6.3  History of leukocytosis: 4/26/2025 WBC: 11.5. Pt reports she has had a mildly elevated WBC count for several years.   BMI: 36.03    BMP, CBC, and HgbA1C completed on 4/26/2025.  EKG was completed on 5/8/2025.    BRIGHT Munroe-CNP           [1]   Family History  Problem Relation Name Age of Onset    Stroke Mother      Diabetes Mother      Alzheimer's disease Father      Lung cancer Sister      No Known Problems Sister      Asthma Brother      Cancer Brother      No Known Problems Brother      Breast cancer Daughter 42 40    Cancer Daughter 42     No Known Problems Daughter

## 2025-06-27 NOTE — PREPROCEDURE INSTRUCTIONS
Preoperative Fasting Guidelines    Why must I stop eating and drinking near surgery time?  With sedation, food or liquid in your stomach can enter your lungs causing serious complications  Increases nausea and vomiting    When do I need to stop eating and drinking before my surgery?  Do not eat any food after midnight the night before your surgery/procedure.  You may have up to 13.5 ounces of clear liquid until TWO hours before your instructed arrival time to the hospital.  This includes water, black tea/coffee, (no milk or cream) apple juice, and electrolyte drinks (Gatorade)  You may chew gum until TWO hours before your surgery/procedure    PAT DISCHARGE INSTRUCTIONS    The Same Day Surgery (SDS) Department of the hospital where your procedure will be performed will contact you after 2:00 PM the day before your surgery. If you are scheduled on a Monday, or a Tuesday following a Monday holiday, they will call on the last business day prior to your surgery.  Please check your voicemail for any missed messages.      [facility]  [address] [place] [address]  [phone]    Please let your surgeon know if:      You develop any open sores, shingles, burning or painful urination as these may increase your risk of an infection.   You no longer wish to have the surgery.   Any other personal circumstances change that may lead to the need to cancel or defer this surgery-such as being sick or getting admitted to any hospital within one week of your planned procedure.    Your contact details change, such as a change of address or phone number.    Starting now:     Please DO NOT drink alcohol or smoke for 24 hours before surgery. It is well known that quitting smoking can make a huge difference to your health and recovery from surgery. The longer you abstain from smoking, the better your chances of a healthy recovery. If you need help with quitting, call 1-465-QUIT-NOW to be  connected to a trained counselor who will discuss the best methods to help you quit.     Before your surgery:    Please stop all supplements 7 days prior to surgery. Or as directed by your surgeon.   Please stop taking NSAID pain medicine such as Advil and Motrin 7 days before surgery.    If you develop any fever, cough, cold, rashes, cuts, scratches, scrapes, urinary symptoms or infection anywhere on your body (including teeth and gums) prior to surgery, please call your surgeon’s office as soon as possible. This may require treatment to reduce the chance of cancellation on the day of surgery.    The day before your surgery:   DIET- Please follow the diet instructions at the top of your packet.   Get a good night’s rest.  Use the special soap for bathing if you have been instructed to use one.    Scheduled surgery times may change and you will be notified if this occurs - please check your personal voicemail for any updates.     On the morning of surgery:   Wear comfortable, loose fitting clothes which open in the front. Please do not wear moisturizers, creams, lotions, makeup or perfume.    Please bring with you to surgery:   Photo ID and insurance card   Current list of medicines and allergies   Pacemaker/ Defibrillator/Heart stent cards   CPAP machine and mask    Slings/ splints/ crutches   A copy of your complete advanced directive/DHPOA.    Please do NOT bring with you to surgery:   All jewelry and valuables should be left at home.   Prosthetic devices such as contact lenses, hearing aids, dentures, eyelash extensions, hairpins and body piercings must be removed prior to going in to the surgical suite.    After outpatient surgery:   A responsible adult MUST accompany you at the time of discharge and stay with you for 24 hours after your surgery. You may NOT drive yourself home after surgery.    Do not drive, operate machinery, make critical decisions or do activities that require co-ordination or balance until  after a night’s sleep.   Do not drink alcoholic beverages for 24 hours.   Instructions for resuming your medications will be provided by your surgeon.    CALL YOUR DOCTOR AFTER SURGERY IF YOU HAVE:     Chills and/or a fever of 101° F or higher.    Redness, swelling, pus or drainage from your surgical wound or a bad smell from the wound.    Lightheadedness, fainting or confusion.    Persistent vomiting (throwing up) and are not able to eat or drink for 12 hours.    Three or more loose, watery bowel movements in 24 hours (diarrhea).   Difficulty or pain while urinating( after non-urological surgery)    Pain and swelling in your legs, especially if it is only on one side.    Difficulty breathing or are breathing faster than normal.    Any new concerning symptoms.      Patient Information: Pre-Operative Infection Prevention Measures     Why did I have my nose, under my arms, and groin swabbed?  The purpose of the swab is to identify Staphylococcus aureus inside your nose or on your skin.  The swab was sent to the laboratory for culture.  A positive swab/culture for Staphylococcus aureus is called colonization or carriage.      What is Staphylococcus aureus?  Staphylococcus aureus, also known as “staph”, is a germ found on the skin or in the nose of healthy people.  Sometimes Staphylococcus aureus can get into the body and cause an infection.  This can be minor (such as pimples, boils, or other skin problems).  It might also be serious (such as a blood infection, pneumonia, or a surgical site infection).    What is Staphylococcus aureus colonization or carriage?  Colonization or carriage means that a person has the germ but is not sick from it.  These bacteria can be spread on the hands or when breathing or sneezing.    How is Staphylococcus aureus spread?  It is most often spread by close contact with a person or item that carries it.    What happens if my culture is positive for Staphylococcus aureus?  Your  doctor/medical team will use this information to guide any antibiotic treatment which may be necessary.  Regardless of the culture results, we will clean the inside of your nose with a betadine swab just before you have your surgery.      Will I get an infection if I have Staphylococcus aureus in my nose or on my skin?  Anyone can get an infection with Staphylococcus aureus.  However, the best way to reduce your risk of infection is to follow the instructions provided to you for the use of your CHG soap and dental rinse.        Patient Information: Oral/Dental Rinse    What is oral/dental rinse?   It is a mouthwash. It is a way of cleaning the mouth with a germ-killing solution before your surgery.  The solution contains chlorhexidine, commonly known as CHG.   It is used inside the mouth to kill a bacteria known as Staphylococcus aureus.  Let your doctor know if you are allergic to Chlorhexidine.    Why do I need to use CHG oral/dental rinse?  The CHG oral/dental rinse helps to kill a bacteria in your mouth known as Staphylococcus aureus.     This reduces the risk of infection at the surgical site.      Using your CHG oral/dental rinse  STEPS:  Use your CHG oral/dental rinse after you brush your teeth the night before (at bedtime) and the morning of your surgery.  Follow all directions on your prescription label.    Use the cap on the container to measure 15ml   Swish (gargle if you can) the mouthwash in your mouth for at least 30 seconds, (do not swallow) and spit out  After you use your CHG rinse, do not rinse your mouth with water, drink or eat.  Please refer to the prescription label for the appropriate time to resume oral intake      What side effects might I have using the CHG oral/dental rinse?  CHG rinse will stick to plaque on the teeth.  Brush and floss just before use.  Teeth brushing will help avoid staining of plaque during use.      Patient Information: Home Preoperative Antibacterial Shower      What  is a home preoperative antibacterial shower?  This shower is a way of cleaning the skin with a germ-killing solution before surgery.  The solution contains chlorhexidine, commonly known as CHG.  CHG is a skin cleanser with germ-killing ability.  Let your doctor know if you are allergic to chlorhexidine.    Why do I need to take a preoperative antibacterial shower?  Skin is not sterile.  It is best to try to make your skin as free of germs as possible before surgery.  Proper cleansing with a germ-killing soap before surgery can lower the number of germs on your skin.  This helps to reduce the risk of infection at the surgical site.  Following the instructions listed below will help you prepare your skin for surgery.      How do I use the solution?  Steps:  Begin using your CHG soap 5 days before your scheduled surgery on ________________________.    First, wash and rinse your hair using the CHG soap. Keep CHG soap away from ear canals and eyes.  Rinse completely, do not condition.  Hair extensions should be removed.  Wash your face with your normal soap and rinse.    Apply the CHG solution to a clean wet washcloth.  Turn the water off or move away from the water spray to avoid premature rinsing of the CHG soap as you are applying.   Firmly lather your entire body from the neck down.  Do not use on your face.  Pay special attention to the area(s) where your incision(s) will be located unless they are on your face.  Avoid scrubbing your skin too hard.  The important point is to have the CHG soap sit on your skin for 3 minutes.    When the 3 minutes are up, turn on the water and rinse the CHG solution off your body completely.   DO NOT wash with regular soap after you have used the CHG soap solution  Pat yourself dry with a clean, freshly-laundered towel.  DO NOT apply powders, deodorants, or lotions.  Dress in clean, freshly laundered nightclothes.    Be sure to sleep with clean, freshly laundered sheets.  Be aware  that CHG will cause stains on fabrics; if you wash them with bleach after use.  Rinse your washcloth and other linens that have contact with CHG completely.  Use only non-chlorine detergents to launder the items used.   The morning of surgery is the fifth day.  Repeat the above steps and dress in clean comfortable clothing     Whom should I contact if I have any questions regarding the use of CHG soap?  Call the [facility] at [phone] if you have any questions.                    Medication List            Accurate as of June 27, 2025 10:18 AM. Always use your most recent med list.                acetaminophen 325 mg tablet  Commonly known as: TylenoL  Take 2 tablets (650 mg) by mouth every 6 hours if needed for mild pain (1 - 3) or fever (temp greater than 38.0 C). For up to 14 days  Medication Adjustments for Surgery: Take/Use as prescribed     CALCIUM MAGNESIUM ORAL  Additional Medication Adjustments for Surgery: Take last dose 7 days before surgery     chlorhexidine 0.12 % solution  Commonly known as: Peridex  Use as directed.  Medication Adjustments for Surgery: Take/Use as prescribed     doxycycline 100 mg tablet  Commonly known as: Adoxa  Medication Adjustments for Surgery: Take/Use as prescribed     ergocalciferol 1250 mcg (50,000 units) capsule  Commonly known as: Vitamin D-2  Take 1 capsule (50,000 Units) by mouth 1 (one) time per week.  Additional Medication Adjustments for Surgery: Take last dose 7 days before surgery     gabapentin 100 mg capsule  Commonly known as: Neurontin  Take 1 capsule (100 mg) by mouth 3 times a day.  Medication Adjustments for Surgery: Take/Use as prescribed     hydroCHLOROthiazide 25 mg tablet  Commonly known as: HYDRODiuril  TAKE 1 TABLET BY MOUTH EVERY DAY  Medication Adjustments for Surgery: Take/Use as prescribed     ipratropium-albuteroL 0.5-2.5 mg/3 mL nebulizer solution  Commonly known as:  Duo-Neb  Take 3 mL by nebulization every 6 hours if needed for wheezing or shortness of breath.  Medication Adjustments for Surgery: Take/Use as prescribed     lidocaine 2 % solution  Commonly known as: Xylocaine  Medication Adjustments for Surgery: Do Not take on the morning of surgery     meloxicam 15 mg tablet  Commonly known as: Mobic  Take 1 tablet (15 mg) by mouth once daily.  Additional Medication Adjustments for Surgery: Take last dose 7 days before surgery     metroNIDAZOLE 0.75 % cream  Commonly known as: Metrocream  Apply to face twice daily  Medication Adjustments for Surgery: Take/Use as prescribed     potassium chloride CR 20 mEq ER tablet  Commonly known as: Klor-Con M20  TAKE 1 TABLET BY MOUTH EVERY DAY WITH FOOD  Medication Adjustments for Surgery: Take/Use as prescribed     predniSONE 10 mg tablet  Commonly known as: Deltasone  Medication Adjustments for Surgery: Take/Use as prescribed     * albuterol 0.63 mg/3 mL nebulizer solution  Medication Adjustments for Surgery: Take/Use as prescribed     * Proventil HFA 90 mcg/actuation inhaler  Generic drug: albuterol  Medication Adjustments for Surgery: Take/Use as prescribed     tiZANidine 4 mg tablet  Commonly known as: Zanaflex  Take 1 tablet (4 mg) by mouth every 8 hours if needed for muscle spasms.  Medication Adjustments for Surgery: Take/Use as prescribed     triamcinolone 0.1 % cream  Commonly known as: Kenalog  Medication Adjustments for Surgery: Take/Use as prescribed     zolpidem 10 mg tablet  Commonly known as: Ambien  Take 1 tablet (10 mg) by mouth as needed at bedtime for sleep.  Medication Adjustments for Surgery: Take/Use as prescribed           * This list has 2 medication(s) that are the same as other medications prescribed for you. Read the directions carefully, and ask your doctor or other care provider to review them with you.                                Patient and Family Education             Ways You Can Help Prevent Blood Clots              This handout explains some simple things you can do to help prevent blood clots.      Blood clots are blockages that can form in the body's veins. When a blood clot forms in your deep veins, it may be called a deep vein thrombosis, or DVT for short. Blood clots can happen in any part of the body where blood flows, but they are most common in the arms and legs. If a piece of a blood clot breaks free and travels to the lungs, it is called a pulmonary embolus (PE). A PE can be a very serious problem.         Being in the hospital or having surgery can raise your chances of getting a blood clot because you may not be well enough to move around as much as you normally do.         Ways you can help prevent blood clots in the hospital         Wearing SCDs. SCDs stands for Sequential Compression Devices.   SCDs are special sleeves that wrap around your legs  They attach to a pump that fills them with air to gently squeeze your legs every few minutes.   This helps return the blood in your legs to your heart.   SCDs should only be taken off when walking or bathing.   SCDs may not be comfortable, but they can help save your life.               Wearing compression stockings - if your doctor orders them. These special snug fitting stockings gently squeeze your legs to help blood flow.       Walking. Walking helps move the blood in your legs.   If your doctor says it is ok, try walking the halls at least   5 times a day. Ask us to help you get up, so you don't fall.      Taking any blood thinning medicines your doctor orders.        Page 1 of 2     [facility]; 3/23   Ways you can help prevent blood clots at home       Wearing compression stockings - if your doctor orders them. ? Walking - to help move the blood in your legs.       Taking any blood thinning medicines your doctor orders.      Signs of a blood clot or PE      Tell your doctor or nurse know right away if you have of the problems listed below.     If you are at home, seek medical care right away. Call 911 for chest pain or problems breathing.               Signs of a blood clot (DVT) - such as pain,  swelling, redness or warmth in your arm or leg      Signs of a pulmonary embolism (PE) - such as chest     pain or feeling short of breath              [facility]    Preoperative Deep Breathing Exercises    Why it is important to do deep breathing exercises before my surgery?  Deep breathing exercises strengthen your breathing muscles.  This helps you to recover after your surgery and decreases the chance of breathing complications.      How are the deep breathing exercises done?  Sit straight with your back supported.  Breathe in deeply and slowly through your nose. Your lower rib cage should expand and your abdomen may move forward.  Hold that breath for 3 to 5 seconds.  Breathe out through pursed lips, slowly and completely.  Rest and repeat 10 times every hour while awake.  Rest longer if you become dizzy or lightheaded.      Patient Information: Incentive Spirometer  What is an incentive spirometer?  An incentive spirometer is a device used before and after surgery to “exercise” your lungs.  It helps you to take deeper breaths to expand your lungs.  Below is an example of a basic incentive spirometer.  The device you receive may differ slightly but they all function the same.    Why do I need to use an incentive spirometer?  Using your incentive spirometer prepares your lungs for surgery and helps prevent lung problems after surgery.  How do I use my incentive spirometer?  When you're using your incentive spirometer, make sure to breathe through your mouth. If you breathe through your nose, the incentive spirometer won't work properly. You can hold your nose if you have trouble.  If you feel dizzy at any time, stop and rest. Try again at a later time.  Follow the steps below:  Set up your incentive spirometer, expand the flexible  tubing and connect to the outlet.  Sit upright in a chair or bed. Hold the incentive spirometer at eye level.   Put the mouthpiece in your mouth and close your lips tightly around it. Slowly breathe out (exhale) completely.  Breathe in (inhale) slowly through your mouth as deeply as you can. As you take a breath, you will see the piston rise inside the large column. While the piston rises, the indicator should move upwards. It should stay in between the 2 arrows (see Figure).  Try to get the piston as high as you can, while keeping the indicator between the arrows.   If the indicator doesn't stay between the arrows, you're breathing either too fast or too slow.  When you get it as high as you can, hold your breath for 10 seconds, or as long as possible. While you're holding your breath, the piston will slowly fall to the base of the spirometer.  Once the piston reaches the bottom of the spirometer, breathe out slowly through your mouth. Rest for a few seconds.  Repeat 10 times. Try to get the piston to the same level with each breath.  Repeat every hour while awake  You can carefully clean the outside of the mouthpiece with an alcohol wipe or soap and water.

## 2025-06-27 NOTE — H&P (VIEW-ONLY)
CPM/PAT Evaluation       Name: Jaclyn Carson (Jaclyn Carson)  /Age: 1945/79 y.o.     In-Person       Chief Complaint: right shoulder reduced ROM     HPI    Pt is a 79 year old female with a right shoulder rotator cuff tear. Pt fractured her right proximal humerus May 2024. Pt had an open reduction internal fixation of right humerus on 2024. Pt stated she has healed from the surgery but she is now unable to raise her right arm above shoulder level. Pt denies right shoulder or right arm pain. The patient is not happy with the loss of ROM in her right shoulder. Pt denies weakness, numbness, or tingling in her right hand. Pt completed imaging and was examined by her surgeon. Pt was scheduled for right shoulder arthroscopic rotator cuff repair. Pt denies CP, SOB, or dizziness.     Past Medical History:   Diagnosis Date    Asthma     Chronic bronchitis (Multi)     Fatty liver     GERD (gastroesophageal reflux disease)     Hx of leukocytosis     Other abnormal and inconclusive findings on diagnostic imaging of breast 2020    Abnormal mammogram of left breast    Other specified disorders of breast 2020    Seroma of breast    Personal history of other diseases of the circulatory system 2020    History of hypertension    Personal history of other diseases of the respiratory system 2019    History of acute bacterial sinusitis    Prediabetes     Squamous cell carcinoma of skin of right lower limb, including hip 2020    Squamous cell carcinoma of skin of right lower extremity       Past Surgical History:   Procedure Laterality Date    ANKLE SURGERY      HERNIA REPAIR  2020    Hernia Repair    OTHER SURGICAL HISTORY  10/19/2020    Biopsy Skin    ROTATOR CUFF REPAIR Bilateral 2020    Rotator Cuff Repair    TOTAL ABDOMINAL HYSTERECTOMY  2020    Total Abdominal Hysterectomy    UMBILICAL HERNIA REPAIR  2020    Umbilical Hernia Repair     Social History      Tobacco Use    Smoking status: Former     Current packs/day: 0.00     Average packs/day: 0.3 packs/day for 47.0 years (11.8 ttl pk-yrs)     Types: Cigarettes     Start date: 1963     Quit date: 2010     Years since quitting: 15.4    Smokeless tobacco: Never   Substance Use Topics    Alcohol use: Not Currently     Comment: rarely     Social History     Substance and Sexual Activity   Drug Use Never     Patient  has no history on file for sexual activity.    Family History[1]    Allergies   Allergen Reactions    Macrobid [Nitrofurantoin Monohyd/M-Cryst] Nausea/vomiting     Pt first discovered reaction to this medication on 9/10/24     Amoxicillin-Pot Clavulanate Itching     vomiting    Hydrocodone-Acetaminophen Rash and Itching     Current Outpatient Medications   Medication Sig Dispense Refill    predniSONE (Deltasone) 10 mg tablet Takes prn      acetaminophen (TylenoL) 325 mg tablet Take 2 tablets (650 mg) by mouth every 6 hours if needed for mild pain (1 - 3) or fever (temp greater than 38.0 C). For up to 14 days 90 tablet 0    albuterol (Proventil HFA) 90 mcg/actuation inhaler Inhale 2 puffs 4 times a day as needed.      albuterol 0.63 mg/3 mL nebulizer solution Take by nebulization every 6 hours if needed.      calcium carb,gluc/mag ox,gluc (CALCIUM MAGNESIUM ORAL) 1 tablet once daily.      chlorhexidine (Peridex) 0.12 % solution Use as directed. 473 mL 0    doxycycline (Adoxa) 100 mg tablet Take 1 tablet (100 mg) by mouth if needed.      ergocalciferol (Vitamin D-2) 1.25 MG (91212 UT) capsule Take 1 capsule (50,000 Units) by mouth 1 (one) time per week. 12 capsule 3    gabapentin (Neurontin) 100 mg capsule Take 1 capsule (100 mg) by mouth 3 times a day. 270 capsule 3    hydroCHLOROthiazide (HYDRODiuril) 25 mg tablet TAKE 1 TABLET BY MOUTH EVERY DAY 90 tablet 1    ipratropium-albuteroL (Duo-Neb) 0.5-2.5 mg/3 mL nebulizer solution Take 3 mL by nebulization every 6 hours if needed for wheezing or shortness of  breath. 180 mL     lidocaine (Xylocaine) 2 % solution Take by mouth if needed. Dental work      meloxicam (Mobic) 15 mg tablet Take 1 tablet (15 mg) by mouth once daily. (Patient taking differently: Take 1 tablet (15 mg) by mouth once daily as needed.)      metroNIDAZOLE (Metrocream) 0.75 % cream Apply to face twice daily (Patient taking differently: Apply topically 2 times a day as needed. Apply to face twice daily) 45 g 11    potassium chloride CR 20 mEq ER tablet TAKE 1 TABLET BY MOUTH EVERY DAY WITH FOOD 90 tablet 3    tiZANidine (Zanaflex) 4 mg tablet Take 1 tablet (4 mg) by mouth every 8 hours if needed for muscle spasms. 60 tablet 1    triamcinolone (Kenalog) 0.1 % cream Apply topically if needed.      zolpidem (Ambien) 10 mg tablet Take 1 tablet (10 mg) by mouth as needed at bedtime for sleep. (Patient taking differently: Take 1 tablet (10 mg) by mouth once daily at bedtime.) 90 tablet 1     No current facility-administered medications for this visit.     Review of Systems   Constitutional: Negative.    HENT: Negative.     Eyes: Negative.    Respiratory:  Positive for shortness of breath (with long distance walking).    Cardiovascular: Negative.    Gastrointestinal: Negative.    Endocrine: Negative.    Genitourinary: Negative.    Musculoskeletal:  Positive for arthralgias.        Reduced right shoulder ROM   Skin: Negative.    Neurological: Negative.    Hematological: Negative.    Psychiatric/Behavioral: Negative.       Physical Exam  Vitals reviewed.   Constitutional:       Appearance: She is obese.   HENT:      Head: Normocephalic and atraumatic.      Nose: Nose normal.      Mouth/Throat:      Mouth: Mucous membranes are moist.      Pharynx: Oropharynx is clear.   Eyes:      Extraocular Movements: Extraocular movements intact.      Conjunctiva/sclera: Conjunctivae normal.      Pupils: Pupils are equal, round, and reactive to light.   Cardiovascular:      Rate and Rhythm: Normal rate and regular rhythm.      " Pulses: Normal pulses.      Heart sounds: Normal heart sounds.   Pulmonary:      Effort: Pulmonary effort is normal. No respiratory distress.      Breath sounds: Normal breath sounds. No wheezing, rhonchi or rales.   Abdominal:      General: Bowel sounds are normal.      Palpations: Abdomen is soft.      Tenderness: There is no abdominal tenderness. There is no guarding or rebound.   Musculoskeletal:      Cervical back: Normal range of motion and neck supple.      Right lower leg: Edema (trace non-pitting edema) present.      Left lower leg: Edema (trace non-pitting edema) present.      Comments: Reduced right shoulder ROM. Pt is unable to raise her right arm above her head. Pt denies pain    Skin:     General: Skin is warm and dry.   Neurological:      General: No focal deficit present.      Mental Status: She is alert and oriented to person, place, and time. Mental status is at baseline.   Psychiatric:         Mood and Affect: Mood normal.         Behavior: Behavior normal.         Thought Content: Thought content normal.         Judgment: Judgment normal.          PAT AIRWAY:   Airway:     Mallampati::  II    TM distance::  >3 FB    Neck ROM::  Full   Bridge      Visit Vitals  /82   Pulse 110   Temp 36.6 °C (97.9 °F) (Temporal)   Resp 16   Ht 1.575 m (5' 2\")   Wt 89.4 kg (197 lb)   LMP 01/01/1977 (Approximate)   SpO2 96%   BMI 36.03 kg/m²   OB Status Hysterectomy   Smoking Status Former   BSA 1.98 m²     ASA: 3   CHADS: 4%  RCRI: 0.4%  Ariscat: 1.6%  DASI Risk Score      Flowsheet Row Pre-Admission Testing from 6/27/2025 in [facility] Pre-Admission Testing from 5/8/2025 in [facility]   Can you take care of yourself (eat, dress, bathe, or use toilet)?  2.75 filed at 06/27/2025 1012 2.75 filed at 05/08/2025 1549   Can you walk indoors, such as around your house? 1.75 filed at 06/27/2025 1012 1.75 filed at 05/08/2025 1549   Can you walk a block or two on level ground?  2.75 " filed at 06/27/2025 1012 2.75 filed at 05/08/2025 1549   Can you climb a flight of stairs or walk up a hill? 5.5 filed at 06/27/2025 1012 5.5 filed at 05/08/2025 1549   Can you run a short distance? 0 filed at 06/27/2025 1012 0 filed at 05/08/2025 1549   Can you do light work around the house like dusting or washing dishes? 2.7 filed at 06/27/2025 1012 2.7 filed at 05/08/2025 1549   Can you do moderate work around the house like vacuuming, sweeping floors or carrying groceries? 3.5 filed at 06/27/2025 1012 3.5 filed at 05/08/2025 1549   Can you do heavy work around the house like scrubbing floors or lifting and moving heavy furniture?  0 filed at 06/27/2025 1012 0 filed at 05/08/2025 1549   Can you do yard work like raking leaves, weeding or pushing a mower? 0 filed at 06/27/2025 1012 0 filed at 05/08/2025 1549   Can you have sexual relations? 5.25 filed at 06/27/2025 1012 5.25 filed at 05/08/2025 1549   Can you participate in moderate recreational activities like golf, bowling, dancing, doubles tennis or throwing a baseball or football? 0 filed at 06/27/2025 1012 0 filed at 05/08/2025 1549   Can you participate in strenous sports like swimming, singles tennis, football, basketball, or skiing? 0 filed at 06/27/2025 1012 0 filed at 05/08/2025 1549   DASI SCORE 24.2 filed at 06/27/2025 1012 24.2 filed at 05/08/2025 1549   METS Score (Will be calculated only when all the questions are answered) 5.7 filed at 06/27/2025 1012 5.7 filed at 05/08/2025 1549          Caprini DVT Assessment      Flowsheet Row Pre-Admission Testing from 5/8/2025 in [facility] ED to Hosp-Admission (Discharged) from 5/17/2024 in [facility] OR with Koby Bradley MD   DVT Score (IF A SCORE IS NOT CALCULATING, MUST SELECT A BMI TO COMPLETE) 8 filed at 05/08/2025 1548 6 filed at 05/17/2024 1749   Surgical Factors Major surgery planned, including arthroscopic and laproscopic (1-2 hours) filed at 05/08/2025  1548 --   BMI (BMI MUST BE CHOSEN) 31-40 (Obesity) filed at 05/08/2025 1548 31-40 (Obesity) filed at 05/17/2024 1749   RETIRED: Age -- Over 75 years filed at 05/17/2024 1749          Modified Frailty Index    No data to display       [id number] Stroke Risk Points  Current as of a minute ago        N/A 0 to 9 Points:      Last Change: N/A          The [id number] risk score (Lip LEIGHTON, et al. 2009. © 2010 [employer]) quantifies the risk of stroke for a patient with atrial fibrillation. For patients without atrial fibrillation or under the age of 18 this score appears as N/A. Higher score values generally indicate higher risk of stroke.        This score is not applicable to this patient. Components are not calculated.          Revised Cardiac Risk Index      Flowsheet Row Pre-Admission Testing from 6/27/2025 in [facility] Pre-Admission Testing from 5/8/2025 in [facility]   High-Risk Surgery (Intraperitoneal, Intrathoracic,Suprainguinal vascular) 0 filed at 06/27/2025 1126 0 filed at 05/08/2025 1549   History of ischemic heart disease (History of MI, History of positive exercuse test, Current chest paint considered due to myocardial ischemia, Use of nitrate therapy, ECG with pathological Q Waves) 0 filed at 06/27/2025 1126 0 filed at 05/08/2025 1549   History of congestive heart failure (pulmonary edemia, bilateral rales or S3 gallop, Paroxysmal nocturnal dyspnea, CXR showing pulmonary vascular redistribution) 0 filed at 06/27/2025 1126 0 filed at 05/08/2025 1549   History of cerebrovascular disease (Prior TIA or stroke) 0 filed at 06/27/2025 1126 0 filed at 05/08/2025 1549   Pre-operative insulin treatment 0 filed at 06/27/2025 1126 0 filed at 05/08/2025 1549   Pre-operative creatinine>2 mg/dl 0 filed at 06/27/2025 1126 0 filed at 05/08/2025 1549   Revised Cardiac Risk Calculator 0 filed at 06/27/2025 1126 0 filed at 05/08/2025 1549          Apfel Simplified  Score    No data to display       Risk Analysis Index Results This Encounter    No data found in the last 10 encounters.       Stop Bang Score      Flowsheet Row Pre-Admission Testing from 6/27/2025 in [facility] Pre-Admission Testing from 5/8/2025 in [facility]   Do you snore loudly? 1 filed at 06/27/2025 1013 1 filed at 05/08/2025 1439   Do you often feel tired or fatigued after your sleep? 1 filed at 06/27/2025 1013 1 filed at 05/08/2025 1439   Has anyone ever observed you stop breathing in your sleep? 0 filed at 06/27/2025 1013 0 filed at 05/08/2025 1439   Do you have or are you being treated for high blood pressure? 1 filed at 06/27/2025 1013 1 filed at 05/08/2025 1439   Recent BMI (Calculated) 36 filed at 06/27/2025 1013 36.3 filed at 05/08/2025 1439   Is BMI greater than 35 kg/m2? 1=Yes filed at 06/27/2025 1013 1=Yes filed at 05/08/2025 1439   Age older than 50 years old? 1=Yes filed at 06/27/2025 1013 1=Yes filed at 05/08/2025 1439   Is your neck circumference greater than 17 inches (Male) or 16 inches (Female)? 1 filed at 06/27/2025 1013 0 filed at 05/08/2025 1439   Gender - Male 0=No filed at 06/27/2025 1013 0=No filed at 05/08/2025 1439   STOP-BANG Total Score 6 filed at 06/27/2025 1013 5 filed at 05/08/2025 1439          Prodigy: High Risk  Total Score: 12              Prodigy Age Score           ARISCAT Score for Postoperative Pulmonary Complications      Flowsheet Row Pre-Admission Testing from 5/8/2025 in [facility]   Age Calculated Score 3 filed at 05/08/2025 1550   Preoperative SpO2 0 filed at 05/08/2025 1550   Respiratory infection in the last month Either upper or lower (i.e., URI, bronchitis, pneumonia), with fever and antibiotic treatment 0 filed at 05/08/2025 1550   Preoperative anemia (Hgb less than 10 g/dl) 0 filed at 05/08/2025 1550   Surgical incision  0 filed at 05/08/2025 1550   Duration of surgery  0 filed at 05/08/2025 1550   Emergency  Procedure  0 filed at 05/08/2025 1550   ARISCAT Total Score  3 filed at 05/08/2025 1550          Priya Perioperative Risk for Myocardial Infarction or Cardiac Arrest (CESARIO)      Flowsheet Row Pre-Admission Testing from 5/8/2025 in [facility]   Calculated Age Score 1.54 filed at 05/08/2025 1549   Functional Status  0 filed at 05/08/2025 1549   ASA Class  -1.92 filed at 05/08/2025 1549   Creatinine 0 filed at 05/08/2025 1549   Type of Procedure  0.80 filed at 05/08/2025 1549   CESARIO Total Score  -4.83 filed at 05/08/2025 1549   CESARIO % 0.79 filed at 05/08/2025 1549            Assessment and Plan:     Tear of right rotator cuff, unspecified tear extent, unspecified whether traumatic: ARTHROSCOPY, SHOULDER, WITH ROTATOR CUFF REPAIR right.  HTN: Pt is taking hydrochlorothiazide.   Asthma/chronic bronchitis: Pt uses her albuterol inhaler occasionally. Pt is followed by a pulmonologist.  Pt stated when she feels she is experiencing increased SOB and an exacerbation of her chronic bronchitis she takes oral prednisone, doxycycline, and uses her Duo-neb nebulizer. Pt stated her breathing is at her baseline.   Prediabetic: 4/26/2025 HgbA1C: 6.3  History of leukocytosis: 4/26/2025 WBC: 11.5. Pt reports she has had a mildly elevated WBC count for several years.   BMI: 36.03    BMP, CBC, and HgbA1C completed on 4/26/2025.  EKG was completed on 5/8/2025.    BRIGHT Munroe-CNP           [1]   Family History  Problem Relation Name Age of Onset    Stroke Mother      Diabetes Mother      Alzheimer's disease Father      Lung cancer Sister      No Known Problems Sister      Asthma Brother      Cancer Brother      No Known Problems Brother      Breast cancer Daughter 42 40    Cancer Daughter 42     No Known Problems Daughter

## 2025-06-29 LAB — STAPHYLOCOCCUS SPEC CULT: NORMAL

## 2025-07-02 ENCOUNTER — ANESTHESIA EVENT (OUTPATIENT)
Dept: OPERATING ROOM | Facility: HOSPITAL | Age: 80
End: 2025-07-02
Payer: COMMERCIAL

## 2025-07-02 SDOH — HEALTH STABILITY: MENTAL HEALTH: CURRENT SMOKER: 0

## 2025-07-03 ENCOUNTER — ANESTHESIA (OUTPATIENT)
Dept: OPERATING ROOM | Facility: HOSPITAL | Age: 80
End: 2025-07-03
Payer: COMMERCIAL

## 2025-07-03 ENCOUNTER — HOSPITAL ENCOUNTER (OUTPATIENT)
Facility: HOSPITAL | Age: 80
Setting detail: OUTPATIENT SURGERY
Discharge: HOME | End: 2025-07-03
Attending: STUDENT IN AN ORGANIZED HEALTH CARE EDUCATION/TRAINING PROGRAM | Admitting: STUDENT IN AN ORGANIZED HEALTH CARE EDUCATION/TRAINING PROGRAM
Payer: COMMERCIAL

## 2025-07-03 VITALS
WEIGHT: 198.41 LBS | HEART RATE: 91 BPM | TEMPERATURE: 97.3 F | SYSTOLIC BLOOD PRESSURE: 151 MMHG | RESPIRATION RATE: 15 BRPM | OXYGEN SATURATION: 92 % | HEIGHT: 62 IN | BODY MASS INDEX: 36.51 KG/M2 | DIASTOLIC BLOOD PRESSURE: 67 MMHG

## 2025-07-03 DIAGNOSIS — M75.101 TEAR OF RIGHT ROTATOR CUFF, UNSPECIFIED TEAR EXTENT, UNSPECIFIED WHETHER TRAUMATIC: Primary | ICD-10-CM

## 2025-07-03 PROCEDURE — 2780000003 HC OR 278 NO HCPCS: Performed by: STUDENT IN AN ORGANIZED HEALTH CARE EDUCATION/TRAINING PROGRAM

## 2025-07-03 PROCEDURE — 7100000002 HC RECOVERY ROOM TIME - EACH INCREMENTAL 1 MINUTE: Performed by: STUDENT IN AN ORGANIZED HEALTH CARE EDUCATION/TRAINING PROGRAM

## 2025-07-03 PROCEDURE — 29823 SHO ARTHRS SRG XTNSV DBRDMT: CPT | Performed by: STUDENT IN AN ORGANIZED HEALTH CARE EDUCATION/TRAINING PROGRAM

## 2025-07-03 PROCEDURE — 7100000010 HC PHASE TWO TIME - EACH INCREMENTAL 1 MINUTE: Performed by: STUDENT IN AN ORGANIZED HEALTH CARE EDUCATION/TRAINING PROGRAM

## 2025-07-03 PROCEDURE — 3600000004 HC OR TIME - INITIAL BASE CHARGE - PROCEDURE LEVEL FOUR: Performed by: STUDENT IN AN ORGANIZED HEALTH CARE EDUCATION/TRAINING PROGRAM

## 2025-07-03 PROCEDURE — 2500000004 HC RX 250 GENERAL PHARMACY W/ HCPCS (ALT 636 FOR OP/ED): Performed by: ANESTHESIOLOGY

## 2025-07-03 PROCEDURE — 2500000005 HC RX 250 GENERAL PHARMACY W/O HCPCS: Performed by: ANESTHESIOLOGY

## 2025-07-03 PROCEDURE — 2500000004 HC RX 250 GENERAL PHARMACY W/ HCPCS (ALT 636 FOR OP/ED): Performed by: STUDENT IN AN ORGANIZED HEALTH CARE EDUCATION/TRAINING PROGRAM

## 2025-07-03 PROCEDURE — 7100000001 HC RECOVERY ROOM TIME - INITIAL BASE CHARGE: Performed by: STUDENT IN AN ORGANIZED HEALTH CARE EDUCATION/TRAINING PROGRAM

## 2025-07-03 PROCEDURE — C1713 ANCHOR/SCREW BN/BN,TIS/BN: HCPCS | Performed by: STUDENT IN AN ORGANIZED HEALTH CARE EDUCATION/TRAINING PROGRAM

## 2025-07-03 PROCEDURE — 3600000009 HC OR TIME - EACH INCREMENTAL 1 MINUTE - PROCEDURE LEVEL FOUR: Performed by: STUDENT IN AN ORGANIZED HEALTH CARE EDUCATION/TRAINING PROGRAM

## 2025-07-03 PROCEDURE — 2500000002 HC RX 250 W HCPCS SELF ADMINISTERED DRUGS (ALT 637 FOR MEDICARE OP, ALT 636 FOR OP/ED): Performed by: ANESTHESIOLOGY

## 2025-07-03 PROCEDURE — 2500000004 HC RX 250 GENERAL PHARMACY W/ HCPCS (ALT 636 FOR OP/ED): Performed by: ANESTHESIOLOGIST ASSISTANT

## 2025-07-03 PROCEDURE — 2720000007 HC OR 272 NO HCPCS: Performed by: STUDENT IN AN ORGANIZED HEALTH CARE EDUCATION/TRAINING PROGRAM

## 2025-07-03 PROCEDURE — 29827 SHO ARTHRS SRG RT8TR CUF RPR: CPT | Performed by: STUDENT IN AN ORGANIZED HEALTH CARE EDUCATION/TRAINING PROGRAM

## 2025-07-03 PROCEDURE — A4649 SURGICAL SUPPLIES: HCPCS | Performed by: STUDENT IN AN ORGANIZED HEALTH CARE EDUCATION/TRAINING PROGRAM

## 2025-07-03 PROCEDURE — 3700000002 HC GENERAL ANESTHESIA TIME - EACH INCREMENTAL 1 MINUTE: Performed by: STUDENT IN AN ORGANIZED HEALTH CARE EDUCATION/TRAINING PROGRAM

## 2025-07-03 PROCEDURE — 7100000009 HC PHASE TWO TIME - INITIAL BASE CHARGE: Performed by: STUDENT IN AN ORGANIZED HEALTH CARE EDUCATION/TRAINING PROGRAM

## 2025-07-03 PROCEDURE — 3700000001 HC GENERAL ANESTHESIA TIME - INITIAL BASE CHARGE: Performed by: STUDENT IN AN ORGANIZED HEALTH CARE EDUCATION/TRAINING PROGRAM

## 2025-07-03 DEVICE — ANCHOR, BIOCOMPOSITE CORKSCREW FT, 5.5 X 14.7MM, W/TWO 1.3 SUTURE TAPE: Type: IMPLANTABLE DEVICE | Site: SHOULDER | Status: FUNCTIONAL

## 2025-07-03 RX ORDER — FENTANYL CITRATE 50 UG/ML
50 INJECTION, SOLUTION INTRAMUSCULAR; INTRAVENOUS ONCE
Status: COMPLETED | OUTPATIENT
Start: 2025-07-03 | End: 2025-07-03

## 2025-07-03 RX ORDER — ONDANSETRON HYDROCHLORIDE 2 MG/ML
4 INJECTION, SOLUTION INTRAVENOUS ONCE AS NEEDED
Status: DISCONTINUED | OUTPATIENT
Start: 2025-07-03 | End: 2025-07-03 | Stop reason: HOSPADM

## 2025-07-03 RX ORDER — CEFAZOLIN SODIUM 2 G/100ML
2 INJECTION, SOLUTION INTRAVENOUS ONCE
Status: COMPLETED | OUTPATIENT
Start: 2025-07-03 | End: 2025-07-03

## 2025-07-03 RX ORDER — BUPIVACAINE HYDROCHLORIDE 2.5 MG/ML
INJECTION, SOLUTION INFILTRATION; PERINEURAL AS NEEDED
Status: DISCONTINUED | OUTPATIENT
Start: 2025-07-03 | End: 2025-07-03

## 2025-07-03 RX ORDER — FENTANYL CITRATE 50 UG/ML
50 INJECTION, SOLUTION INTRAMUSCULAR; INTRAVENOUS EVERY 5 MIN PRN
Status: DISCONTINUED | OUTPATIENT
Start: 2025-07-03 | End: 2025-07-03 | Stop reason: HOSPADM

## 2025-07-03 RX ORDER — ALBUTEROL SULFATE 0.83 MG/ML
2.5 SOLUTION RESPIRATORY (INHALATION) ONCE AS NEEDED
Status: COMPLETED | OUTPATIENT
Start: 2025-07-03 | End: 2025-07-03

## 2025-07-03 RX ORDER — ASPIRIN 81 MG/1
81 TABLET ORAL DAILY
Qty: 14 TABLET | Refills: 0 | Status: SHIPPED | OUTPATIENT
Start: 2025-07-03 | End: 2025-07-17

## 2025-07-03 RX ORDER — LIDOCAINE HYDROCHLORIDE 10 MG/ML
INJECTION, SOLUTION EPIDURAL; INFILTRATION; INTRACAUDAL; PERINEURAL AS NEEDED
Status: DISCONTINUED | OUTPATIENT
Start: 2025-07-03 | End: 2025-07-03

## 2025-07-03 RX ORDER — ONDANSETRON 4 MG/1
4 TABLET, FILM COATED ORAL EVERY 8 HOURS PRN
Qty: 30 TABLET | Refills: 2 | Status: SHIPPED | OUTPATIENT
Start: 2025-07-03 | End: 2025-08-02

## 2025-07-03 RX ORDER — SODIUM CHLORIDE, SODIUM LACTATE, POTASSIUM CHLORIDE, CALCIUM CHLORIDE 600; 310; 30; 20 MG/100ML; MG/100ML; MG/100ML; MG/100ML
INJECTION, SOLUTION INTRAVENOUS CONTINUOUS PRN
Status: DISCONTINUED | OUTPATIENT
Start: 2025-07-03 | End: 2025-07-03

## 2025-07-03 RX ORDER — MIDAZOLAM HYDROCHLORIDE 1 MG/ML
2 INJECTION, SOLUTION INTRAMUSCULAR; INTRAVENOUS ONCE
Status: COMPLETED | OUTPATIENT
Start: 2025-07-03 | End: 2025-07-03

## 2025-07-03 RX ORDER — LABETALOL HYDROCHLORIDE 5 MG/ML
INJECTION, SOLUTION INTRAVENOUS AS NEEDED
Status: DISCONTINUED | OUTPATIENT
Start: 2025-07-03 | End: 2025-07-03

## 2025-07-03 RX ORDER — DOCUSATE SODIUM 100 MG/1
100 CAPSULE, LIQUID FILLED ORAL 2 TIMES DAILY
Qty: 30 CAPSULE | Refills: 1 | Status: SHIPPED | OUTPATIENT
Start: 2025-07-03 | End: 2025-08-02

## 2025-07-03 RX ORDER — PROPOFOL 10 MG/ML
INJECTION, EMULSION INTRAVENOUS AS NEEDED
Status: DISCONTINUED | OUTPATIENT
Start: 2025-07-03 | End: 2025-07-03

## 2025-07-03 RX ORDER — HYDRALAZINE HYDROCHLORIDE 20 MG/ML
5 INJECTION INTRAMUSCULAR; INTRAVENOUS EVERY 30 MIN PRN
Status: DISCONTINUED | OUTPATIENT
Start: 2025-07-03 | End: 2025-07-03 | Stop reason: HOSPADM

## 2025-07-03 RX ORDER — MEPERIDINE HYDROCHLORIDE 25 MG/ML
12.5 INJECTION INTRAMUSCULAR; INTRAVENOUS; SUBCUTANEOUS EVERY 10 MIN PRN
Status: DISCONTINUED | OUTPATIENT
Start: 2025-07-03 | End: 2025-07-03 | Stop reason: HOSPADM

## 2025-07-03 RX ORDER — LABETALOL HYDROCHLORIDE 5 MG/ML
5 INJECTION, SOLUTION INTRAVENOUS ONCE AS NEEDED
Status: DISCONTINUED | OUTPATIENT
Start: 2025-07-03 | End: 2025-07-03 | Stop reason: HOSPADM

## 2025-07-03 RX ORDER — SODIUM CHLORIDE, SODIUM LACTATE, POTASSIUM CHLORIDE, CALCIUM CHLORIDE 600; 310; 30; 20 MG/100ML; MG/100ML; MG/100ML; MG/100ML
100 INJECTION, SOLUTION INTRAVENOUS CONTINUOUS
Status: DISCONTINUED | OUTPATIENT
Start: 2025-07-03 | End: 2025-07-03 | Stop reason: HOSPADM

## 2025-07-03 RX ORDER — BUPIVACAINE HCL/EPINEPHRINE 0.5-1:200K
VIAL (ML) INJECTION
Status: DISCONTINUED | OUTPATIENT
Start: 2025-07-03 | End: 2025-07-03

## 2025-07-03 RX ORDER — ROCURONIUM BROMIDE 10 MG/ML
INJECTION, SOLUTION INTRAVENOUS AS NEEDED
Status: DISCONTINUED | OUTPATIENT
Start: 2025-07-03 | End: 2025-07-03

## 2025-07-03 RX ORDER — ALBUTEROL SULFATE 0.83 MG/ML
2.5 SOLUTION RESPIRATORY (INHALATION) ONCE
Status: COMPLETED | OUTPATIENT
Start: 2025-07-03 | End: 2025-07-03

## 2025-07-03 RX ORDER — ACETAMINOPHEN 500 MG
1000 TABLET ORAL EVERY 6 HOURS PRN
Qty: 30 TABLET | Refills: 2 | Status: SHIPPED | OUTPATIENT
Start: 2025-07-03 | End: 2025-07-14

## 2025-07-03 RX ORDER — ONDANSETRON HYDROCHLORIDE 2 MG/ML
INJECTION, SOLUTION INTRAVENOUS AS NEEDED
Status: DISCONTINUED | OUTPATIENT
Start: 2025-07-03 | End: 2025-07-03

## 2025-07-03 RX ORDER — PHENYLEPHRINE HCL IN 0.9% NACL 1 MG/10 ML
SYRINGE (ML) INTRAVENOUS AS NEEDED
Status: DISCONTINUED | OUTPATIENT
Start: 2025-07-03 | End: 2025-07-03

## 2025-07-03 RX ORDER — OXYCODONE HYDROCHLORIDE 5 MG/1
5 TABLET ORAL EVERY 6 HOURS PRN
Qty: 28 TABLET | Refills: 0 | Status: SHIPPED | OUTPATIENT
Start: 2025-07-03 | End: 2025-07-10

## 2025-07-03 RX ADMIN — PROPOFOL 140 MG: 10 INJECTION, EMULSION INTRAVENOUS at 07:17

## 2025-07-03 RX ADMIN — ROCURONIUM BROMIDE 50 MG: 10 INJECTION, SOLUTION INTRAVENOUS at 07:17

## 2025-07-03 RX ADMIN — MIDAZOLAM 2 MG: 1 INJECTION INTRAMUSCULAR; INTRAVENOUS at 06:58

## 2025-07-03 RX ADMIN — BUPIVACAINE HYDROCHLORIDE 10 ML: 2.5 INJECTION, SOLUTION INFILTRATION; PERINEURAL at 07:03

## 2025-07-03 RX ADMIN — BUPIVACAINE HYDROCHLORIDE AND EPINEPHRINE BITARTRATE 20 ML: 5; .005 INJECTION, SOLUTION PERINEURAL at 07:01

## 2025-07-03 RX ADMIN — LABETALOL HYDROCHLORIDE 5 MG: 5 INJECTION INTRAVENOUS at 07:45

## 2025-07-03 RX ADMIN — SODIUM CHLORIDE, POTASSIUM CHLORIDE, SODIUM LACTATE AND CALCIUM CHLORIDE: 600; 310; 30; 20 INJECTION, SOLUTION INTRAVENOUS at 07:10

## 2025-07-03 RX ADMIN — ONDANSETRON 4 MG: 2 INJECTION, SOLUTION INTRAMUSCULAR; INTRAVENOUS at 07:19

## 2025-07-03 RX ADMIN — ALBUTEROL SULFATE 2.5 MG: 2.5 SOLUTION RESPIRATORY (INHALATION) at 06:54

## 2025-07-03 RX ADMIN — PROPOFOL 40 MG: 10 INJECTION, EMULSION INTRAVENOUS at 07:38

## 2025-07-03 RX ADMIN — SUGAMMADEX 200 MG: 100 INJECTION, SOLUTION INTRAVENOUS at 08:20

## 2025-07-03 RX ADMIN — Medication 100 MCG: at 07:26

## 2025-07-03 RX ADMIN — DEXAMETHASONE SODIUM PHOSPHATE 8 MG: 4 INJECTION INTRA-ARTICULAR; INTRALESIONAL; INTRAMUSCULAR; INTRAVENOUS; SOFT TISSUE at 07:19

## 2025-07-03 RX ADMIN — Medication 200 MCG: at 07:21

## 2025-07-03 RX ADMIN — PROPOFOL 20 MG: 10 INJECTION, EMULSION INTRAVENOUS at 07:33

## 2025-07-03 RX ADMIN — LIDOCAINE HYDROCHLORIDE 5 ML: 10 INJECTION, SOLUTION EPIDURAL; INFILTRATION; INTRACAUDAL; PERINEURAL at 07:17

## 2025-07-03 RX ADMIN — CEFAZOLIN SODIUM 2 G: 2 INJECTION, SOLUTION INTRAVENOUS at 07:19

## 2025-07-03 RX ADMIN — FENTANYL CITRATE 50 MCG: 50 INJECTION INTRAMUSCULAR; INTRAVENOUS at 06:57

## 2025-07-03 RX ADMIN — ALBUTEROL SULFATE 2.5 MG: 2.5 SOLUTION RESPIRATORY (INHALATION) at 08:55

## 2025-07-03 ASSESSMENT — PAIN SCALES - GENERAL
PAINLEVEL_OUTOF10: 0 - NO PAIN
PAIN_LEVEL: 0

## 2025-07-03 ASSESSMENT — PAIN - FUNCTIONAL ASSESSMENT
PAIN_FUNCTIONAL_ASSESSMENT: 0-10

## 2025-07-03 NOTE — ANESTHESIA PREPROCEDURE EVALUATION
Patient: Jaclyn Carson    Procedure Information       Date/Time: 07/03/25 0700    Procedure: ARTHROSCOPY, SHOULDER, WITH ROTATOR CUFF REPAIR ( preop block, beach chair, arthrex anchors ) (Right: Shoulder)    Location: [facility] / [facility]    Surgeons: Jorge Silva MD            Relevant Problems   Cardiac   (+) HLD (hyperlipidemia)   (+) HTN (hypertension)      Neuro   (+) Anxiety   (+) Depression      GI   (+) Esophageal varices   (+) GERD (gastroesophageal reflux disease)      Liver   (+) SANON (nonalcoholic steatohepatitis)      Endocrine   (+) Obesity      Hematology   (+) Anemia      Musculoskeletal   (+) OA (osteoarthritis)      ID   (+) Toenail fungus      Skin   (+) Squamous cell carcinoma of skin       Clinical information reviewed:                   NPO Detail:  No data recorded     Physical Exam    Airway  Mallampati: II  TM distance: >3 FB  Neck ROM: full  Mouth opening: 3 or more finger widths     Cardiovascular Comments: deferred   Dental    Pulmonary Comments: deferred   Abdominal   Comments: deferred           Anesthesia Plan    History of general anesthesia?: yes  History of complications of general anesthesia?: no    ASA 3     general, MAC and regional   (Brachial plexus block, ICBB and GETA)  The patient is not a current smoker.  Education provided regarding risk of obstructive sleep apnea.  intravenous induction   Postoperative pain plan includes opioids.  Anesthetic plan and risks discussed with patient.    Plan discussed with CAA.

## 2025-07-03 NOTE — OP NOTE
ARTHROSCOPY, SHOULDER, WITH ROTATOR CUFF REPAIR ( preop block, beach chair, arthrex anchors ) (R) Operative Note     Date: 7/3/2025  OR Location: [facility]    Name: Jaclyn Carson, : 1945, Age: 79 y.o., MRN: [id number], Sex: female    Diagnosis  Pre-op Diagnosis      * Tear of right rotator cuff, unspecified tear extent, unspecified whether traumatic [M75.101] Post-op Diagnosis     * Tear of right rotator cuff, unspecified tear extent, unspecified whether traumatic [M75.101]     Procedures  ARTHROSCOPY, SHOULDER, WITH ROTATOR CUFF REPAIR ( preop block, beach chair, arthrex anchors )  85522 - IA SURGICAL ARTHROSCOPY SHOULDER W/ROTATOR CUFF RPR    IA SURGICAL ARTHROSCOPY SHOULDER XTNSV DBRDMT 3+ [77672]  Surgeons      * Jorge Silva - Primary    Resident/Fellow/Other Assistant:  Surgeons and Role:  * No surgeons found with a matching role *    Staff:   Circulator: Lanre  Circulator: Zoila Ceron Person: Gill Ceron Person: Tabitha    Anesthesia Staff: Anesthesiologist: Aj Villar MD  C-AA: SHIVANI Mi    Procedure Summary  Anesthesia: Regional, General, Monitor Anesthesia Care  ASA: III  Estimated Blood Loss: 5 mL  Intra-op Medications:   Administrations occurring from 0700 to 0905 on 25:   Medication Name Total Dose   dexAMETHasone (Decadron) 4 mg/mL IV Syringe 2 mL 8 mg   labetalol (Normodyne,Trandate) injection 5 mg   LR infusion Cannot be calculated   lidocaine PF (Xylocaine-MPF) local injection 1 % 5 mL   ondansetron (Zofran) 2 mg/mL injection 4 mg   phenylephrine 100 mcg/mL syringe 10 mL (prefilled) 300 mcg   propofol (Diprivan) injection 10 mg/mL 200 mg   rocuronium (ZeMuron) 50 mg/5 mL injection 50 mg   ceFAZolin (Ancef) 2 g in dextrose (iso)  mL 2 g              Anesthesia Record               Intraprocedure I/O Totals          Intake    ceFAZolin (Ancef) 2 g in dextrose (iso)  mL 100.00 mL    Total Intake 100 mL          Specimen: No specimens collected               Drains and/or Catheters: * None in log *    Tourniquet Times:         Implants:  Implants       Type Name Action Serial No.      Screw ANCHOR, BIOCOMPOSITE CORKSCREW FT, 5.5 X 14.7MM, W/TWO 1.3 SUTURE TAPE - [id number] Implanted      Screw ANCHOR, BIOCOMPOSITE CORKSCREW FT, 5.5 X 14.7MM, W/TWO 1.3 SUTURE TAPE - [id number] Implanted                   INDICATIONS FOR PROCEDURE: The patient had significant pain and weakness in the affected extremity which was refractory to nonoperative measures and was diagnosed with rotator cuff tear of the right shoulder. Treatment options were discussed. The patient was proposed to have a shoulder arthroscopy and related procedures based on preoperative planning and intraoperative findings. Risks and benefits of surgery and alternatives of treatment were discussed.  The patient understood all the risks and benefits and wanted to proceed with surgical option.    Patient had a previous rotator cuff repair, as well as ORIF of the proximal humerus.  MRI did show a massive rotator cuff tear with significant retraction.  Importantly, there was grade 4 fatty atrophy of the infraspinatus and fatty atrophy of the supraspinatus.  I did talk to the patient in the office that likely the most reliable option would have been a reverse shoulder replacement, but she was not interested in this.  She wanted to see if we could repair the tendon to see how she did.  I did tell her that this has about a 50% chance or less of working.  She still wished to proceed.    DESCRIPTION OF PROCEDURE: Patient was met in the preoperative holding area. Consent for surgery was reviewed and the correct operative extremity was verified and marked. The patient then underwent a preoperative  nerve block, please see anesthesia records regarding this. The patient was then brought to the operating room and was placed in a supine position on the operating table.  SCDs were placed for DVT prophylaxis. General  anesthesia was induced. The patient was placed in a beach chair position to around 90 degrees of inclination, and all the bony prominences were well padded. The operative upper extremity was prepped and draped in usual sterile fashion.  A time out was held confirming the correct patient, operative side, operative procedure, preoperative antibiotics, implants being present and that it was safe to proceed--all were in agreement it was safe to proceed.    Exam under anesthesia: Prior to incision, an EUA showed: She did have some stiffness in forward flexion which we did a gentle manipulation on to get forward flexion 160    Diagnostic shoulder arthroscopy of the glenohumeral joint: A standard posterolateral arthroscopic portal was made approximately 2 cm medial and inferior to the posterolateral border of the acromion, at the soft spot of the glenohumeral joint. The arthroscope was introduced into the glenohumeral joint. Under arthroscopic visualization, an anterior portal was made in the rotator interval. This was made by first visualizing the trajectory with a spinal needle and then utilizing a cannula from outside to inside to create this anterior portal along a similar path in the same trajectory through a small skin incision.    Diagnostic arthroscopy revealed:   Biceps tendon: Intact with some tenosynovitis  Subscapularis: Intact  Anterior capsule: Synovitis  Glenoid cartilage: Cartilage delamination  Humeral head cartilage: Significant cartilage delamination  Anterior labrum: Degenerative tearing  Superior labrum: Degenerative tearing  Posterior labrum: Degenerative tearing  Axillary recess: No loose bodies  Inferior glenohumeral ligaments: Intact  Posterosuperior rotator cuff: Massive tear involving the supraspinatus and infraspinatus    A debridement was then performed on anterior capsule, anterior labrum, superior labrum biceps complex, posterior labrum, glenoid cartilage, humeral head cartilage.  Of note,  there was areas of the humeral cartilage that a complete loss of cartilage    Subacromial bursectomy: Next, the arthroscope was introduced into the subacromial space. There was extensive synovitis and bursitis in this region with associated scarring. An extensive bursectomy and synovectomy was performed in the subacromial space using a combination of 4.5 mm shaver and the RF device through a lateral portal.     Rotator cuff repair: Attention was then turned to repair of the rotator cuff tears. There was a massive tear of the supraspinatus and infraspinatus tendons. In order to fully mobilize the tendons, soft tissue releases were performed anteriorly, superiorly, and inferiorly, essentially circumferentially around the tendons.  Even with significant mobilization, we were only able to get the posterior portion of the tear mobilized to the footprint.  The remainder the tear was significantly contracted and poor tissue quality had no way of reaching the footprint.  Next, a medial row double loaded Arthrex corkscrew anchors with SutureTape were placed, at the articular margin. Utilizing the Scorpion suture passer and SutureTape grasper, suture strands were passed through the rotator cuff tissue from anterior to posterior in positions to reduce the torn cuff in a horizontal mattress configuration. Suture pairs were then tied using an arthroscopic knot pusher.  This completed the partial rotator cuff repair of the posterior aspect of the tear.    Closure of incisions:  After completion of a satisfactory debridement, the shoulder subacromial space was once again swept to remove any remaining loose bone fragments, synovitis, and potential scar tissue. Arthroscopic fluid was evacuated from the subacromial space joint and instruments were removed.    The portals were reapproximated and closed with 3-0 nylon suture. We confirmed that all counts were correct at the end of the case prior to and following closure. A clean  sterile dressing consisting of xeroform, fluffs, ABD pad, and foam tape was applied. The drapes were then taken down. The patient was then placed into a sling with an abduction pillow prior to being awoken from anesthesia.    Following the procedure, the patient was satisfactorily awakened from anesthesia and transferred to the postanesthesia care unit in stable condition.     ATTESTATION: Dr. Silva was present for the entirety of the procedure and personally performed all aspects of this procedure.    Dragon software was used to dictate this note, please be aware that minor errors in transcription may be present.    Jorge Silva MD    Shoulder/Elbow Surgery  [facility]      Evidence of Infection: No   Complications:  None; patient tolerated the procedure well.    Disposition: PACU - hemodynamically stable.  Condition: stable     Attending Attestation: I was present and scrubbed for the entire procedure.    Jorge Silva  Phone Number: [phone]

## 2025-07-03 NOTE — ANESTHESIA PROCEDURE NOTES
Peripheral Block    Patient location during procedure: pre-op  Medication administered at: 7/3/2025 7:03 AM  End time: 7/3/2025 7:04 AM  Reason for block: at surgeon's request and post-op pain management  Staffing  Performed: attending   Authorized by: Aj Villar MD    Performed by: Aj Villar MD  Preanesthetic Checklist  Completed: patient identified, IV checked, site marked, risks and benefits discussed, surgical consent, monitors and equipment checked, pre-op evaluation and timeout performed   Timeout performed at: 7/3/2025 6:57 AM  Peripheral Block  Patient position: laying flat  Prep: alcohol swabs  Patient monitoring: heart rate, cardiac monitor and continuous pulse ox  Block type: other (intercostobrachial)  Laterality: right  Injection technique: single-shot  Needle  Needle type: short-bevel   Needle gauge: 22 G  Needle length: 5 cm  Needle localization: ultrasound guidance  Assessment  Injection assessment: negative aspiration for heme, no paresthesia on injection and incremental injection  Paresthesia pain: none  Heart rate change: no  Slow fractionated injection: yes  Additional Notes  This was an intercostobrachial plexus block

## 2025-07-03 NOTE — NURSING NOTE
1000: patient and daughter given discharge instructions. Both state understanding and have had all questions answered. VSS. PIV dc'd. Patient used bathroom on way to car.

## 2025-07-03 NOTE — DISCHARGE INSTRUCTIONS
Shoulder and Elbow Service  Jorge Silva MD    Discharge Instructions after Arthroscopic Shoulder Repair    Surgical Dressing: You have a bulky dressing over the shoulder. This needs to stay dry for 3 days. You may remove your dressing after three days and leave open to air. There will be sutures in place. Do not use any aerosol deodorants or lotions on or near surgical incision(s). You may shower 4 days after surgery. The incision(s) CANNOT get wet prior to 4 days. Simply allow the water to wash over the site and then pat dry. Do not rub the incision(s).    Activity: Remain in your sling at all times. This includes sleeping in your sling. You should come out of the sling a 4-5x times a day to work on elbow range of motion. OK to use wrist and fingers for light activities. Do not actively move your shoulder during this time. Active reaching and lifting away from the body are not permitted. You may use the operative arm for activities of daily living that do not require the operative arm to leave the side of the body. Such as: eating, drinking and bathing. Remain non-weight bearing with the operative arm until you are seen post operatively.     Pain: Pain medication has been prescribed for you. You will likely need the strong, narcotic pain medicine (usually oxycodone) for the first 72 hours. If pain is severe in the first few days, it is OK to take the oxycodone as 2 tablets every 4 hours as needed. Otherwise, take 1 tablet every 4-6 hours. You should take the extra-strength tylenol with the oxycodone, and after a few days you should be only taking the tylenol. Use cryotherapy machine or ice on the shoulder for the first 2 weeks following surgery.    Other medications: OK to resume all other home medications the day after surgery, unless you were told otherwise. Avoid taking anti-inflammatory pain medications (Advil, Motrin, Ibuprofen, Aleve, Naproxen or Naprosyn) for 2 weeks after surgery. You will also be  given anti-nausea medicine (Zofran) and anti-constipation medicine (docusate) and can use these as needed.     Blood clot prevention: Aspirin has been prescribed to prevent blood clots. Take one aspirin (81 mg) tablet once per day for 2 weeks after surgery, unless you have an aspirin sensitivity or allergy, asthma or are on blood thinners. If Coumadin, Plavix, Xarelto or other blood thinning medication is prescribed for blood clot prevention, take this medication as directed by your medical clearance physician.     Sleeping: After shoulder surgery, it is often uncomfortable to sleep on your back or side. Recommended sleeping positions are in a recliner or in bed with a ramp pillow or multiple pillows under your back.     Swelling: Swelling around the shoulder that travels to the elbow/hand/fingers is normal after shoulder surgery. Doing your elbow/wrist/finger exercises will help reduce this.     Please call the office at [phone] for any problems. Including the following:  - Excessive redness of the incision  - ANY drainage at or around incision  - Fever of more than 101.5 F    A postoperative follow up appointment will be made for you. Please call [phone] with questions. You should see Dr Silva 10-14 days after your surgical procedure.          no

## 2025-07-03 NOTE — ANESTHESIA PROCEDURE NOTES
Airway  Date/Time: 7/3/2025 7:18 AM  Reason: elective    Airway not difficult    Staffing  Performed: SHIVANI   Authorized by: Aj Villar MD    Performed by: SHIVANI Mi  Patient location during procedure: OR    Patient Condition  Indications for airway management: anesthesia and airway protection  Patient position: sniffing  Planned trial extubation  Sedation level: deep     Final Airway Details   Preoxygenated: yes  Final airway type: endotracheal airway  Successful airway: ETT  Cuffed: yes   Successful intubation technique: direct laryngoscopy  Adjuncts used in placement: intubating stylet  Endotracheal tube insertion site: oral  Blade: Teodora  Blade size: #3  ETT size (mm): 7.0  Cormack-Lehane Classification: grade IIa - partial view of glottis  Placement verified by: chest auscultation, capnometry and palpation of cuff   Measured from: lips  ETT to lips (cm): 21  Number of attempts at approach: 1

## 2025-07-03 NOTE — ANESTHESIA PROCEDURE NOTES
Peripheral Block    Patient location during procedure: pre-op  Medication administered at: 7/3/2025 7:01 AM  End time: 7/3/2025 7:03 AM  Reason for block: at surgeon's request and post-op pain management  Staffing  Performed: attending   Authorized by: Aj Villar MD    Performed by: Aj Villar MD  Preanesthetic Checklist  Completed: patient identified, IV checked, site marked, risks and benefits discussed, surgical consent, monitors and equipment checked, pre-op evaluation and timeout performed   Timeout performed at: 7/3/2025 6:57 AM  Peripheral Block  Patient position: laying flat  Prep: alcohol swabs  Patient monitoring: heart rate, cardiac monitor and continuous pulse ox  Block type: interscalene  Laterality: right  Injection technique: single-shot  Guidance: nerve stimulator and ultrasound guided  Needle  Needle type: short-bevel   Needle gauge: 22 G  Needle length: 5 cm  Needle localization: anatomical landmarks, ultrasound guidance and nerve stimulator  Needle insertion depth: 4 cm  Assessment  Injection assessment: negative aspiration for heme, no paresthesia on injection, incremental injection and local visualized surrounding nerve on ultrasound  Paresthesia pain: none  Heart rate change: no  Slow fractionated injection: yes  Medications Administered  dexAMETHasone (Decadron) injection - perineural injection   10 mg - 7/3/2025 7:01:00 AM  bupivacaine-EPINEPHrine (MARCAINE w/EPI) 0.5% -1:[id number] Perineural - perineural injection   20 mL - 7/3/2025 7:01:00 AM

## 2025-07-03 NOTE — ANESTHESIA POSTPROCEDURE EVALUATION
Patient: Jaclyn Carson    Procedure Summary       Date: 07/03/25 Room / Location: [facility] 04 / [facility]    Anesthesia Start: 0710 Anesthesia Stop: 0836    Procedure: ARTHROSCOPY, SHOULDER, WITH ROTATOR CUFF REPAIR ( preop block, beach chair, arthrex anchors ) (Right: Shoulder) Diagnosis:       Tear of right rotator cuff, unspecified tear extent, unspecified whether traumatic      (Tear of right rotator cuff, unspecified tear extent, unspecified whether traumatic [M75.101])    Surgeons: Jorge Silva MD Responsible Provider: Aj Villar MD    Anesthesia Type: general, MAC, regional ASA Status: 3            Anesthesia Type: general, MAC, regional    Vitals Value Taken Time   /68 07/03/25 09:15   Temp 36.7 °C (98.1 °F) 07/03/25 09:00   Pulse 89 07/03/25 09:15   Resp 19 07/03/25 09:15   SpO2 93 % 07/03/25 09:00       Anesthesia Post Evaluation    Patient location during evaluation: PACU  Patient participation: complete - patient participated  Level of consciousness: awake and alert  Pain score: 0  Pain management: adequate  Multimodal analgesia pain management approach  Airway patency: patent  Two or more strategies used to mitigate risk of obstructive sleep apnea  Cardiovascular status: acceptable and blood pressure returned to baseline  Respiratory status: acceptable  Hydration status: acceptable  Postoperative Nausea and Vomiting: none  Comments: Block appears to be working well with no apparent post-anesthetic complication noted.           There were no known notable events for this encounter.

## 2025-07-16 ENCOUNTER — APPOINTMENT (OUTPATIENT)
Dept: ORTHOPEDIC SURGERY | Facility: CLINIC | Age: 80
End: 2025-07-16
Payer: COMMERCIAL

## 2025-07-16 DIAGNOSIS — M75.101 TEAR OF RIGHT ROTATOR CUFF, UNSPECIFIED TEAR EXTENT, UNSPECIFIED WHETHER TRAUMATIC: Primary | ICD-10-CM

## 2025-07-16 PROCEDURE — 99024 POSTOP FOLLOW-UP VISIT: CPT | Performed by: STUDENT IN AN ORGANIZED HEALTH CARE EDUCATION/TRAINING PROGRAM

## 2025-07-16 NOTE — PROGRESS NOTES
History: Patient returns to the office status post R partial RCR on 2 weeks ago. Patient has been immobilized in a sling and pain is well controlled. Denies numbness/tingling.     Past medical history, past surgical history, medications, allergies, family history, social history, and review of systems were reviewed today and have been documented separately in this encounter.   A 12 point review of systems was negative other than as stated in the HPI.    Physical Examination:    On exam of the right upper extremity, incisions are well healed, without significant erythema or drainage, sutures were removed today. Demonstrates full ROM of the elbow/wrist/hand. Neurovascularly intact throughout.    Assessment: 2 weeks s/p R partial RCR     Plan: At this point we will start PT. Follow up in 4 weeks. All questions answered.     Dragon software was used to dictate this note, please be aware that minor errors in transcription may be present.    Jorge Silva MD    Shoulder/Elbow Surgery  [facility]

## 2025-07-16 NOTE — LETTER
July 16, 2025     Patient: Jaclyn Carson   YOB: 1945   Date of Visit: 7/16/2025       To Whom It May Concern:    It is my medical opinion that Jaclyn Carson may return to work as tolerated on 7/21/25 with the following restrictions:  -work from home  -seated work    If you have any questions or concerns, please don't hesitate to call [phone].      Sincerely,    Jorge Silva MD

## 2025-07-18 ENCOUNTER — APPOINTMENT (OUTPATIENT)
Dept: PRIMARY CARE | Facility: CLINIC | Age: 80
End: 2025-07-18
Payer: COMMERCIAL

## 2025-07-18 VITALS
SYSTOLIC BLOOD PRESSURE: 138 MMHG | OXYGEN SATURATION: 96 % | HEART RATE: 105 BPM | DIASTOLIC BLOOD PRESSURE: 82 MMHG | BODY MASS INDEX: 36.28 KG/M2 | HEIGHT: 62 IN

## 2025-07-18 DIAGNOSIS — E66.01 CLASS 2 SEVERE OBESITY WITH SERIOUS COMORBIDITY AND BODY MASS INDEX (BMI) OF 36.0 TO 36.9 IN ADULT, UNSPECIFIED OBESITY TYPE: ICD-10-CM

## 2025-07-18 DIAGNOSIS — Z13.820 ENCOUNTER FOR SCREENING FOR OSTEOPOROSIS: ICD-10-CM

## 2025-07-18 DIAGNOSIS — E78.2 MIXED HYPERLIPIDEMIA: ICD-10-CM

## 2025-07-18 DIAGNOSIS — E11.69 TYPE 2 DIABETES MELLITUS WITH OTHER SPECIFIED COMPLICATION, WITHOUT LONG-TERM CURRENT USE OF INSULIN: ICD-10-CM

## 2025-07-18 DIAGNOSIS — Z00.00 ANNUAL PHYSICAL EXAM: Primary | ICD-10-CM

## 2025-07-18 DIAGNOSIS — Z79.899 POLYPHARMACY: ICD-10-CM

## 2025-07-18 DIAGNOSIS — L71.9 ROSACEA: ICD-10-CM

## 2025-07-18 DIAGNOSIS — Z78.0 MENOPAUSE: ICD-10-CM

## 2025-07-18 DIAGNOSIS — F41.9 ANXIETY: ICD-10-CM

## 2025-07-18 DIAGNOSIS — Z01.89 ENCOUNTER FOR ROUTINE LABORATORY TESTING: ICD-10-CM

## 2025-07-18 DIAGNOSIS — I85.00 ESOPHAGEAL VARICES WITHOUT BLEEDING, UNSPECIFIED ESOPHAGEAL VARICES TYPE (MULTI): ICD-10-CM

## 2025-07-18 DIAGNOSIS — F32.A DEPRESSION, UNSPECIFIED DEPRESSION TYPE: ICD-10-CM

## 2025-07-18 DIAGNOSIS — K21.9 GASTROESOPHAGEAL REFLUX DISEASE WITHOUT ESOPHAGITIS: ICD-10-CM

## 2025-07-18 DIAGNOSIS — M15.0 PRIMARY OSTEOARTHRITIS INVOLVING MULTIPLE JOINTS: ICD-10-CM

## 2025-07-18 DIAGNOSIS — R73.03 PREDIABETES: ICD-10-CM

## 2025-07-18 DIAGNOSIS — K75.81 NASH (NONALCOHOLIC STEATOHEPATITIS): ICD-10-CM

## 2025-07-18 DIAGNOSIS — Z71.89 COUNSELING REGARDING ADVANCED CARE PLANNING AND GOALS OF CARE: ICD-10-CM

## 2025-07-18 DIAGNOSIS — I10 PRIMARY HYPERTENSION: ICD-10-CM

## 2025-07-18 DIAGNOSIS — D64.9 ANEMIA, UNSPECIFIED TYPE: ICD-10-CM

## 2025-07-18 DIAGNOSIS — E66.812 CLASS 2 SEVERE OBESITY WITH SERIOUS COMORBIDITY AND BODY MASS INDEX (BMI) OF 36.0 TO 36.9 IN ADULT, UNSPECIFIED OBESITY TYPE: ICD-10-CM

## 2025-07-18 DIAGNOSIS — Z23 ENCOUNTER FOR IMMUNIZATION: ICD-10-CM

## 2025-07-18 DIAGNOSIS — E55.9 VITAMIN D DEFICIENCY: ICD-10-CM

## 2025-07-18 PROBLEM — E11.9 T2DM (TYPE 2 DIABETES MELLITUS) (MULTI): Status: ACTIVE | Noted: 2023-08-21

## 2025-07-18 PROCEDURE — 3075F SYST BP GE 130 - 139MM HG: CPT | Performed by: STUDENT IN AN ORGANIZED HEALTH CARE EDUCATION/TRAINING PROGRAM

## 2025-07-18 PROCEDURE — 99397 PER PM REEVAL EST PAT 65+ YR: CPT | Performed by: STUDENT IN AN ORGANIZED HEALTH CARE EDUCATION/TRAINING PROGRAM

## 2025-07-18 PROCEDURE — 1158F ADVNC CARE PLAN TLK DOCD: CPT | Performed by: STUDENT IN AN ORGANIZED HEALTH CARE EDUCATION/TRAINING PROGRAM

## 2025-07-18 PROCEDURE — 1123F ACP DISCUSS/DSCN MKR DOCD: CPT | Performed by: STUDENT IN AN ORGANIZED HEALTH CARE EDUCATION/TRAINING PROGRAM

## 2025-07-18 PROCEDURE — 1170F FXNL STATUS ASSESSED: CPT | Performed by: STUDENT IN AN ORGANIZED HEALTH CARE EDUCATION/TRAINING PROGRAM

## 2025-07-18 PROCEDURE — 3079F DIAST BP 80-89 MM HG: CPT | Performed by: STUDENT IN AN ORGANIZED HEALTH CARE EDUCATION/TRAINING PROGRAM

## 2025-07-18 PROCEDURE — 1159F MED LIST DOCD IN RCRD: CPT | Performed by: STUDENT IN AN ORGANIZED HEALTH CARE EDUCATION/TRAINING PROGRAM

## 2025-07-18 PROCEDURE — 1126F AMNT PAIN NOTED NONE PRSNT: CPT | Performed by: STUDENT IN AN ORGANIZED HEALTH CARE EDUCATION/TRAINING PROGRAM

## 2025-07-18 PROCEDURE — 1160F RVW MEDS BY RX/DR IN RCRD: CPT | Performed by: STUDENT IN AN ORGANIZED HEALTH CARE EDUCATION/TRAINING PROGRAM

## 2025-07-18 RX ORDER — MELOXICAM 15 MG/1
15 TABLET ORAL DAILY PRN
Status: SHIPPED
Start: 2025-07-18

## 2025-07-18 RX ORDER — METRONIDAZOLE 7.5 MG/G
CREAM TOPICAL 2 TIMES DAILY PRN
Status: SHIPPED
Start: 2025-07-18

## 2025-07-18 ASSESSMENT — ENCOUNTER SYMPTOMS
PSYCHIATRIC NEGATIVE: 1
CONSTITUTIONAL NEGATIVE: 1
MUSCULOSKELETAL NEGATIVE: 1
HEMATOLOGIC/LYMPHATIC NEGATIVE: 1
RESPIRATORY NEGATIVE: 1
ENDOCRINE NEGATIVE: 1
ALLERGIC/IMMUNOLOGIC NEGATIVE: 1
GASTROINTESTINAL NEGATIVE: 1
CARDIOVASCULAR NEGATIVE: 1
EYES NEGATIVE: 1
NEUROLOGICAL NEGATIVE: 1

## 2025-07-18 ASSESSMENT — ACTIVITIES OF DAILY LIVING (ADL)
DOING_HOUSEWORK: INDEPENDENT
TAKING_MEDICATION: INDEPENDENT
DRESSING: INDEPENDENT
GROCERY_SHOPPING: INDEPENDENT
BATHING: INDEPENDENT
MANAGING_FINANCES: INDEPENDENT

## 2025-07-18 ASSESSMENT — PATIENT HEALTH QUESTIONNAIRE - PHQ9
1. LITTLE INTEREST OR PLEASURE IN DOING THINGS: NOT AT ALL
2. FEELING DOWN, DEPRESSED OR HOPELESS: NOT AT ALL
SUM OF ALL RESPONSES TO PHQ9 QUESTIONS 1 AND 2: 0

## 2025-07-18 ASSESSMENT — PAIN SCALES - GENERAL: PAINLEVEL_OUTOF10: 0-NO PAIN

## 2025-07-18 NOTE — PATIENT INSTRUCTIONS
- Overall, the patient feels well and denies any acute symptoms or concerns at this time.  - Blood pressure at goal today.  - Encouraged continued dietary, exercise, and lifestyle modification.  - Significant medication and problem list reconciliation done today.   - Today's appointment is to keep the patient in compliance with their controlled substance agreement (CSA).   - The patient needs to sign a new CSA since the previous one has . Discussed with the patient that they would be provided a paper copy and that they could also find this on their MyChart. Patient voiced understanding and agreement.  - The patient has completed their urine drug screen (UDS) this year.  - I have considered the risks of abuse, dependence, addiction, and/or diversion and have discussed these with the patient during our appointment.  - I do believe that the medication(s) are medically necessary. Patient has tried multiple alternatives to controlled substances in the past with limited success.  - The patient's symptoms are well-controlled on current therapy.  - PDMP reviewed and appropriate.    Discussed routine and/or preventative care with the patient as outlined below:  - Labwork:   - Patient had labwork done for this appointment. Discussed today. Everything was stable. Do not need to make changes at this time.  - Will order labwork for the patient's next appointment. Encouraged the patient to get this labwork done one week prior to the next appointment.  - Screening Studies:   - Encouraged the patient to continue to get their routine annual diabetic retinal exam.  - Bone Density Study: Patient has never had this done. Ordered today.  - Patient does not appear to be due for routine imaging at this time.  - Immunizations:   - Discussed the tetanus (TDAP) booster.  - Discussed seasonal immunizations, including the influenza and COVID-19 immunizations.     ADVANCED CARE PLANNING  Advanced Care Planning was discussed with  patient.  Encouraged the patient to confirm that Living Will and Healthcare Power of  (HCPoA) are accurate and up to date.  Encouraged the patient to confirm that our office be provided a copy of any documentation in the event that anything changes.

## 2025-07-18 NOTE — PROGRESS NOTES
[facility]: [facility]  MEDICARE WELLNESS EXAM    Jaclyn Carson is a 79 y.o. female that is presenting today for Annual Exam.    Assessment/Plan    - Overall, the patient feels well and denies any acute symptoms or concerns at this time.  - Blood pressure at goal today.  - Encouraged continued dietary, exercise, and lifestyle modification.  - Significant medication and problem list reconciliation done today.   - Today's appointment is to keep the patient in compliance with their controlled substance agreement (CSA).   - The patient needs to sign a new CSA since the previous one has . Discussed with the patient that they would be provided a paper copy and that they could also find this on their MyChart. Patient voiced understanding and agreement.  - The patient has completed their urine drug screen (UDS) this year.  - I have considered the risks of abuse, dependence, addiction, and/or diversion and have discussed these with the patient during our appointment.  - I do believe that the medication(s) are medically necessary. Patient has tried multiple alternatives to controlled substances in the past with limited success.  - The patient's symptoms are well-controlled on current therapy.  - PDMP reviewed and appropriate.    Discussed routine and/or preventative care with the patient as outlined below:  - Labwork:   - Patient had labwork done for this appointment. Discussed today. Everything was stable. Do not need to make changes at this time.  - Will order labwork for the patient's next appointment. Encouraged the patient to get this labwork done one week prior to the next appointment.  - Screening Studies:   - Encouraged the patient to continue to get their routine annual diabetic retinal exam.  - Bone Density Study: Patient has never had this done. Ordered today.  - Patient does not appear to be due for routine imaging at this time.  - Immunizations:   - Discussed the tetanus  (TDAP) booster.  - Discussed seasonal immunizations, including the influenza and COVID-19 immunizations.     ADVANCED CARE PLANNING  Advanced Care Planning was discussed with patient.  Encouraged the patient to confirm that Living Will and Healthcare Power of  (HCPoA) are accurate and up to date.  Encouraged the patient to confirm that our office be provided a copy of any documentation in the event that anything changes.    Diagnoses and all orders for this visit:  Annual physical exam  -     Follow Up In Primary Care  Counseling regarding advanced care planning and goals of care  Encounter for screening for osteoporosis  -     XR DEXA bone density; Future  Encounter for routine laboratory testing  Encounter for immunization  Polypharmacy  -     Opiate/Opioid/Benzo Prescription Compliance; Future  -     Follow Up In Primary Care; Future  Class 2 severe obesity with serious comorbidity and body mass index (BMI) of 36.0 to 36.9 in adult, unspecified obesity type  Type 2 diabetes mellitus with other specified complication, without long-term current use of insulin  -     CBC and Auto Differential; Future  -     Comprehensive Metabolic Panel; Future  -     Lipid Panel; Future  -     Hemoglobin A1C; Future  -     Vitamin D 25-Hydroxy,Total (for eval of Vitamin D levels); Future  -     TSH with reflex to Free T4 if abnormal; Future  -     Follow Up In Primary Care; Future  -     Albumin-Creatinine Ratio, Urine Random; Future  Esophageal varices without bleeding, unspecified esophageal varices type (Multi)  -     CBC and Auto Differential; Future  -     Comprehensive Metabolic Panel; Future  -     Lipid Panel; Future  -     Hemoglobin A1C; Future  -     Vitamin D 25-Hydroxy,Total (for eval of Vitamin D levels); Future  -     TSH with reflex to Free T4 if abnormal; Future  -     Follow Up In Primary Care; Future  -     Albumin-Creatinine Ratio, Urine Random; Future  Gastroesophageal reflux disease without  esophagitis  -     CBC and Auto Differential; Future  -     Comprehensive Metabolic Panel; Future  -     Lipid Panel; Future  -     Hemoglobin A1C; Future  -     Vitamin D 25-Hydroxy,Total (for eval of Vitamin D levels); Future  -     TSH with reflex to Free T4 if abnormal; Future  -     Follow Up In Primary Care; Future  -     Albumin-Creatinine Ratio, Urine Random; Future  Primary osteoarthritis involving multiple joints  -     meloxicam (Mobic) 15 mg tablet; Take 1 tablet (15 mg) by mouth once daily as needed (pain).  -     CBC and Auto Differential; Future  -     Comprehensive Metabolic Panel; Future  -     Lipid Panel; Future  -     Hemoglobin A1C; Future  -     Vitamin D 25-Hydroxy,Total (for eval of Vitamin D levels); Future  -     TSH with reflex to Free T4 if abnormal; Future  -     Follow Up In Primary Care; Future  -     Albumin-Creatinine Ratio, Urine Random; Future  Depression, unspecified depression type  -     CBC and Auto Differential; Future  -     Comprehensive Metabolic Panel; Future  -     Lipid Panel; Future  -     Hemoglobin A1C; Future  -     Vitamin D 25-Hydroxy,Total (for eval of Vitamin D levels); Future  -     TSH with reflex to Free T4 if abnormal; Future  -     Follow Up In Primary Care; Future  -     Albumin-Creatinine Ratio, Urine Random; Future  SANON (nonalcoholic steatohepatitis)  -     CBC and Auto Differential; Future  -     Comprehensive Metabolic Panel; Future  -     Lipid Panel; Future  -     Hemoglobin A1C; Future  -     Vitamin D 25-Hydroxy,Total (for eval of Vitamin D levels); Future  -     TSH with reflex to Free T4 if abnormal; Future  -     Follow Up In Primary Care; Future  -     Albumin-Creatinine Ratio, Urine Random; Future  Anemia, unspecified type  -     CBC and Auto Differential; Future  -     Comprehensive Metabolic Panel; Future  -     Lipid Panel; Future  -     Hemoglobin A1C; Future  -     Vitamin D 25-Hydroxy,Total (for eval of Vitamin D levels); Future  -      TSH with reflex to Free T4 if abnormal; Future  -     Follow Up In Primary Care; Future  -     Albumin-Creatinine Ratio, Urine Random; Future  Mixed hyperlipidemia  -     CBC and Auto Differential; Future  -     Comprehensive Metabolic Panel; Future  -     Lipid Panel; Future  -     Hemoglobin A1C; Future  -     Vitamin D 25-Hydroxy,Total (for eval of Vitamin D levels); Future  -     TSH with reflex to Free T4 if abnormal; Future  -     Follow Up In Primary Care; Future  -     Albumin-Creatinine Ratio, Urine Random; Future  Primary hypertension  -     CBC and Auto Differential; Future  -     Comprehensive Metabolic Panel; Future  -     Lipid Panel; Future  -     Hemoglobin A1C; Future  -     Vitamin D 25-Hydroxy,Total (for eval of Vitamin D levels); Future  -     TSH with reflex to Free T4 if abnormal; Future  -     Follow Up In Primary Care; Future  -     Albumin-Creatinine Ratio, Urine Random; Future  Vitamin D deficiency  -     CBC and Auto Differential; Future  -     Comprehensive Metabolic Panel; Future  -     Lipid Panel; Future  -     Hemoglobin A1C; Future  -     Vitamin D 25-Hydroxy,Total (for eval of Vitamin D levels); Future  -     TSH with reflex to Free T4 if abnormal; Future  -     Follow Up In Primary Care; Future  -     Albumin-Creatinine Ratio, Urine Random; Future  Prediabetes  -     CBC and Auto Differential; Future  -     Comprehensive Metabolic Panel; Future  -     Lipid Panel; Future  -     Hemoglobin A1C; Future  -     Vitamin D 25-Hydroxy,Total (for eval of Vitamin D levels); Future  -     TSH with reflex to Free T4 if abnormal; Future  -     Follow Up In Primary Care; Future  -     Albumin-Creatinine Ratio, Urine Random; Future  Menopause  -     XR DEXA bone density; Future  Rosacea  -     metroNIDAZOLE (Metrocream) 0.75 % cream; Apply topically 2 times a day as needed (rash).  -     CBC and Auto Differential; Future  -     Comprehensive Metabolic Panel; Future  -     Lipid Panel; Future  -      Hemoglobin A1C; Future  -     Vitamin D 25-Hydroxy,Total (for eval of Vitamin D levels); Future  -     TSH with reflex to Free T4 if abnormal; Future  -     Follow Up In Primary Care; Future  -     Albumin-Creatinine Ratio, Urine Random; Future  Anxiety  -     CBC and Auto Differential; Future  -     Comprehensive Metabolic Panel; Future  -     Lipid Panel; Future  -     Hemoglobin A1C; Future  -     Vitamin D 25-Hydroxy,Total (for eval of Vitamin D levels); Future  -     TSH with reflex to Free T4 if abnormal; Future  -     Follow Up In Primary Care; Future  -     Albumin-Creatinine Ratio, Urine Random; Future    Current Outpatient Medications   Medication Instructions    acetaminophen (TYLENOL EXTRA STRENGTH) 1,000 mg, oral, Every 6 hours PRN    albuterol (Proventil HFA) 90 mcg/actuation inhaler 2 puffs, 4 times daily PRN    albuterol 0.63 mg/3 mL nebulizer solution Every 6 hours PRN    calcium carb,gluc/mag ox,gluc (CALCIUM MAGNESIUM ORAL) 1 tablet, Daily    docusate sodium (COLACE) 100 mg, oral, 2 times daily, As needed for constipation    ergocalciferol (VITAMIN D-2) 50,000 Units, oral, Weekly    gabapentin (NEURONTIN) 100 mg, oral, 3 times daily    hydroCHLOROthiazide (HYDRODIURIL) 25 mg, oral, Daily    ipratropium-albuteroL (Duo-Neb) 0.5-2.5 mg/3 mL nebulizer solution 3 mL, nebulization, Every 6 hours PRN    lidocaine (Xylocaine) 2 % solution As needed    meloxicam (MOBIC) 15 mg, oral, Daily PRN    metroNIDAZOLE (Metrocream) 0.75 % cream Topical, 2 times daily PRN    ondansetron (ZOFRAN) 4 mg, oral, Every 8 hours PRN, As needed for nausea    potassium chloride CR 20 mEq ER tablet 20 mEq, oral, Daily, Take with food.    predniSONE (Deltasone) 10 mg tablet Takes prn    tiZANidine (ZANAFLEX) 4 mg, oral, Every 8 hours PRN    triamcinolone (Kenalog) 0.1 % cream Apply topically if needed.    zolpidem (AMBIEN) 10 mg, oral, Nightly PRN     Subjective   - The patient otherwise feels well and denies any acute  symptoms or concerns at this time.  - The patient denies any changes or progression of their chronic medical problems.  - The patient denies any problems or concerns with their medications.      Review of Systems   Constitutional: Negative.    HENT: Negative.     Eyes: Negative.    Respiratory: Negative.     Cardiovascular: Negative.    Gastrointestinal: Negative.    Endocrine: Negative.    Genitourinary: Negative.    Musculoskeletal: Negative.    Skin: Negative.    Allergic/Immunologic: Negative.    Neurological: Negative.    Hematological: Negative.    Psychiatric/Behavioral: Negative.     All other systems reviewed and are negative.    Objective   Vitals:    07/18/25 1104   BP: 138/82   Pulse: 105   SpO2: 96%      Body mass index is 36.28 kg/m².  Physical Exam  Vitals and nursing note reviewed.   Constitutional:       General: She is not in acute distress.     Appearance: Normal appearance. She is not ill-appearing.   HENT:      Head: Normocephalic and atraumatic.      Right Ear: Tympanic membrane, ear canal and external ear normal. There is no impacted cerumen.      Left Ear: Tympanic membrane, ear canal and external ear normal. There is no impacted cerumen.      Nose: Nose normal.      Mouth/Throat:      Mouth: Mucous membranes are moist.      Pharynx: Oropharynx is clear. No oropharyngeal exudate or posterior oropharyngeal erythema.     Eyes:      General: No scleral icterus.        Right eye: No discharge.         Left eye: No discharge.      Extraocular Movements: Extraocular movements intact.      Conjunctiva/sclera: Conjunctivae normal.      Pupils: Pupils are equal, round, and reactive to light.     Neck:      Vascular: No carotid bruit.     Cardiovascular:      Rate and Rhythm: Normal rate and regular rhythm.      Pulses: Normal pulses.      Heart sounds: Normal heart sounds. No murmur heard.     No friction rub. No gallop.   Pulmonary:      Effort: Pulmonary effort is normal. No respiratory distress.     "  Breath sounds: Normal breath sounds.   Abdominal:      General: Abdomen is flat. Bowel sounds are normal. There is no distension.      Palpations: Abdomen is soft.      Tenderness: There is no abdominal tenderness.      Hernia: No hernia is present.     Musculoskeletal:         General: No swelling or tenderness. Normal range of motion.   Lymphadenopathy:      Cervical: No cervical adenopathy.     Skin:     General: Skin is warm and dry.      Capillary Refill: Capillary refill takes less than 2 seconds.      Coloration: Skin is not jaundiced.      Findings: No rash.     Neurological:      General: No focal deficit present.      Mental Status: She is alert and oriented to person, place, and time. Mental status is at baseline.     Psychiatric:         Mood and Affect: Mood normal.         Behavior: Behavior normal.       Diagnostic Results   Lab Results   Component Value Date    GLUCOSE 146 (H) 04/26/2025    CALCIUM 8.7 04/26/2025     04/26/2025    K 3.8 04/26/2025    CO2 31 04/26/2025     04/26/2025    BUN 18 04/26/2025    CREATININE 0.67 04/26/2025     Lab Results   Component Value Date    ALT 16 04/26/2025    AST 15 04/26/2025    ALKPHOS 74 04/26/2025    BILITOT 0.6 04/26/2025     Lab Results   Component Value Date    WBC 11.5 (H) 04/26/2025    HGB 16.3 (H) 04/26/2025    HCT 48.8 (H) 04/26/2025    MCV 89.9 04/26/2025     04/26/2025     Lab Results   Component Value Date    CHOL 167 04/26/2025    CHOL 168 04/05/2024    CHOL 187 10/03/2023     Lab Results   Component Value Date    HDL 39 (L) 04/26/2025    HDL 36.1 04/05/2024    HDL 41.9 10/03/2023     Lab Results   Component Value Date    LDLCALC 101 (H) 04/26/2025    LDLCALC 95 04/05/2024    LDLCALC 105 (L) 10/03/2023     Lab Results   Component Value Date    TRIG 173 (H) 04/26/2025    TRIG 187 (H) 04/05/2024    TRIG 201 (H) 10/03/2023     No components found for: \"CHOLHDL\"  Lab Results   Component Value Date    HGBA1C 6.3 (H) 04/26/2025 "     Other labs not included in the list above reviewed either before or during this encounter.    History   Medical History[1]  Surgical History[2]  Family History[3]  Social History     Socioeconomic History    Marital status:      Spouse name: Not on file    Number of children: Not on file    Years of education: Not on file    Highest education level: Not on file   Occupational History    Not on file   Tobacco Use    Smoking status: Former     Current packs/day: 0.00     Average packs/day: 0.3 packs/day for 47.0 years (11.8 ttl pk-yrs)     Types: Cigarettes     Start date: 1963     Quit date: 2010     Years since quitting: 15.5    Smokeless tobacco: Never   Vaping Use    Vaping status: Never Used   Substance and Sexual Activity    Alcohol use: Not Currently     Comment: rarely    Drug use: Never    Sexual activity: Not on file   Other Topics Concern    Not on file   Social History Narrative    Not on file     Social Drivers of Health     Financial Resource Strain: Not on file   Food Insecurity: Not on file   Transportation Needs: Not on file   Physical Activity: Not on file   Stress: Not on file   Social Connections: Not on file   Intimate Partner Violence: Not on file   Housing Stability: Not on file     Allergies[4]  Medications Ordered Prior to Encounter[5]  Immunization History   Administered Date(s) Administered    COVID-19, mRNA, LNP-S, PF, 30 mcg/0.3 mL dose 10/21/2021    Flu vaccine, trivalent, preservative free, HIGH-DOSE, age 65y+ (Fluzone) 10/29/2016, 09/08/2018, 08/23/2019    Influenza, Seasonal, Quadrivalent, Adjuvanted 10/16/2020, 10/21/2021, 10/15/2022, 10/06/2023    Influenza, injectable, quadrivalent 09/08/2018    Influenza, seasonal, injectable 11/30/2010, 10/01/2015    Influenza, trivalent, adjuvanted 10/26/2024    Pfizer COVID-19 vaccine, 12 years and older, (30mcg/0.3mL) (Comirnaty) 10/06/2023, 10/26/2024    Pfizer COVID-19 vaccine, bivalent, age 12 years and older (30 mcg/0.3 mL)  10/15/2022    Pfizer Gray Cap SARS-CoV-2 04/30/2022    Pfizer Purple Cap SARS-CoV-2 02/27/2021, 03/27/2021    Pneumococcal conjugate vaccine, 13-valent (PREVNAR 13) 10/27/2015, 11/01/2017, 12/01/2017    Pneumococcal polysaccharide vaccine, 23-valent, age 2 years and older (PNEUMOVAX 23) 09/10/2018    RSV, 60 Years And Older (AREXVY) 12/02/2023    Zoster vaccine, recombinant, adult (SHINGRIX) 03/26/2019, 08/24/2019    Zoster, live 10/06/2017     Patient's medical history was reviewed and updated either before or during this encounter.     Gilberto Corrales MD         [1]   Past Medical History:  Diagnosis Date    Asthma     Chronic bronchitis (Multi)     Fatty liver     GERD (gastroesophageal reflux disease)     Hx of leukocytosis     Hypertension     Other abnormal and inconclusive findings on diagnostic imaging of breast 12/09/2020    Abnormal mammogram of left breast    Other specified disorders of breast 12/09/2020    Seroma of breast    Personal history of other diseases of the circulatory system 12/09/2020    History of hypertension    Personal history of other diseases of the respiratory system 11/26/2019    History of acute bacterial sinusitis    Prediabetes     Squamous cell carcinoma of skin of right lower limb, including hip 12/09/2020    Squamous cell carcinoma of skin of right lower extremity   [2]   Past Surgical History:  Procedure Laterality Date    ANKLE SURGERY      HERNIA REPAIR  11/30/2020    Hernia Repair    OTHER SURGICAL HISTORY  10/19/2020    Biopsy Skin    ROTATOR CUFF REPAIR Bilateral 11/30/2020    Rotator Cuff Repair    TOTAL ABDOMINAL HYSTERECTOMY  12/09/2020    Total Abdominal Hysterectomy    UMBILICAL HERNIA REPAIR  12/09/2020    Umbilical Hernia Repair   [3]   Family History  Problem Relation Name Age of Onset    Stroke Mother      Diabetes Mother      Alzheimer's disease Father      Lung cancer Sister      No Known Problems Sister      Asthma Brother      Cancer Brother      No Known  Problems Brother      Breast cancer Daughter 42 40    Cancer Daughter 42     No Known Problems Daughter     [4]   Allergies  Allergen Reactions    Macrobid [Nitrofurantoin Monohyd/M-Cryst] Nausea/vomiting     Pt first discovered reaction to this medication on 9/10/24     Amoxicillin-Pot Clavulanate Itching     vomiting    Hydrocodone-Acetaminophen Rash and Itching   [5]   Current Outpatient Medications on File Prior to Visit   Medication Sig Dispense Refill    acetaminophen (Tylenol Extra Strength) 500 mg tablet Take 2 tablets (1,000 mg) by mouth every 6 hours if needed for mild pain (1 - 3) for up to 11 days. 30 tablet 2    albuterol (Proventil HFA) 90 mcg/actuation inhaler Inhale 2 puffs 4 times a day as needed.      albuterol 0.63 mg/3 mL nebulizer solution Take by nebulization every 6 hours if needed.      calcium carb,gluc/mag ox,gluc (CALCIUM MAGNESIUM ORAL) 1 tablet once daily.      docusate sodium (Colace) 100 mg capsule Take 1 capsule (100 mg) by mouth 2 times a day. As needed for constipation 30 capsule 1    ergocalciferol (Vitamin D-2) 1.25 MG (84881 UT) capsule Take 1 capsule (50,000 Units) by mouth 1 (one) time per week. 12 capsule 3    gabapentin (Neurontin) 100 mg capsule Take 1 capsule (100 mg) by mouth 3 times a day. 270 capsule 3    hydroCHLOROthiazide (HYDRODiuril) 25 mg tablet TAKE 1 TABLET BY MOUTH EVERY DAY 90 tablet 1    ipratropium-albuteroL (Duo-Neb) 0.5-2.5 mg/3 mL nebulizer solution Take 3 mL by nebulization every 6 hours if needed for wheezing or shortness of breath. 180 mL     lidocaine (Xylocaine) 2 % solution Take by mouth if needed. Dental work      ondansetron (Zofran) 4 mg tablet Take 1 tablet (4 mg) by mouth every 8 hours if needed for nausea or vomiting. As needed for nausea 30 tablet 2    potassium chloride CR 20 mEq ER tablet TAKE 1 TABLET BY MOUTH EVERY DAY WITH FOOD 90 tablet 3    predniSONE (Deltasone) 10 mg tablet Takes prn      tiZANidine (Zanaflex) 4 mg tablet Take 1  tablet (4 mg) by mouth every 8 hours if needed for muscle spasms. 60 tablet 1    triamcinolone (Kenalog) 0.1 % cream Apply topically if needed.      zolpidem (Ambien) 10 mg tablet Take 1 tablet (10 mg) by mouth as needed at bedtime for sleep. 90 tablet 1    [DISCONTINUED] meloxicam (Mobic) 15 mg tablet Take 1 tablet (15 mg) by mouth once daily. (Patient taking differently: Take 1 tablet (15 mg) by mouth once daily as needed.)      [DISCONTINUED] metroNIDAZOLE (Metrocream) 0.75 % cream Apply to face twice daily (Patient taking differently: Apply topically 2 times a day as needed. Apply to face twice daily) 45 g 11    [DISCONTINUED] acetaminophen (TylenoL) 325 mg tablet Take 2 tablets (650 mg) by mouth every 6 hours if needed for mild pain (1 - 3) or fever (temp greater than 38.0 C). For up to 14 days (Patient not taking: Reported on 7/18/2025) 90 tablet 0    [DISCONTINUED] aspirin 81 mg EC tablet Take 1 tablet (81 mg) by mouth once daily for 14 days. (Patient not taking: Reported on 7/18/2025) 14 tablet 0    [DISCONTINUED] doxycycline (Adoxa) 100 mg tablet Take 1 tablet (100 mg) by mouth if needed. (Patient not taking: Reported on 7/18/2025)       No current facility-administered medications on file prior to visit.

## 2025-07-25 ENCOUNTER — HOSPITAL ENCOUNTER (OUTPATIENT)
Dept: RADIOLOGY | Facility: CLINIC | Age: 80
Discharge: HOME | End: 2025-07-25
Payer: COMMERCIAL

## 2025-07-25 DIAGNOSIS — Z78.0 MENOPAUSE: ICD-10-CM

## 2025-07-25 DIAGNOSIS — Z13.820 ENCOUNTER FOR SCREENING FOR OSTEOPOROSIS: ICD-10-CM

## 2025-07-25 PROCEDURE — 77080 DXA BONE DENSITY AXIAL: CPT

## 2025-08-13 ENCOUNTER — OFFICE VISIT (OUTPATIENT)
Dept: ORTHOPEDIC SURGERY | Facility: CLINIC | Age: 80
End: 2025-08-13
Payer: COMMERCIAL

## 2025-08-13 DIAGNOSIS — M75.101 TEAR OF RIGHT ROTATOR CUFF, UNSPECIFIED TEAR EXTENT, UNSPECIFIED WHETHER TRAUMATIC: Primary | ICD-10-CM

## 2025-08-13 PROCEDURE — 1159F MED LIST DOCD IN RCRD: CPT | Performed by: STUDENT IN AN ORGANIZED HEALTH CARE EDUCATION/TRAINING PROGRAM

## 2025-08-13 PROCEDURE — 99212 OFFICE O/P EST SF 10 MIN: CPT | Performed by: STUDENT IN AN ORGANIZED HEALTH CARE EDUCATION/TRAINING PROGRAM

## 2025-08-13 PROCEDURE — 99024 POSTOP FOLLOW-UP VISIT: CPT | Performed by: STUDENT IN AN ORGANIZED HEALTH CARE EDUCATION/TRAINING PROGRAM

## 2025-08-13 ASSESSMENT — PAIN - FUNCTIONAL ASSESSMENT: PAIN_FUNCTIONAL_ASSESSMENT: NO/DENIES PAIN

## 2025-10-29 ENCOUNTER — APPOINTMENT (OUTPATIENT)
Dept: DERMATOLOGY | Facility: CLINIC | Age: 80
End: 2025-10-29
Payer: COMMERCIAL

## 2026-04-15 ENCOUNTER — APPOINTMENT (OUTPATIENT)
Dept: OBSTETRICS AND GYNECOLOGY | Facility: CLINIC | Age: 81
End: 2026-04-15
Payer: COMMERCIAL

## (undated) DEVICE — GLOVE, SURGICAL, PROTEXIS PI , 7.0, PF, LF

## (undated) DEVICE — Device

## (undated) DEVICE — BLOCK, FOAM, NEEDLE POP -N-COUNT, 1840, BLACK

## (undated) DEVICE — BANDAGE, GAUZE, CONFORMING, KERLIX, 6 PLY, 4.5 IN X 4.1 YD

## (undated) DEVICE — ADHESIVE, SKIN, LIQUIBAND EXCEED

## (undated) DEVICE — TUBING, PUMP REDEUCE 8FT STERILE

## (undated) DEVICE — BLADE, OSCILLATING/SAGITTAL, 25MM X 9MM

## (undated) DEVICE — SUTURE, MONOCRYL, 2-0, 36 IN, CT-1, UNDYED

## (undated) DEVICE — GOWN, ASTOUND, XL

## (undated) DEVICE — PADDING, WEBRIL, UNDERCAST, STERILE, 4 IN

## (undated) DEVICE — DRESSING, MEPILEX BORDER, POST-OP AG, 4 X 10 IN

## (undated) DEVICE — DRAPE, SHEET, THREE QUARTER, FAN FOLD, 57 X 77 IN

## (undated) DEVICE — DRAPE, SHEET, 17 X 23 IN

## (undated) DEVICE — SUTURE, MONOCRYL, 4-0, 27 IN, PS-2, UNDYED

## (undated) DEVICE — PENCIL, ELECTROSURG, W/BUTTON SWITCH & HOLSTER, EZ CLEAN, DISP

## (undated) DEVICE — SUTURE, ETHILON, 3-0, 18 IN, PS1, BLACK

## (undated) DEVICE — DRESSING, GAUZE, PETROLATUM, STRIP, XEROFORM, 1 X 8 IN, STERILE

## (undated) DEVICE — COVER, CART, 45 X 27 X 48 IN, CLEAR

## (undated) DEVICE — IRRIGATION SET, Y, LARGE BORE

## (undated) DEVICE — SUTURE, FIBERWIRE 2, T-5 TAPER NEEDLE, 38"

## (undated) DEVICE — DRAPE, SHEET, U, STERI DRAPE, 47 X 51 IN, DISPOSABLE, STERILE

## (undated) DEVICE — PROBE, APOLLO RF, 90 DEG, EXTRA LARTGE

## (undated) DEVICE — SUTURE, MONOCRYL, 4-0, 18 IN, PS2, UNDYED

## (undated) DEVICE — BANDAGE, ELASTIC, MATRIX, SELF-CLOSURE, 4 IN X 5 YD, LF

## (undated) DEVICE — BLANKET, LOWER BODY, VHA PLUS, ADULT

## (undated) DEVICE — APPLICATOR, CHLORAPREP, W/ORANGE TINT, 26ML

## (undated) DEVICE — TIP, SUCTION, FRAZIER, W/CONTROL VENT, 12 FR

## (undated) DEVICE — MARKING PEN, ULTRAFINE TIP, STATMARK

## (undated) DEVICE — CANNULA, ARTHROSCOPIC CRYSTAL 5.75MM

## (undated) DEVICE — COVER, BACK TABLE, 65 X 90, HVY REINFORCED

## (undated) DEVICE — STAPLER, SKIN PROXIMATE, 35 WIDE

## (undated) DEVICE — ELECTRODE, ELECTROSURGICAL, BLADE, INSULATED, ENT/IMA, STERILE

## (undated) DEVICE — EXCAL 4MM X 13CM SINGLE

## (undated) DEVICE — SUTURE, MONOSOF, 2-0, 30 IN, C15, BLACK

## (undated) DEVICE — CANNULA, TRIPLE-DAM TWIST-IN, 8.25MM X 7CM

## (undated) DEVICE — BANDAGE, COFLEX, 4 X 5 YDS, TAN, STERILE, LF

## (undated) DEVICE — TOWEL, SURGICAL, NEURO, O/R, 16 X 26, BLUE, STERILE

## (undated) DEVICE — GLOVE, SURGICAL, PROTEXIS PI BLUE W/NEUTHERA, 7.5, PF, LF

## (undated) DEVICE — COVER, LIGHT HANDLE, FLEX, SOFT, PK/1

## (undated) DEVICE — COVER, C-ARM W/CLIPS, OEC GE

## (undated) DEVICE — KIT, BEACH CHAIR TRIMANO

## (undated) DEVICE — APPLICATOR, PREP, CHLORAPREP, W/ORANGE TINT, 10.5ML

## (undated) DEVICE — DRESSING, MEPILEX BORDER, POST-OP AG, 4 X 6 IN

## (undated) DEVICE — DRAPE, INCISE, ANTIMICROBIAL, IOBAN 2, LARGE, 17 X 23 IN, DISPOSABLE, STERILE

## (undated) DEVICE — TUBING, PATIENT 8FT STERILE

## (undated) DEVICE — SUTURE, PDS II, 0, 27 IN, CT-2, VIOLET

## (undated) DEVICE — DRESSING, ABDOMINAL, TENDERSORB, 8 X 7-1/2 IN, STERILE

## (undated) DEVICE — NEEDLE, SCORPION, HD

## (undated) DEVICE — SPONGE, GAUZE, AVANT, STERILE, NONWOVEN, 4PLY, 4 X 4, STANDARD

## (undated) DEVICE — BANDAGE, ELASTIC, MATRIX, SELF-CLOSURE, 6 IN X 5 YD, LF

## (undated) DEVICE — DRAPE, SHEET, U, W/ADHESIVE STRIP, IMPERVIOUS, 60 X 70 IN, DISPOSABLE, LF, STERILE